# Patient Record
Sex: FEMALE | Race: WHITE | NOT HISPANIC OR LATINO | Employment: OTHER | URBAN - METROPOLITAN AREA
[De-identification: names, ages, dates, MRNs, and addresses within clinical notes are randomized per-mention and may not be internally consistent; named-entity substitution may affect disease eponyms.]

---

## 2017-05-31 ENCOUNTER — ALLSCRIPTS OFFICE VISIT (OUTPATIENT)
Dept: OTHER | Facility: OTHER | Age: 62
End: 2017-05-31

## 2017-10-31 ENCOUNTER — HOSPITAL ENCOUNTER (OUTPATIENT)
Dept: MAMMOGRAPHY | Facility: MEDICAL CENTER | Age: 62
Discharge: HOME | End: 2017-10-31
Payer: COMMERCIAL

## 2017-10-31 DIAGNOSIS — Z13.820 ENCOUNTER FOR SCREENING FOR OSTEOPOROSIS: ICD-10-CM

## 2017-10-31 DIAGNOSIS — N60.99 BENIGN MAMMARY DYSPLASIA OF BREAST: ICD-10-CM

## 2017-10-31 DIAGNOSIS — Z12.31 VISIT FOR SCREENING MAMMOGRAM: ICD-10-CM

## 2017-10-31 PROCEDURE — 77063 BREAST TOMOSYNTHESIS BI: CPT

## 2017-10-31 PROCEDURE — G0202 SCR MAMMO BI INCL CAD: HCPCS

## 2017-11-06 ENCOUNTER — GENERIC CONVERSION - ENCOUNTER (OUTPATIENT)
Dept: OTHER | Facility: OTHER | Age: 62
End: 2017-11-06

## 2017-11-21 ENCOUNTER — ALLSCRIPTS OFFICE VISIT (OUTPATIENT)
Dept: OTHER | Facility: OTHER | Age: 62
End: 2017-11-21

## 2017-11-22 NOTE — PROGRESS NOTES
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)    Plan  Screening for osteoporosis    · * DXA BONE DENSITY SPINE HIP AND PELVIS; Status:Hold For -Scheduling,Retrospective Authorization; Requested FMW:[id number];    Perform:[facility] Radiology; Order Comments:PATIENT HAD HEEL SCAN IN OFFICE: LF -2 0, RF -2 4; Due:21Nov2018; Last Updated By:Sondra Diaz; 11/21/2017 11:21:40 AM;Ordered;for osteoporosis; Ordered By:Robert Franklin;    Chief Complaint  Patient presents today for a yearly exam      History of Present Illness  HPI: prior Veverly Belts followed by Dr Madelyn Henry, Adult Female [facility]: The patient is being seen for a gynecology evaluation  The last health maintenance visit was 1 year(s) ago  General Health:  Reproductive health: the patient is postmenopausal    Screening:      Review of Systems   Constitutional: No fever, no chills, feels well, no tiredness, no recent weight gain or loss  Breasts: no complaints of breast pain, breast lump or nipple discharge  Gastrointestinal: no complaints of abdominal pain, no constipation, no nausea or diarrhea, no vomiting, no bloody stools  Genitourinary: no complaints of dysuria, no incontinence, no pelvic pain, no dysmenorrhea, no vaginal discharge or abnormal vaginal bleeding  Active Problems  1  Atypical ductal hyperplasia 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) (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 99)   2  Breast mass (611 72) (N63 0)   3  Breast pain (611 71) (N64 4)   4  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)   5  Menopausal symptoms (627 2) (N95 1)   6  Osteoporosis, senile (733 01) (M81 0)   7  Pruritus vulvae (698 1) (L29 2)   8   Visit for screening mammogram (V76 12) (Z12 31)    Past Medical History     · History of Breast Mass Solitary Nontender 1 cm   · History of Encounter for screening for malignant neoplasm of cervix (V76 2) (Z12 4)   · History of Encounter for screening mammogram for malignant neoplasm of breast(V76 12) (Z12 31)   · History of Left breast lump (611 72) (N63 20)   · History of Summary Of Previous Pregnancies  2  (Total No )   · History of Summary Of Previous Pregnancies Para 2  (Deliveries)   · History of Tetanus (037)    Surgical History   · History of Colonoscopy (Fiberoptic) Screening   · History of Total Abdominal Hysterectomy    Family History  Mother    · Family history of Fibromyalgia   · Family history of Pancreatic Cancer Susceptibility (V84 09)  Father    · Family history of Heart disease  Son    · Family history of bipolar disorder (V17 0) (Z81 8)   · Family history of Suicide  Maternal Aunt    · Family history of Breast Cancer (V16 3)  Family History    · Family history of Family Health Status Of Mother -     Social History     · Denied: Alcohol Use (History)   · Death in the family, child   · suicide 13  · Former smoker (Z12 73) (K08 568)   · Marital History - Single   · Denied: Physical Abuse (History) (V15 41)    Current Meds   1  Accu-Chek Opal STRP; Therapy: (Recorded:2015) to Recorded   2  Ammonium Lactate 12 % External Lotion; Therapy: [id number] to ([place] P[place]) Recorded   3  Calcium TABS; Therapy: (Recorded:2015) to Recorded   4  Halobetasol Propionate 0 05 % External Ointment; Therapy: 2015 to (Evaluate:2015) Recorded   5  Lexapro 20 MG Oral Tablet; TAKE 1 TABLET DAILY; Therapy: (Recorded:2015) to Recorded   6  Multi-Day Vitamins TABS; TAKE 1 TABLET DAILY; Therapy: (Recorded:2015) to Recorded   7  Oxybutynin Chloride 5 MG Oral Tablet; Take 1 tablet daily; Therapy: 30SPS8386 to (Evaluate:2017)  Requested for: 83HSW3978; Last Rx:2016 Ordered   8  PredniSONE 10 MG Oral Tablet; Therapy: 2015 to (Evaluate:2015) Recorded   9  Protonix 20 MG Oral Tablet Delayed Release; TAKE 1 TABLET DAILY; Therapy: (Recorded:2015) to Recorded   10  Vitamin D 1000 UNIT CAPS; TAKE AS DIRECTED;   Therapy: (Recorded:99Asr2919) to Recorded   11  Xanax TABS; Therapy: (Recorded:10Adm0878) to Recorded    Allergies  1  Penicillins   2  Ibuprofen CAPS  3  Adhesive Tape    Vitals   Recorded: [id number] 10:58AM   Heart Rate 66   Systolic 439   Diastolic 60   Height 5 ft 5 75 in   Weight 180 lb 8 oz   BMI Calculated 29 36   BSA Calculated 1 91   LMP ANEESH       Physical Exam   Constitutional  General appearance: No acute distress, well appearing and well nourished  Neck  Neck: Normal, supple, trachea midline, no masses  Thyroid: Normal, no thyromegaly  Pulmonary  Respiratory effort: No increased work of breathing or signs of respiratory distress  Auscultation of lungs: Clear to auscultation  Cardiovascular  Auscultation of heart: Normal rate and rhythm, normal S1 and S2, no murmurs  Peripheral vascular exam: Normal pulses Throughout  Genitourinary  External genitalia: Normal and no lesions appreciated  Vagina: Abnormal   Vagina: atrophy, but-- no bleeding  Urethra: Normal    Urethral meatus: Normal    Bladder: Normal, soft, non-tender and no prolapse or masses appreciated  Cervix: Surgically absent  Uterus: Surgically absent  Adnexa/parametria: Normal, non-tender and no fullness or masses appreciated  Chest  Breasts: Normal and no dimpling or skin changes noted  Abdomen  Abdomen: Normal, non-tender, and no organomegaly noted  Liver and spleen: No hepatomegaly or splenomegaly  Examination for hernias: No hernias appreciated  Lymphatic  Palpation of lymph nodes in neck, axillae, groin and/or other locations: No lymphadenopathy or masses noted     Psychiatric  Orientation to person, place, and time: Normal    Mood and affect: Normal        Signatures   Electronically signed by : Michael Meyer DO; Nov 21 2017 11:25AM EST                       (Author)

## 2017-11-29 ENCOUNTER — GENERIC CONVERSION - ENCOUNTER (OUTPATIENT)
Dept: OTHER | Facility: OTHER | Age: 62
End: 2017-11-29

## 2017-11-29 ENCOUNTER — HOSPITAL ENCOUNTER (OUTPATIENT)
Dept: BONE DENSITY | Facility: MEDICAL CENTER | Age: 62
Discharge: HOME | End: 2017-11-29
Payer: COMMERCIAL

## 2017-11-29 DIAGNOSIS — Z13.820 ENCOUNTER FOR SCREENING FOR OSTEOPOROSIS: ICD-10-CM

## 2017-11-29 PROCEDURE — 77080 DXA BONE DENSITY AXIAL: CPT

## 2018-01-13 VITALS
WEIGHT: 180.5 LBS | HEART RATE: 66 BPM | DIASTOLIC BLOOD PRESSURE: 60 MMHG | HEIGHT: 66 IN | BODY MASS INDEX: 29.01 KG/M2 | SYSTOLIC BLOOD PRESSURE: 120 MMHG

## 2018-01-14 VITALS
RESPIRATION RATE: 16 BRPM | HEART RATE: 70 BPM | TEMPERATURE: 99.2 F | HEIGHT: 67 IN | SYSTOLIC BLOOD PRESSURE: 138 MMHG | DIASTOLIC BLOOD PRESSURE: 89 MMHG

## 2018-01-16 NOTE — MISCELLANEOUS
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)   5  Menopausal symptoms (627 2) (N95 1)   6  Osteoporosis, senile (733 01) (M81 0)   7  Pruritus vulvae (698 1) (L29 2)    Current Meds   1  Accu-Chek Opal STRP; Therapy: (Recorded:16Apr2015) to Recorded   2  Ammonium Lactate 12 % External Lotion; Therapy: [id number] to (Jc Schaefer) Recorded   3  Calcium TABS; Therapy: (Recorded:16Apr2015) to Recorded   4  Halobetasol Propionate 0 05 % External Ointment; Therapy: 09Apr2015 to (Evaluate:22May2015) Recorded   5  Lexapro 20 MG Oral Tablet (Escitalopram Oxalate); TAKE 1 TABLET DAILY; Therapy: (Recorded:16Apr2015) to Recorded   6  Multi-Day Vitamins TABS; TAKE 1 TABLET DAILY; Therapy: (Recorded:16Apr2015) to Recorded   7  Oxybutynin Chloride 5 MG Oral Tablet; Take 1 tablet daily; Therapy: 28NDT5823 to (Evaluate:12Jun2017)  Requested for: 15LFA6620; Last   Rx:14Nov2016 Ordered   8  PredniSONE 10 MG Oral Tablet; Therapy: 13Apr2015 to (Evaluate:07May2015) Recorded   9  Protonix 20 MG Oral Tablet Delayed Release (Pantoprazole Sodium); TAKE 1 TABLET   DAILY; Therapy: (Recorded:16Apr2015) to Recorded   10  Vitamin D 1000 UNIT CAPS; TAKE AS DIRECTED; Therapy: (Recorded:16Apr2015) to Recorded   11  Xanax TABS (ALPRAZolam); Therapy: (Recorded:16Apr2015) to Recorded    Allergies    1  Penicillins   2  Ibuprofen CAPS    3   Adhesive Tape    Signatures   Electronically signed by : Jostin Zamora, ; Nov 15 2016  9:12AM EST                       (Author)

## 2018-01-22 VITALS
WEIGHT: 178.25 LBS | TEMPERATURE: 98.8 F | SYSTOLIC BLOOD PRESSURE: 132 MMHG | HEART RATE: 68 BPM | BODY MASS INDEX: 27.98 KG/M2 | DIASTOLIC BLOOD PRESSURE: 80 MMHG | RESPIRATION RATE: 16 BRPM | HEIGHT: 67 IN

## 2018-02-20 DIAGNOSIS — N95.2 ATROPHIC VAGINITIS: Primary | ICD-10-CM

## 2018-02-21 DIAGNOSIS — N95.2 ATROPHIC VAGINITIS: Primary | ICD-10-CM

## 2018-02-21 RX ORDER — ESTRADIOL 0.1 MG/G
CREAM VAGINAL
Qty: 42.5 G | Refills: 3 | Status: SHIPPED | OUTPATIENT
Start: 2018-02-21 | End: 2018-11-27 | Stop reason: HOSPADM

## 2018-02-21 RX ORDER — ESTRADIOL 0.1 MG/G
1 CREAM VAGINAL WEEKLY
Qty: 42.5 G | Refills: 1 | Status: SHIPPED | OUTPATIENT
Start: 2018-02-21 | End: 2018-03-11 | Stop reason: SDUPTHER

## 2018-03-11 DIAGNOSIS — N95.2 ATROPHIC VAGINITIS: ICD-10-CM

## 2018-03-12 RX ORDER — ESTRADIOL 0.1 MG/G
CREAM VAGINAL
Qty: 42.5 G | Refills: 3 | Status: SHIPPED | OUTPATIENT
Start: 2018-03-12 | End: 2018-08-15 | Stop reason: SDUPTHER

## 2018-07-11 ENCOUNTER — DOCTOR'S OFFICE (OUTPATIENT)
Dept: URBAN - METROPOLITAN AREA CLINIC 136 | Facility: CLINIC | Age: 63
Setting detail: OPHTHALMOLOGY
End: 2018-07-11
Payer: COMMERCIAL

## 2018-07-11 DIAGNOSIS — H25.013: ICD-10-CM

## 2018-07-11 DIAGNOSIS — H40.033: ICD-10-CM

## 2018-07-11 DIAGNOSIS — H43.821: ICD-10-CM

## 2018-07-11 PROCEDURE — 99204 OFFICE O/P NEW MOD 45 MIN: CPT | Performed by: OPHTHALMOLOGY

## 2018-07-11 PROCEDURE — 92134 CPTRZ OPH DX IMG PST SGM RTA: CPT | Performed by: OPHTHALMOLOGY

## 2018-07-11 ASSESSMENT — REFRACTION_MANIFEST
OD_VA1: 20/
OU_VA: 20/
OD_VA2: 20/
OS_VA2: 20/
OS_VA1: 20/
OD_VA3: 20/
OU_VA: 20/
OU_VA: 20/
OD_VA2: 20/
OS_VA3: 20/
OS_VA1: 20/
OD_VA1: 20/
OD_VA2: 20/
OD_VA3: 20/
OS_VA2: 20/
OS_VA1: 20/
OS_VA2: 20/
OD_VA1: 20/
OS_VA3: 20/
OD_VA3: 20/
OS_VA3: 20/

## 2018-07-11 ASSESSMENT — VISUAL ACUITY
OS_BCVA: 20/25+2
OD_BCVA: 20/20-2

## 2018-07-11 ASSESSMENT — REFRACTION_CURRENTRX
OS_OVR_VA: 20/
OS_OVR_VA: 20/
OD_OVR_VA: 20/
OD_OVR_VA: 20/
OS_OVR_VA: 20/
OD_OVR_VA: 20/

## 2018-07-11 ASSESSMENT — CONFRONTATIONAL VISUAL FIELD TEST (CVF)
OD_FINDINGS: FULL
OS_FINDINGS: FULL

## 2018-08-06 ENCOUNTER — DOCTOR'S OFFICE (OUTPATIENT)
Dept: URBAN - METROPOLITAN AREA CLINIC 136 | Facility: CLINIC | Age: 63
Setting detail: OPHTHALMOLOGY
End: 2018-08-06
Payer: COMMERCIAL

## 2018-08-06 DIAGNOSIS — H25.013: ICD-10-CM

## 2018-08-06 DIAGNOSIS — H43.391: ICD-10-CM

## 2018-08-06 DIAGNOSIS — H40.033: ICD-10-CM

## 2018-08-06 DIAGNOSIS — H43.812: ICD-10-CM

## 2018-08-06 DIAGNOSIS — H43.821: ICD-10-CM

## 2018-08-06 DIAGNOSIS — H40.013: ICD-10-CM

## 2018-08-06 PROCEDURE — 92014 COMPRE OPH EXAM EST PT 1/>: CPT | Performed by: OPHTHALMOLOGY

## 2018-08-06 PROCEDURE — 92134 CPTRZ OPH DX IMG PST SGM RTA: CPT | Performed by: OPHTHALMOLOGY

## 2018-08-06 ASSESSMENT — REFRACTION_CURRENTRX
OD_OVR_VA: 20/
OS_OVR_VA: 20/
OD_OVR_VA: 20/
OS_OVR_VA: 20/
OD_OVR_VA: 20/
OS_OVR_VA: 20/

## 2018-08-06 ASSESSMENT — REFRACTION_MANIFEST
OD_VA3: 20/
OS_VA1: 20/
OS_VA3: 20/
OS_VA1: 20/
OS_VA1: 20/
OD_VA3: 20/
OD_VA2: 20/
OU_VA: 20/
OD_VA1: 20/
OD_VA1: 20/
OU_VA: 20/
OD_VA1: 20/
OD_VA3: 20/
OS_VA2: 20/
OS_VA2: 20/
OU_VA: 20/
OD_VA2: 20/
OS_VA2: 20/
OS_VA3: 20/
OD_VA2: 20/
OS_VA3: 20/

## 2018-08-06 ASSESSMENT — VISUAL ACUITY
OS_BCVA: 20/30-2
OD_BCVA: 20/25-

## 2018-08-06 ASSESSMENT — CONFRONTATIONAL VISUAL FIELD TEST (CVF)
OD_FINDINGS: FULL
OS_FINDINGS: FULL

## 2018-08-09 DIAGNOSIS — Z12.39 ENCOUNTER FOR SCREENING FOR MALIGNANT NEOPLASM OF BREAST: Primary | ICD-10-CM

## 2018-08-15 DIAGNOSIS — N95.2 ATROPHIC VAGINITIS: ICD-10-CM

## 2018-08-16 RX ORDER — ESTRADIOL 0.1 MG/G
CREAM VAGINAL
Qty: 42.5 G | Refills: 3 | Status: SHIPPED | OUTPATIENT
Start: 2018-08-16 | End: 2019-12-19

## 2018-09-11 ENCOUNTER — OFFICE VISIT (OUTPATIENT)
Dept: GYNECOLOGY | Facility: CLINIC | Age: 63
End: 2018-09-11
Payer: COMMERCIAL

## 2018-09-11 VITALS
DIASTOLIC BLOOD PRESSURE: 60 MMHG | SYSTOLIC BLOOD PRESSURE: 100 MMHG | BODY MASS INDEX: 26.39 KG/M2 | WEIGHT: 164.2 LBS | HEIGHT: 66 IN

## 2018-09-11 DIAGNOSIS — N63.0 BREAST LUMP: Primary | ICD-10-CM

## 2018-09-11 DIAGNOSIS — N64.4 BILATERAL MASTODYNIA: ICD-10-CM

## 2018-09-11 PROCEDURE — 99213 OFFICE O/P EST LOW 20 MIN: CPT | Performed by: OBSTETRICS & GYNECOLOGY

## 2018-09-11 NOTE — PROGRESS NOTES
Patient presents to the office complaining of a 2 week history of bilateral breast pain  She left side more tender than right  The discomfort is intermittent  She denies any trauma or a drainage from either breast   Denies any fever  She does have a history of atypical ductal hyperplasia  Physical examination bilateral granular changes in each breast with no adenopathy no skin changes  There is distinct tenderness on both breasts the left side is greater than right  Also noted a 1 5 x 1 5 cm smooth freely movable lump in the upper outer quadrant of the left breast and just adjacent to that a 1 x 1 cm smooth freely movable tender lump      Impression bilateral mastodynia/left breast lump    Plan bilateral diagnostic mammogram with left breast ultrasound

## 2018-09-21 ENCOUNTER — HOSPITAL ENCOUNTER (OUTPATIENT)
Dept: ULTRASOUND IMAGING | Facility: CLINIC | Age: 63
Discharge: HOME | End: 2018-09-21
Payer: COMMERCIAL

## 2018-09-21 ENCOUNTER — HOSPITAL ENCOUNTER (OUTPATIENT)
Dept: MAMMOGRAPHY | Facility: CLINIC | Age: 63
Discharge: HOME | End: 2018-09-21
Payer: COMMERCIAL

## 2018-09-21 DIAGNOSIS — N63.0 BREAST LUMP: ICD-10-CM

## 2018-09-21 PROCEDURE — 77066 DX MAMMO INCL CAD BI: CPT

## 2018-09-21 PROCEDURE — 76642 ULTRASOUND BREAST LIMITED: CPT

## 2018-09-21 PROCEDURE — G0279 TOMOSYNTHESIS, MAMMO: HCPCS

## 2018-11-06 DIAGNOSIS — Z12.31 ENCOUNTER FOR SCREENING MAMMOGRAM FOR MALIGNANT NEOPLASM OF BREAST: ICD-10-CM

## 2018-11-27 ENCOUNTER — ANNUAL EXAM (OUTPATIENT)
Dept: GYNECOLOGY | Facility: CLINIC | Age: 63
End: 2018-11-27
Payer: COMMERCIAL

## 2018-11-27 DIAGNOSIS — Z01.419 ENCOUNTER FOR GYNECOLOGICAL EXAMINATION WITHOUT ABNORMAL FINDING: Primary | ICD-10-CM

## 2018-11-27 PROCEDURE — S0612 ANNUAL GYNECOLOGICAL EXAMINA: HCPCS | Performed by: OBSTETRICS & GYNECOLOGY

## 2018-11-27 RX ORDER — AMMONIUM LACTATE 12 G/100G
LOTION TOPICAL
COMMUNITY
Start: 2014-11-25

## 2018-11-27 RX ORDER — PYRIDOXINE HCL (VITAMIN B6) 100 MG
TABLET ORAL
COMMUNITY

## 2018-11-27 RX ORDER — ALPRAZOLAM 0.5 MG/1
0.5 TABLET ORAL 2 TIMES DAILY PRN
COMMUNITY

## 2018-11-27 RX ORDER — SUCRALFATE 1 G/10ML
SUSPENSION ORAL
Refills: 3 | COMMUNITY
Start: 2018-11-13

## 2018-11-27 RX ORDER — LISINOPRIL 5 MG/1
10 TABLET ORAL DAILY
Refills: 1 | COMMUNITY
Start: 2018-09-16

## 2018-11-27 RX ORDER — LORAZEPAM 1 MG/1
TABLET ORAL
COMMUNITY

## 2018-11-27 RX ORDER — TRAMADOL HYDROCHLORIDE 50 MG/1
50 TABLET ORAL EVERY 6 HOURS PRN
COMMUNITY

## 2018-11-27 RX ORDER — BIOTIN 1 MG
TABLET ORAL
COMMUNITY

## 2018-11-27 RX ORDER — PANTOPRAZOLE SODIUM 20 MG/1
1 TABLET, DELAYED RELEASE ORAL DAILY
COMMUNITY

## 2018-11-27 RX ORDER — ESCITALOPRAM OXALATE 10 MG/1
5 TABLET ORAL DAILY
COMMUNITY

## 2018-11-27 RX ORDER — BENZONATATE 100 MG/1
CAPSULE ORAL
Refills: 3 | COMMUNITY
Start: 2018-09-21

## 2018-11-27 RX ORDER — LANCETS 33 GAUGE
EACH MISCELLANEOUS
Refills: 3 | COMMUNITY
Start: 2018-11-13

## 2018-11-27 RX ORDER — FLUTICASONE PROPIONATE 50 MCG
SPRAY, SUSPENSION (ML) NASAL
COMMUNITY
Start: 2018-08-28

## 2018-11-27 RX ORDER — LIDOCAINE 50 MG/G
PATCH TOPICAL
Refills: 3 | COMMUNITY
Start: 2018-11-13

## 2018-11-27 RX ORDER — OMEGA-3/DHA/EPA/FISH OIL 35-113.5MG
1000 TABLET,CHEWABLE ORAL DAILY
Refills: 3 | COMMUNITY
Start: 2018-09-18

## 2018-11-27 RX ORDER — ATORVASTATIN CALCIUM 10 MG/1
TABLET, FILM COATED ORAL
COMMUNITY
Start: 2018-08-16

## 2018-11-27 NOTE — PROGRESS NOTES
Assessment/Plan:         Diagnoses and all orders for this visit:    Encounter for gynecological examination without abnormal finding    Osrteopenia    Other orders  -     PROAIR  (90 Base) MCG/ACT inhaler; 1 puff every 6 (six) hours as needed  -     glucose blood (ACCU-CHEK SEGUN PLUS) test strip; by In Vitro route  -     ALPRAZolam (XANAX) 0 5 mg tablet; Take 0 5 mg by mouth 2 (two) times a day as needed  -     ammonium lactate (LAC-HYDRIN) 12 % lotion; Apply topically  -     LORazepam (ATIVAN) 1 mg tablet; Take 1 or 2 tablet(s) twice a day needed for anxiety  -     atorvastatin (LIPITOR) 10 mg tablet; TAKE ONE TABLET BY MOUTH NIGHTLY  -     benzonatate (TESSALON PERLES) 100 mg capsule; TAKE 2 CAPSULES BY MOUTH EVERY 8 HOURS AS NEEDED FOR COUGH  -     Calcium 500-125 MG-UNIT TABS; three times daily  -     [facility] VITAMIN B-12 1000 MCG tablet; Take 1,000 mcg by mouth daily  -     fluticasone (FLONASE) 50 mcg/act nasal spray; 2 SPRAYS INTO EACH NOSTRIL DAILY  -     ONE TOUCH ULTRA TEST test strip; TEST 3 X DAILY  -     ONETOUCH DELICA LANCETS 96P MISC; TEST 3 X DAILY  -     lidocaine (LIDODERM) 5 %; PLACE 1 PATCH ON THE SKIN DAILY  REMOVE & DISCARD PATCH WITHIN 12 HOURS OR AS DIRECTED BY MD  -     lisinopril (ZESTRIL) 5 mg tablet; Take 10 mg by mouth daily  -     pantoprazole (PROTONIX) 20 mg tablet; Take 1 tablet by mouth daily  -     CARAFATE 1 GM/10ML suspension; 10 MILLILITER BEFORE MEALS AND AT BEDTIME  -     traMADol (ULTRAM) 50 mg tablet; Take 50 mg by mouth every 6 (six) hours as needed  -     Cholecalciferol (VITAMIN D3) 1000 units CAPS; Take by mouth  -     escitalopram (LEXAPRO) 10 mg tablet; Take 5 mg by mouth daily        Subjective:      Patient ID: Harpal Shannon is a 61 y o  female  HPI   Annual exam  No c/o  Prior ANEESH  Hx ADH   Followed by Dr Tamie Echeverria 2018 nml  DEXA 11/29/17    The following portions of the patient's history were reviewed and updated as appropriate: allergies, current medications, past family history, past medical history, past social history, past surgical history and problem list     Review of Systems   Constitutional: Negative  Gastrointestinal: Negative  Genitourinary: Negative  Objective: There were no vitals taken for this visit  Physical Exam   Constitutional: She appears well-nourished  Neck: Normal range of motion  Neck supple  No thyromegaly present  Cardiovascular: Normal rate, regular rhythm and normal heart sounds  Pulmonary/Chest: Effort normal and breath sounds normal  No respiratory distress  Right breast exhibits no inverted nipple, no mass, no nipple discharge, no skin change and no tenderness  Left breast exhibits no inverted nipple, no mass, no nipple discharge, no skin change and no tenderness  Breasts are symmetrical    Abdominal: Soft  Bowel sounds are normal  She exhibits no distension and no mass  There is no tenderness  There is no rebound and no guarding  Hernia confirmed negative in the right inguinal area and confirmed negative in the left inguinal area  Genitourinary: There is no rash or lesion on the right labia  There is no rash or lesion on the left labia  Right adnexum displays no mass, no tenderness and no fullness  Left adnexum displays no mass, no tenderness and no fullness  No erythema or bleeding in the vagina  No vaginal discharge found  Lymphadenopathy:        Right: No inguinal adenopathy present  Left: No inguinal adenopathy present

## 2019-06-18 ENCOUNTER — OFFICE VISIT (OUTPATIENT)
Dept: GYNECOLOGY | Facility: CLINIC | Age: 64
End: 2019-06-18
Payer: COMMERCIAL

## 2019-06-18 VITALS — HEART RATE: 97 BPM | DIASTOLIC BLOOD PRESSURE: 90 MMHG | SYSTOLIC BLOOD PRESSURE: 132 MMHG

## 2019-06-18 DIAGNOSIS — R10.2 PELVIC PAIN: ICD-10-CM

## 2019-06-18 DIAGNOSIS — R10.2 VAGINAL PAIN: Primary | ICD-10-CM

## 2019-06-18 PROCEDURE — 99214 OFFICE O/P EST MOD 30 MIN: CPT | Performed by: OBSTETRICS & GYNECOLOGY

## 2019-07-01 ENCOUNTER — RX ONLY (RX ONLY)
Age: 64
End: 2019-07-01

## 2019-07-01 ENCOUNTER — DOCTOR'S OFFICE (OUTPATIENT)
Dept: URBAN - METROPOLITAN AREA CLINIC 136 | Facility: CLINIC | Age: 64
Setting detail: OPHTHALMOLOGY
End: 2019-07-01
Payer: COMMERCIAL

## 2019-07-01 DIAGNOSIS — H43.812: ICD-10-CM

## 2019-07-01 DIAGNOSIS — H43.391: ICD-10-CM

## 2019-07-01 DIAGNOSIS — H40.033: ICD-10-CM

## 2019-07-01 DIAGNOSIS — H43.821: ICD-10-CM

## 2019-07-01 DIAGNOSIS — H40.013: ICD-10-CM

## 2019-07-01 DIAGNOSIS — H25.013: ICD-10-CM

## 2019-07-01 PROCEDURE — 76514 ECHO EXAM OF EYE THICKNESS: CPT | Performed by: OPHTHALMOLOGY

## 2019-07-01 PROCEDURE — 92014 COMPRE OPH EXAM EST PT 1/>: CPT | Performed by: OPHTHALMOLOGY

## 2019-07-01 PROCEDURE — 92250 FUNDUS PHOTOGRAPHY W/I&R: CPT | Performed by: OPHTHALMOLOGY

## 2019-07-01 ASSESSMENT — REFRACTION_CURRENTRX
OD_CYLINDER: -0.25
OS_OVR_VA: 20/
OD_OVR_VA: 20/
OD_ADD: +0.00
OD_SPHERE: +2.00
OS_OVR_VA: 20/
OD_OVR_VA: 20/
OD_VPRISM_DIRECTION: SV
OS_AXIS: 078
OS_ADD: +2.25
OS_SPHERE: +3.00
OS_CYLINDER: -0.75
OS_VPRISM_DIRECTION: SV
OD_OVR_VA: 20/
OD_AXIS: 157
OS_OVR_VA: 20/

## 2019-07-01 ASSESSMENT — REFRACTION_MANIFEST
OS_VA1: 20/
OD_VA3: 20/
OS_VA2: 20/
OD_VA1: 20/
OS_VA1: 20/
OS_VA2: 20/
OD_VA3: 20/
OD_VA2: 20/
OD_VA1: 20/
OS_VA3: 20/
OS_VA3: 20/
OD_VA2: 20/
OU_VA: 20/
OU_VA: 20/

## 2019-07-01 ASSESSMENT — PACHYMETRY
OS_CT_UM: 551
OD_CT_UM: 543
OS_CT_CORRECTION: -1
OD_CT_CORRECTION: 0

## 2019-07-01 ASSESSMENT — VISUAL ACUITY
OD_BCVA: 20/25+2
OS_BCVA: 20/25+2

## 2019-07-01 ASSESSMENT — CONFRONTATIONAL VISUAL FIELD TEST (CVF)
OS_FINDINGS: FULL
OD_FINDINGS: FULL

## 2019-07-01 ASSESSMENT — SPHEQUIV_DERIVED
OD_SPHEQUIV: 2.5
OS_SPHEQUIV: 2.125

## 2019-07-01 ASSESSMENT — REFRACTION_AUTOREFRACTION
OS_AXIS: 162
OS_SPHERE: +2.25
OD_CYLINDER: -0.50
OD_SPHERE: +2.75
OD_AXIS: 080
OS_CYLINDER: -0.25

## 2019-08-07 ENCOUNTER — DOCTOR'S OFFICE (OUTPATIENT)
Dept: URBAN - METROPOLITAN AREA CLINIC 136 | Facility: CLINIC | Age: 64
Setting detail: OPHTHALMOLOGY
End: 2019-08-07
Payer: COMMERCIAL

## 2019-08-07 DIAGNOSIS — H43.812: ICD-10-CM

## 2019-08-07 DIAGNOSIS — H35.361: ICD-10-CM

## 2019-08-07 DIAGNOSIS — H43.821: ICD-10-CM

## 2019-08-07 DIAGNOSIS — H43.391: ICD-10-CM

## 2019-08-07 PROCEDURE — 92134 CPTRZ OPH DX IMG PST SGM RTA: CPT | Performed by: OPHTHALMOLOGY

## 2019-08-07 PROCEDURE — 92014 COMPRE OPH EXAM EST PT 1/>: CPT | Performed by: OPHTHALMOLOGY

## 2019-08-07 ASSESSMENT — REFRACTION_MANIFEST
OS_VA3: 20/
OD_VA1: 20/
OU_VA: 20/
OD_VA1: 20/
OS_VA1: 20/
OS_VA3: 20/
OS_VA2: 20/
OD_VA2: 20/
OU_VA: 20/
OD_VA2: 20/
OD_VA3: 20/
OD_VA3: 20/
OS_VA2: 20/
OS_VA1: 20/

## 2019-08-07 ASSESSMENT — REFRACTION_CURRENTRX
OD_AXIS: 157
OS_VPRISM_DIRECTION: SV
OS_OVR_VA: 20/
OS_OVR_VA: 20/
OD_OVR_VA: 20/
OD_SPHERE: +2.00
OS_AXIS: 078
OD_VPRISM_DIRECTION: SV
OD_OVR_VA: 20/
OD_CYLINDER: -0.25
OD_OVR_VA: 20/
OS_OVR_VA: 20/
OS_CYLINDER: -0.75
OD_ADD: +0.00
OS_ADD: +2.25
OS_SPHERE: +3.00

## 2019-08-07 ASSESSMENT — REFRACTION_AUTOREFRACTION
OS_SPHERE: +2.25
OD_SPHERE: +2.75
OD_AXIS: 080
OD_CYLINDER: -0.50
OS_CYLINDER: -0.25
OS_AXIS: 162

## 2019-08-07 ASSESSMENT — CONFRONTATIONAL VISUAL FIELD TEST (CVF)
OS_FINDINGS: FULL
OD_FINDINGS: FULL

## 2019-08-07 ASSESSMENT — SPHEQUIV_DERIVED
OD_SPHEQUIV: 2.5
OS_SPHEQUIV: 2.125

## 2019-08-07 ASSESSMENT — VISUAL ACUITY
OD_BCVA: 20/20-2
OS_BCVA: 20/25-2

## 2019-09-09 DIAGNOSIS — Z12.31 ENCOUNTER FOR SCREENING MAMMOGRAM FOR MALIGNANT NEOPLASM OF BREAST: Primary | ICD-10-CM

## 2019-10-24 ENCOUNTER — HOSPITAL ENCOUNTER (OUTPATIENT)
Dept: MAMMOGRAPHY | Facility: MEDICAL CENTER | Age: 64
Discharge: HOME | End: 2019-10-24
Payer: COMMERCIAL

## 2019-10-24 VITALS — WEIGHT: 164 LBS | BODY MASS INDEX: 26.36 KG/M2 | HEIGHT: 66 IN

## 2019-10-24 DIAGNOSIS — Z12.31 ENCOUNTER FOR SCREENING MAMMOGRAM FOR MALIGNANT NEOPLASM OF BREAST: ICD-10-CM

## 2019-10-24 PROCEDURE — 77063 BREAST TOMOSYNTHESIS BI: CPT

## 2019-10-24 PROCEDURE — 77067 SCR MAMMO BI INCL CAD: CPT

## 2019-12-19 ENCOUNTER — ANNUAL EXAM (OUTPATIENT)
Dept: GYNECOLOGY | Facility: CLINIC | Age: 64
End: 2019-12-19
Payer: COMMERCIAL

## 2019-12-19 VITALS
DIASTOLIC BLOOD PRESSURE: 78 MMHG | WEIGHT: 180 LBS | SYSTOLIC BLOOD PRESSURE: 124 MMHG | HEIGHT: 66 IN | HEART RATE: 109 BPM | BODY MASS INDEX: 28.93 KG/M2

## 2019-12-19 DIAGNOSIS — Z01.419 ENCOUNTER FOR GYNECOLOGICAL EXAMINATION WITHOUT ABNORMAL FINDING: Primary | ICD-10-CM

## 2019-12-19 DIAGNOSIS — N95.2 ATROPHIC VAGINITIS: ICD-10-CM

## 2019-12-19 PROCEDURE — S0612 ANNUAL GYNECOLOGICAL EXAMINA: HCPCS | Performed by: OBSTETRICS & GYNECOLOGY

## 2019-12-19 RX ORDER — ESTRADIOL 0.1 MG/G
CREAM VAGINAL
Qty: 42.5 G | Refills: 3 | Status: SHIPPED | OUTPATIENT
Start: 2019-12-19 | End: 2020-07-17

## 2019-12-19 NOTE — PROGRESS NOTES
Assessment/Plan:    No problem-specific Assessment & Plan notes found for this encounter  Diagnoses and all orders for this visit:    Encounter for gynecological examination without abnormal finding    Atrophic vaginitis  -     estradiol (ESTRACE VAGINAL) 0 1 mg/g vaginal cream; 1 gram M,W,F x 3 weeks, then once weekly      Osteopenia    Subjective:      Patient ID: Hussein Jara is a 59 y o  female  HPI patient presents for annual examination  She offers no complaints  She needs a refill of Estrace vaginal cream for atrophic vaginitis  She has had a prior total abdominal hysterectomy  Colonoscopy 2018 normal    DEXA scan 2019 osteopenia    The following portions of the patient's history were reviewed and updated as appropriate:   She  has no past medical history on file  She There are no active problems to display for this patient  She  has a past surgical history that includes Colonoscopy; Total abdominal hysterectomy (age 52); Breast excisional biopsy (Right, 2013); Breast excisional biopsy (Left, 2009); Breast biopsy (Left, 2010); and Breast biopsy (Right, 2006)  Her family history includes Bipolar disorder in her son; Breast cancer in her maternal aunt; Completed Suicide  in her son; Fibromyalgia in her mother; Heart disease in her father; No Known Problems in her maternal aunt, maternal aunt, maternal aunt, maternal grandfather, maternal grandmother, paternal grandfather, paternal grandmother, and sister; Pancreatic cancer in her mother  She  reports that she has quit smoking  She has never used smokeless tobacco  Her alcohol and drug histories are not on file  Current Outpatient Medications   Medication Sig Dispense Refill    ALPRAZolam (XANAX) 0 5 mg tablet Take 0 5 mg by mouth 2 (two) times a day as needed      ammonium lactate (LAC-HYDRIN) 12 % lotion Apply topically      atorvastatin (LIPITOR) 10 mg tablet TAKE ONE TABLET BY MOUTH NIGHTLY        benzonatate (TESSALON PERLES) 100 mg capsule TAKE 2 CAPSULES BY MOUTH EVERY 8 HOURS AS NEEDED FOR COUGH  3    Calcium 500-125 MG-UNIT TABS three times daily      CARAFATE 1 GM/10ML suspension 10 MILLILITER BEFORE MEALS AND AT BEDTIME  3    Cholecalciferol (VITAMIN D3) 1000 units CAPS Take by mouth      [facility]S VITAMIN B-12 1000 MCG tablet Take 1,000 mcg by mouth daily  3    escitalopram (LEXAPRO) 10 mg tablet Take 5 mg by mouth daily      estradiol (ESTRACE VAGINAL) 0 1 mg/g vaginal cream 1 gram M,W,F x 3 weeks, then once weekly 42 5 g 3    fluticasone (FLONASE) 50 mcg/act nasal spray 2 SPRAYS INTO EACH NOSTRIL DAILY   glucose blood (ACCU-CHEK SEGUN PLUS) test strip by In Vitro route      lidocaine (LIDODERM) 5 % PLACE 1 PATCH ON THE SKIN DAILY  REMOVE & DISCARD PATCH WITHIN 12 HOURS OR AS DIRECTED BY MD  3    lisinopril (ZESTRIL) 5 mg tablet Take 10 mg by mouth daily  1    LORazepam (ATIVAN) 1 mg tablet Take 1 or 2 tablet(s) twice a day needed for anxiety   ONE TOUCH ULTRA TEST test strip TEST 3 X DAILY  3    ONETOUCH DELICA LANCETS 46R MISC TEST 3 X DAILY  3    pantoprazole (PROTONIX) 20 mg tablet Take 1 tablet by mouth daily      PROAIR  (90 Base) MCG/ACT inhaler 1 puff every 6 (six) hours as needed  2    traMADol (ULTRAM) 50 mg tablet Take 50 mg by mouth every 6 (six) hours as needed       No current facility-administered medications for this visit  Current Outpatient Medications on File Prior to Visit   Medication Sig    ALPRAZolam (XANAX) 0 5 mg tablet Take 0 5 mg by mouth 2 (two) times a day as needed    ammonium lactate (LAC-HYDRIN) 12 % lotion Apply topically    atorvastatin (LIPITOR) 10 mg tablet TAKE ONE TABLET BY MOUTH NIGHTLY      benzonatate (TESSALON PERLES) 100 mg capsule TAKE 2 CAPSULES BY MOUTH EVERY 8 HOURS AS NEEDED FOR COUGH    Calcium 500-125 MG-UNIT TABS three times daily    CARAFATE 1 GM/10ML suspension 10 MILLILITER BEFORE MEALS AND AT BEDTIME    Cholecalciferol (VITAMIN D3) 1000 units CAPS Take by mouth    [facility]S VITAMIN B-12 1000 MCG tablet Take 1,000 mcg by mouth daily    escitalopram (LEXAPRO) 10 mg tablet Take 5 mg by mouth daily    fluticasone (FLONASE) 50 mcg/act nasal spray 2 SPRAYS INTO EACH NOSTRIL DAILY   glucose blood (ACCU-CHEK SEGUN PLUS) test strip by In Vitro route    lidocaine (LIDODERM) 5 % PLACE 1 PATCH ON THE SKIN DAILY  REMOVE & DISCARD PATCH WITHIN 12 HOURS OR AS DIRECTED BY MD    lisinopril (ZESTRIL) 5 mg tablet Take 10 mg by mouth daily    LORazepam (ATIVAN) 1 mg tablet Take 1 or 2 tablet(s) twice a day needed for anxiety   ONE TOUCH ULTRA TEST test strip TEST 3 X DAILY    ONETOUCH DELICA LANCETS 95I MISC TEST 3 X DAILY    pantoprazole (PROTONIX) 20 mg tablet Take 1 tablet by mouth daily    PROAIR  (90 Base) MCG/ACT inhaler 1 puff every 6 (six) hours as needed    traMADol (ULTRAM) 50 mg tablet Take 50 mg by mouth every 6 (six) hours as needed    [DISCONTINUED] ESTRACE VAGINAL 0 1 MG/GM vaginal cream USE 1GM MONDAY,WEDNESDAY AND FRIDAY FOR 3WEEKS  THEN 0 5-1GM WEEKLY THEREAFTER  No current facility-administered medications on file prior to visit  She is allergic to ibuprofen; penicillins; bupropion; and medical tape       Review of Systems   Constitutional: Negative  HENT: Negative for sore throat and trouble swallowing  Gastrointestinal: Negative  Genitourinary: Negative  Objective:      /78 (BP Location: Left arm, Patient Position: Sitting)   Pulse (!) 109   Ht 5' 5 8" (1 671 m)   Wt 81 6 kg (180 lb)   BMI 29 23 kg/m²          Physical Exam   Neck: Normal range of motion  Neck supple  No thyromegaly present  Cardiovascular: Normal rate, regular rhythm and normal heart sounds  Pulmonary/Chest: Effort normal and breath sounds normal  No respiratory distress  Right breast exhibits no inverted nipple, no mass, no nipple discharge, no skin change and no tenderness   Left breast exhibits no inverted nipple, no mass, no nipple discharge, no skin change and no tenderness  Abdominal: Soft  Bowel sounds are normal  She exhibits no distension and no mass  There is no tenderness  There is no rebound and no guarding  No hernia  Hernia confirmed negative in the right inguinal area and confirmed negative in the left inguinal area  Genitourinary: There is no rash, tenderness or lesion on the right labia  There is no rash, tenderness or lesion on the left labia  Right adnexum displays no mass, no tenderness and no fullness  Left adnexum displays no mass, no tenderness and no fullness  No erythema or bleeding in the vagina  No vaginal discharge found  Genitourinary Comments: Uterus and cervix surgically absent  Vagina atrophic changes  Vulva normal   Urethral orifice normal   Urethra midline  No cystocele or rectocele  Perineum normal   Bartholin's and Ector's glands normal   Lymphadenopathy:     She has no cervical adenopathy  No inguinal adenopathy noted on the right or left side

## 2019-12-26 ENCOUNTER — TELEPHONE (OUTPATIENT)
Dept: GYNECOLOGY | Facility: CLINIC | Age: 64
End: 2019-12-26

## 2019-12-26 NOTE — TELEPHONE ENCOUNTER
ROSEMARY, Patient called stating she does have some urinary incontinence at times   She states she told you she does not at the time of her yearly exam

## 2020-01-24 ENCOUNTER — DOCTOR'S OFFICE (OUTPATIENT)
Dept: URBAN - METROPOLITAN AREA CLINIC 136 | Facility: CLINIC | Age: 65
Setting detail: OPHTHALMOLOGY
End: 2020-01-24
Payer: COMMERCIAL

## 2020-01-24 VITALS — HEIGHT: 55 IN

## 2020-01-24 DIAGNOSIS — H04.123: ICD-10-CM

## 2020-01-24 PROCEDURE — 99212 OFFICE O/P EST SF 10 MIN: CPT | Performed by: OPTOMETRIST

## 2020-01-24 ASSESSMENT — SPHEQUIV_DERIVED
OS_SPHEQUIV: 2.125
OD_SPHEQUIV: 2.125

## 2020-01-24 ASSESSMENT — REFRACTION_AUTOREFRACTION
OS_CYLINDER: +0.75
OS_AXIS: 171
OD_AXIS: 093
OD_CYLINDER: +0.75
OS_SPHERE: +1.75
OD_SPHERE: +1.75

## 2020-01-24 ASSESSMENT — REFRACTION_CURRENTRX
OS_OVR_VA: 20/
OD_CYLINDER: +0.25
OD_SPHERE: +2.00
OS_AXIS: 162
OD_AXIS: 068
OD_ADD: +2.25
OS_VPRISM_DIRECTION: SV
OD_OVR_VA: 20/
OD_VPRISM_DIRECTION: SV
OS_SPHERE: +2.25
OS_ADD: +2.25
OS_CYLINDER: +1.00

## 2020-01-24 ASSESSMENT — VISUAL ACUITY
OD_BCVA: 20/20-2
OS_BCVA: 20/20-1

## 2020-01-24 ASSESSMENT — CONFRONTATIONAL VISUAL FIELD TEST (CVF)
OD_FINDINGS: FULL
OS_FINDINGS: FULL

## 2020-03-02 ENCOUNTER — RX ONLY (RX ONLY)
Age: 65
End: 2020-03-02

## 2020-03-02 ENCOUNTER — DOCTOR'S OFFICE (OUTPATIENT)
Dept: URBAN - METROPOLITAN AREA CLINIC 136 | Facility: CLINIC | Age: 65
Setting detail: OPHTHALMOLOGY
End: 2020-03-02
Payer: COMMERCIAL

## 2020-03-02 DIAGNOSIS — H43.812: ICD-10-CM

## 2020-03-02 DIAGNOSIS — H40.013: ICD-10-CM

## 2020-03-02 DIAGNOSIS — H05.20: ICD-10-CM

## 2020-03-02 DIAGNOSIS — H35.363: ICD-10-CM

## 2020-03-02 DIAGNOSIS — H25.013: ICD-10-CM

## 2020-03-02 DIAGNOSIS — H43.391: ICD-10-CM

## 2020-03-02 DIAGNOSIS — H40.033: ICD-10-CM

## 2020-03-02 DIAGNOSIS — H43.821: ICD-10-CM

## 2020-03-02 DIAGNOSIS — H04.123: ICD-10-CM

## 2020-03-02 PROCEDURE — 92133 CPTRZD OPH DX IMG PST SGM ON: CPT | Performed by: OPHTHALMOLOGY

## 2020-03-02 PROCEDURE — 92014 COMPRE OPH EXAM EST PT 1/>: CPT | Performed by: OPHTHALMOLOGY

## 2020-03-02 ASSESSMENT — CONFRONTATIONAL VISUAL FIELD TEST (CVF)
OS_FINDINGS: FULL
OD_FINDINGS: FULL

## 2020-04-08 ENCOUNTER — TELEPHONE (OUTPATIENT)
Dept: OTHER | Facility: OTHER | Age: 65
End: 2020-04-08

## 2020-04-09 ENCOUNTER — DOCUMENTATION (OUTPATIENT)
Dept: GYNECOLOGY | Facility: CLINIC | Age: 65
End: 2020-04-09

## 2020-04-17 ASSESSMENT — REFRACTION_CURRENTRX
OD_CYLINDER: -1.00
OS_OVR_VA: 20/
OD_VPRISM_DIRECTION: PROGS
OS_AXIS: 015
OS_SPHERE: +2.00
OS_CYLINDER: -0.25
OS_ADD: +2.25
OD_SPHERE: +2.75
OD_ADD: +2.25
OD_AXIS: 075
OS_VPRISM_DIRECTION: PROGS
OD_OVR_VA: 20/

## 2020-04-17 ASSESSMENT — REFRACTION_AUTOREFRACTION
OD_CYLINDER: -1.00
OS_SPHERE: +2.75
OS_AXIS: 093
OD_SPHERE: +3.50
OS_CYLINDER: -0.50
OD_AXIS: 084

## 2020-04-17 ASSESSMENT — SPHEQUIV_DERIVED
OS_SPHEQUIV: 2.5
OD_SPHEQUIV: 3

## 2020-04-17 ASSESSMENT — VISUAL ACUITY
OD_BCVA: 20/20-2
OS_BCVA: 20/25+2

## 2020-07-17 DIAGNOSIS — N95.2 ATROPHIC VAGINITIS: ICD-10-CM

## 2020-07-17 RX ORDER — ESTRADIOL 0.1 MG/G
CREAM VAGINAL
Qty: 42.5 G | Refills: 3 | Status: SHIPPED | OUTPATIENT
Start: 2020-07-17 | End: 2020-09-03 | Stop reason: SDUPTHER

## 2020-08-19 ENCOUNTER — DOCTOR'S OFFICE (OUTPATIENT)
Dept: URBAN - METROPOLITAN AREA CLINIC 136 | Facility: CLINIC | Age: 65
Setting detail: OPHTHALMOLOGY
End: 2020-08-19
Payer: COMMERCIAL

## 2020-08-19 DIAGNOSIS — H43.812: ICD-10-CM

## 2020-08-19 DIAGNOSIS — E11.9: ICD-10-CM

## 2020-08-19 DIAGNOSIS — H40.013: ICD-10-CM

## 2020-08-19 DIAGNOSIS — H43.391: ICD-10-CM

## 2020-08-19 DIAGNOSIS — H04.123: ICD-10-CM

## 2020-08-19 DIAGNOSIS — H35.363: ICD-10-CM

## 2020-08-19 DIAGNOSIS — H25.013: ICD-10-CM

## 2020-08-19 DIAGNOSIS — H40.033: ICD-10-CM

## 2020-08-19 DIAGNOSIS — H43.821: ICD-10-CM

## 2020-08-19 PROCEDURE — 92134 CPTRZ OPH DX IMG PST SGM RTA: CPT | Performed by: OPHTHALMOLOGY

## 2020-08-19 PROCEDURE — 92014 COMPRE OPH EXAM EST PT 1/>: CPT | Performed by: OPHTHALMOLOGY

## 2020-08-19 ASSESSMENT — VISUAL ACUITY
OS_BCVA: 20/20
OD_BCVA: 20/20-2

## 2020-08-19 ASSESSMENT — REFRACTION_AUTOREFRACTION
OD_SPHERE: +3.50
OS_AXIS: 093
OS_SPHERE: +2.75
OS_CYLINDER: -0.50
OD_AXIS: 084
OD_CYLINDER: -1.00

## 2020-08-19 ASSESSMENT — REFRACTION_CURRENTRX
OD_OVR_VA: 20/
OS_OVR_VA: 20/
OS_ADD: +2.25
OS_VPRISM_DIRECTION: PROGS
OD_CYLINDER: -1.00
OS_CYLINDER: -0.25
OD_VPRISM_DIRECTION: PROGS
OD_SPHERE: +2.75
OD_AXIS: 075
OS_AXIS: 015
OD_ADD: +2.25
OS_SPHERE: +2.00

## 2020-08-19 ASSESSMENT — CONFRONTATIONAL VISUAL FIELD TEST (CVF)
OD_FINDINGS: FULL
OS_FINDINGS: FULL

## 2020-08-19 ASSESSMENT — SPHEQUIV_DERIVED
OD_SPHEQUIV: 3
OS_SPHEQUIV: 2.5

## 2020-08-31 ENCOUNTER — DOCTOR'S OFFICE (OUTPATIENT)
Dept: URBAN - METROPOLITAN AREA CLINIC 136 | Facility: CLINIC | Age: 65
Setting detail: OPHTHALMOLOGY
End: 2020-08-31
Payer: COMMERCIAL

## 2020-08-31 DIAGNOSIS — H04.123: ICD-10-CM

## 2020-08-31 DIAGNOSIS — H43.391: ICD-10-CM

## 2020-08-31 DIAGNOSIS — H25.013: ICD-10-CM

## 2020-08-31 DIAGNOSIS — H35.362: ICD-10-CM

## 2020-08-31 DIAGNOSIS — H40.013: ICD-10-CM

## 2020-08-31 DIAGNOSIS — H43.812: ICD-10-CM

## 2020-08-31 DIAGNOSIS — H43.821: ICD-10-CM

## 2020-08-31 DIAGNOSIS — E11.9: ICD-10-CM

## 2020-08-31 DIAGNOSIS — H40.033: ICD-10-CM

## 2020-08-31 PROBLEM — H05.20 PROPTOSIS: Status: ACTIVE | Noted: 2020-03-02

## 2020-08-31 PROCEDURE — 92083 EXTENDED VISUAL FIELD XM: CPT | Performed by: OPHTHALMOLOGY

## 2020-08-31 PROCEDURE — 92014 COMPRE OPH EXAM EST PT 1/>: CPT | Performed by: OPHTHALMOLOGY

## 2020-08-31 ASSESSMENT — REFRACTION_CURRENTRX
OD_AXIS: 075
OS_ADD: +2.25
OD_ADD: +2.25
OS_OVR_VA: 20/
OS_CYLINDER: -0.25
OD_CYLINDER: -1.00
OS_AXIS: 015
OD_OVR_VA: 20/
OD_VPRISM_DIRECTION: PROGS
OS_SPHERE: +2.00
OS_VPRISM_DIRECTION: PROGS
OD_SPHERE: +2.75

## 2020-08-31 ASSESSMENT — REFRACTION_AUTOREFRACTION
OD_AXIS: 084
OS_AXIS: 093
OS_CYLINDER: -0.50
OS_SPHERE: +2.75
OD_SPHERE: +3.50
OD_CYLINDER: -1.00

## 2020-08-31 ASSESSMENT — CONFRONTATIONAL VISUAL FIELD TEST (CVF)
OS_FINDINGS: FULL
OD_FINDINGS: FULL

## 2020-08-31 ASSESSMENT — SPHEQUIV_DERIVED
OS_SPHEQUIV: 2.5
OD_SPHEQUIV: 3

## 2020-08-31 ASSESSMENT — VISUAL ACUITY
OS_BCVA: 20/20-2
OD_BCVA: 20/20-2

## 2020-09-03 ENCOUNTER — TELEPHONE (OUTPATIENT)
Dept: GYNECOLOGY | Facility: CLINIC | Age: 65
End: 2020-09-03

## 2020-09-03 ENCOUNTER — DOCUMENTATION (OUTPATIENT)
Dept: GYNECOLOGY | Facility: CLINIC | Age: 65
End: 2020-09-03

## 2020-09-03 DIAGNOSIS — N95.2 ATROPHIC VAGINITIS: ICD-10-CM

## 2020-09-03 RX ORDER — ESTRADIOL 0.1 MG/G
CREAM VAGINAL
Qty: 42.5 G | Refills: 3 | Status: SHIPPED | OUTPATIENT
Start: 2020-09-03 | End: 2021-12-13

## 2020-09-03 NOTE — TELEPHONE ENCOUNTER
Patient needs refill of generic Estradiol cream   Please send 3 month supply to [facility] on [address]  She also has new insurance  Medicare and [facility]

## 2020-10-01 DIAGNOSIS — Z12.31 ENCOUNTER FOR SCREENING MAMMOGRAM FOR MALIGNANT NEOPLASM OF BREAST: Primary | ICD-10-CM

## 2020-10-02 ENCOUNTER — DOCUMENTATION (OUTPATIENT)
Dept: GYNECOLOGY | Facility: CLINIC | Age: 65
End: 2020-10-02

## 2021-01-13 ENCOUNTER — HOSPITAL ENCOUNTER (OUTPATIENT)
Dept: MAMMOGRAPHY | Facility: MEDICAL CENTER | Age: 66
Discharge: HOME | End: 2021-01-13
Payer: MEDICARE

## 2021-01-13 VITALS — WEIGHT: 174 LBS | HEIGHT: 66 IN | BODY MASS INDEX: 27.97 KG/M2

## 2021-01-13 DIAGNOSIS — Z12.31 ENCOUNTER FOR SCREENING MAMMOGRAM FOR MALIGNANT NEOPLASM OF BREAST: ICD-10-CM

## 2021-01-13 PROCEDURE — 77067 SCR MAMMO BI INCL CAD: CPT

## 2021-01-13 PROCEDURE — 77063 BREAST TOMOSYNTHESIS BI: CPT

## 2021-06-17 DIAGNOSIS — Z13.820 ENCOUNTER FOR SCREENING FOR OSTEOPOROSIS: Primary | ICD-10-CM

## 2021-06-17 DIAGNOSIS — E28.39 ESTROGEN DEFICIENCY: ICD-10-CM

## 2021-06-24 ENCOUNTER — HOSPITAL ENCOUNTER (OUTPATIENT)
Dept: BONE DENSITY | Facility: MEDICAL CENTER | Age: 66
Discharge: HOME | End: 2021-06-24
Payer: MEDICARE

## 2021-06-24 DIAGNOSIS — E28.39 ESTROGEN DEFICIENCY: ICD-10-CM

## 2021-06-24 DIAGNOSIS — Z13.820 ENCOUNTER FOR SCREENING FOR OSTEOPOROSIS: ICD-10-CM

## 2021-06-24 PROCEDURE — 77080 DXA BONE DENSITY AXIAL: CPT

## 2021-12-13 DIAGNOSIS — N95.2 ATROPHIC VAGINITIS: ICD-10-CM

## 2021-12-14 RX ORDER — ESTRADIOL 0.1 MG/G
CREAM VAGINAL
Qty: 42.5 G | Refills: 0 | Status: SHIPPED | OUTPATIENT
Start: 2021-12-14 | End: 2022-05-03 | Stop reason: SDUPTHER

## 2021-12-17 ENCOUNTER — DOCUMENTATION (OUTPATIENT)
Dept: GYNECOLOGY | Facility: CLINIC | Age: 66
End: 2021-12-17

## 2022-01-25 ENCOUNTER — TELEPHONE (OUTPATIENT)
Dept: OTHER | Facility: OTHER | Age: 67
End: 2022-01-25

## 2022-01-25 NOTE — TELEPHONE ENCOUNTER
Patient is requiring a new order for her upcoming Mammogram   Please call patient with any questions

## 2022-01-26 DIAGNOSIS — Z12.31 ENCOUNTER FOR SCREENING MAMMOGRAM FOR MALIGNANT NEOPLASM OF BREAST: Primary | ICD-10-CM

## 2022-01-31 DIAGNOSIS — Z12.31 ENCOUNTER FOR SCREENING MAMMOGRAM FOR MALIGNANT NEOPLASM OF BREAST: Primary | ICD-10-CM

## 2022-02-01 ENCOUNTER — HOSPITAL ENCOUNTER (OUTPATIENT)
Dept: MAMMOGRAPHY | Facility: MEDICAL CENTER | Age: 67
Discharge: HOME | End: 2022-02-01
Payer: MEDICARE

## 2022-02-01 VITALS — WEIGHT: 173.94 LBS | BODY MASS INDEX: 27.95 KG/M2 | HEIGHT: 66 IN

## 2022-02-01 DIAGNOSIS — Z12.31 ENCOUNTER FOR SCREENING MAMMOGRAM FOR MALIGNANT NEOPLASM OF BREAST: ICD-10-CM

## 2022-02-01 PROCEDURE — 77067 SCR MAMMO BI INCL CAD: CPT

## 2022-02-01 PROCEDURE — 77063 BREAST TOMOSYNTHESIS BI: CPT

## 2022-02-03 ENCOUNTER — TELEPHONE (OUTPATIENT)
Dept: GYNECOLOGY | Facility: CLINIC | Age: 67
End: 2022-02-03

## 2022-05-03 ENCOUNTER — ANNUAL EXAM (OUTPATIENT)
Dept: GYNECOLOGY | Facility: CLINIC | Age: 67
End: 2022-05-03
Payer: MEDICARE

## 2022-05-03 VITALS — BODY MASS INDEX: 27.44 KG/M2 | DIASTOLIC BLOOD PRESSURE: 82 MMHG | WEIGHT: 170 LBS | SYSTOLIC BLOOD PRESSURE: 171 MMHG

## 2022-05-03 DIAGNOSIS — N95.2 ATROPHIC VAGINITIS: ICD-10-CM

## 2022-05-03 DIAGNOSIS — Z01.419 ENCOUNTER FOR GYNECOLOGICAL EXAMINATION WITHOUT ABNORMAL FINDING: Primary | ICD-10-CM

## 2022-05-03 PROCEDURE — G0101 CA SCREEN;PELVIC/BREAST EXAM: HCPCS | Performed by: OBSTETRICS & GYNECOLOGY

## 2022-05-03 RX ORDER — ESTRADIOL 0.1 MG/G
CREAM VAGINAL
Qty: 42.5 G | Refills: 0 | Status: SHIPPED | OUTPATIENT
Start: 2022-05-03

## 2022-05-03 NOTE — PROGRESS NOTES
Assessment/Plan:     Diagnoses and all orders for this visit:    Encounter for gynecological examination without abnormal finding    Atrophic vaginitis  -     estradiol (ESTRACE) 0 1 mg/g vaginal cream; 1 gram vaginally M,W,F x 3 weeks then once weekly      Osteopenia: Recheck DEXA scan June of 2023    Subjective:      Patient ID: Jackelin Huitron is a 77 y o  female  HPI  patient presents to the office for gyn exam   Her only complaint is that of increasing vaginal pressure  She denies any vaginal discharge or bleeding  She is status post total abdominal hysterectomy  Denies any dysuria, hematuria urgency or urinary incontinence  No GI complaints  She denies any need for stenting to void or to have a bowel movement  She had been seen Dr Mary Lemus but is upset with the care he is providing her and would like to remain here for ongoing gyn care  Colon cancer screening via colonoscopy 2018:  Normal: Repeat every 5 years secondary to family history     DEXA scan June 2021: Osteopenia    The following portions of the patient's history were reviewed and updated as appropriate:   She  has no past medical history on file  She There are no problems to display for this patient  She  has a past surgical history that includes Colonoscopy; Breast excisional biopsy (Right, 2013); Breast excisional biopsy (Left, 2009); Breast biopsy (Left, 2010); Breast biopsy (Right, 2006); and Total abdominal hysterectomy (age 52)  Her family history includes Bipolar disorder in her son; Breast cancer in her maternal aunt; Completed Suicide  in her son; Fibromyalgia in her mother; Heart disease in her father; No Known Problems in her maternal aunt, maternal aunt, maternal aunt, maternal grandfather, maternal grandmother, paternal grandfather, paternal grandmother, and sister; Pancreatic cancer in her mother  She  reports that she has quit smoking   She has never used smokeless tobacco  No history on file for alcohol use and drug use   Current Outpatient Medications   Medication Sig Dispense Refill    ALPRAZolam (XANAX) 0 5 mg tablet Take 0 5 mg by mouth 2 (two) times a day as needed      ammonium lactate (LAC-HYDRIN) 12 % lotion Apply topically      atorvastatin (LIPITOR) 10 mg tablet TAKE ONE TABLET BY MOUTH NIGHTLY   benzonatate (TESSALON PERLES) 100 mg capsule TAKE 2 CAPSULES BY MOUTH EVERY 8 HOURS AS NEEDED FOR COUGH  3    Calcium 500-125 MG-UNIT TABS three times daily      CARAFATE 1 GM/10ML suspension 10 MILLILITER BEFORE MEALS AND AT BEDTIME  3    Cholecalciferol (VITAMIN D3) 1000 units CAPS Take by mouth      CVS VITAMIN B-12 1000 MCG tablet Take 1,000 mcg by mouth daily  3    escitalopram (LEXAPRO) 10 mg tablet Take 5 mg by mouth daily      estradiol (ESTRACE) 0 1 mg/g vaginal cream 1 gram vaginally M,W,F x 3 weeks then once weekly 42 5 g 0    fluticasone (FLONASE) 50 mcg/act nasal spray 2 SPRAYS INTO EACH NOSTRIL DAILY   glucose blood (ACCU-CHEK SEGUN PLUS) test strip by In Vitro route      lidocaine (LIDODERM) 5 % PLACE 1 PATCH ON THE SKIN DAILY  REMOVE & DISCARD PATCH WITHIN 12 HOURS OR AS DIRECTED BY MD  3    lisinopril (ZESTRIL) 5 mg tablet Take 10 mg by mouth daily  1    LORazepam (ATIVAN) 1 mg tablet Take 1 or 2 tablet(s) twice a day needed for anxiety   ONE TOUCH ULTRA TEST test strip TEST 3 X DAILY  3    ONETOUCH DELICA LANCETS 92Y MISC TEST 3 X DAILY  3    pantoprazole (PROTONIX) 20 mg tablet Take 1 tablet by mouth daily      PROAIR  (90 Base) MCG/ACT inhaler 1 puff every 6 (six) hours as needed  2    traMADol (ULTRAM) 50 mg tablet Take 50 mg by mouth every 6 (six) hours as needed       No current facility-administered medications for this visit       Current Outpatient Medications on File Prior to Visit   Medication Sig    ALPRAZolam (XANAX) 0 5 mg tablet Take 0 5 mg by mouth 2 (two) times a day as needed    ammonium lactate (LAC-HYDRIN) 12 % lotion Apply topically    atorvastatin (LIPITOR) 10 mg tablet TAKE ONE TABLET BY MOUTH NIGHTLY   benzonatate (TESSALON PERLES) 100 mg capsule TAKE 2 CAPSULES BY MOUTH EVERY 8 HOURS AS NEEDED FOR COUGH    Calcium 500-125 MG-UNIT TABS three times daily    CARAFATE 1 GM/10ML suspension 10 MILLILITER BEFORE MEALS AND AT BEDTIME    Cholecalciferol (VITAMIN D3) 1000 units CAPS Take by mouth    CVS VITAMIN B-12 1000 MCG tablet Take 1,000 mcg by mouth daily    escitalopram (LEXAPRO) 10 mg tablet Take 5 mg by mouth daily    fluticasone (FLONASE) 50 mcg/act nasal spray 2 SPRAYS INTO EACH NOSTRIL DAILY   glucose blood (ACCU-CHEK SEGUN PLUS) test strip by In Vitro route    lidocaine (LIDODERM) 5 % PLACE 1 PATCH ON THE SKIN DAILY  REMOVE & DISCARD PATCH WITHIN 12 HOURS OR AS DIRECTED BY MD    lisinopril (ZESTRIL) 5 mg tablet Take 10 mg by mouth daily    LORazepam (ATIVAN) 1 mg tablet Take 1 or 2 tablet(s) twice a day needed for anxiety   ONE TOUCH ULTRA TEST test strip TEST 3 X DAILY    ONETOUCH DELICA LANCETS 07R MISC TEST 3 X DAILY    pantoprazole (PROTONIX) 20 mg tablet Take 1 tablet by mouth daily    PROAIR  (90 Base) MCG/ACT inhaler 1 puff every 6 (six) hours as needed    traMADol (ULTRAM) 50 mg tablet Take 50 mg by mouth every 6 (six) hours as needed    [DISCONTINUED] estradiol (ESTRACE) 0 1 mg/g vaginal cream APPLY 1-2  GRAMS VAGINALLY WEEKLY     No current facility-administered medications on file prior to visit  She is allergic to ibuprofen, penicillins, bupropion, and medical tape       Review of Systems   Constitutional: Negative  HENT: Negative for sore throat and trouble swallowing  Gastrointestinal: Negative  Genitourinary: Positive for vaginal pain (Vaginal pressure)  Negative for difficulty urinating, dysuria, enuresis, flank pain (See HPI), frequency, hematuria, pelvic pain, vaginal bleeding and vaginal discharge          See HPI         Objective:      BP (!) 171/82   Wt 77 1 kg (170 lb)   BMI 27 44 kg/m² Physical Exam  Vitals reviewed  Constitutional:       Appearance: Normal appearance  Cardiovascular:      Rate and Rhythm: Normal rate and regular rhythm  Pulses: Normal pulses  Heart sounds: Normal heart sounds  Pulmonary:      Effort: Pulmonary effort is normal  No respiratory distress  Breath sounds: Normal breath sounds  Chest:   Breasts:      Right: No swelling, bleeding, inverted nipple, mass, nipple discharge, skin change, tenderness, axillary adenopathy or supraclavicular adenopathy  Left: No swelling, bleeding, inverted nipple, mass, nipple discharge, skin change, tenderness, axillary adenopathy or supraclavicular adenopathy  Abdominal:      General: Abdomen is flat  There is no distension  Palpations: Abdomen is soft  There is no mass  Tenderness: There is no abdominal tenderness  There is no guarding or rebound  Hernia: No hernia is present  Genitourinary:     General: Normal vulva  Comments: Uterus and cervix surgically absent  No palpable adnexal masses or tenderness  Vagina with significant atrophic changes  No cystocele present  First-degree rectocele  Urethral orifice normal   Bartholin's and [place]'s glands normal  Musculoskeletal:      Cervical back: Normal range of motion and neck supple  No tenderness  Lymphadenopathy:      Cervical: No cervical adenopathy  Upper Body:      Right upper body: No supraclavicular, axillary or pectoral adenopathy  Left upper body: No supraclavicular, axillary or pectoral adenopathy  Neurological:      Mental Status: She is alert

## 2022-05-23 ENCOUNTER — TELEPHONE (OUTPATIENT)
Dept: GYNECOLOGY | Facility: CLINIC | Age: 67
End: 2022-05-23

## 2022-05-23 NOTE — TELEPHONE ENCOUNTER
Jessica called to see if records where sent to the office   Records are not here  I called Dr Manfred Chavez office at [phone]  And spoke to Dionne Benítez states they were faxed 5/3/2023 but will fax them over again to [phone]

## 2022-05-25 ENCOUNTER — TELEPHONE (OUTPATIENT)
Dept: GYNECOLOGY | Facility: CLINIC | Age: 67
End: 2022-05-25

## 2022-07-18 ENCOUNTER — DOCUMENTATION (OUTPATIENT)
Dept: GYNECOLOGY | Facility: CLINIC | Age: 67
End: 2022-07-18

## 2022-07-18 NOTE — PROGRESS NOTES
Pt called  She is having slight pain in her L breast no lumps not sure if it is muscular  When she pushes on it it feels better? Last mammo was normal  In 2/2022 no d/c    Do you want to see her? Or just order U/S?     Please advise

## 2022-07-19 DIAGNOSIS — N64.4 BREAST PAIN: Primary | ICD-10-CM

## 2022-08-11 ENCOUNTER — HOSPITAL ENCOUNTER (OUTPATIENT)
Dept: ULTRASOUND IMAGING | Facility: CLINIC | Age: 67
Discharge: HOME | End: 2022-08-11
Payer: MEDICARE

## 2022-08-11 ENCOUNTER — HOSPITAL ENCOUNTER (OUTPATIENT)
Dept: MAMMOGRAPHY | Facility: CLINIC | Age: 67
Discharge: HOME | End: 2022-08-11
Payer: MEDICARE

## 2022-08-11 VITALS — BODY MASS INDEX: 27.28 KG/M2 | HEIGHT: 66 IN | WEIGHT: 169.75 LBS

## 2022-08-11 DIAGNOSIS — N64.4 MASTODYNIA: ICD-10-CM

## 2022-08-11 PROCEDURE — G0279 TOMOSYNTHESIS, MAMMO: HCPCS

## 2022-08-11 PROCEDURE — 76642 ULTRASOUND BREAST LIMITED: CPT

## 2022-08-11 PROCEDURE — 77066 DX MAMMO INCL CAD BI: CPT

## 2022-08-31 ENCOUNTER — DOCUMENTATION (OUTPATIENT)
Dept: GYNECOLOGY | Facility: CLINIC | Age: 67
End: 2022-08-31

## 2022-08-31 ENCOUNTER — TELEPHONE (OUTPATIENT)
Dept: GYNECOLOGY | Facility: CLINIC | Age: 67
End: 2022-08-31

## 2022-08-31 DIAGNOSIS — B37.9 YEAST INFECTION: ICD-10-CM

## 2022-08-31 DIAGNOSIS — B37.9 YEAST INFECTION: Primary | ICD-10-CM

## 2022-08-31 RX ORDER — FLUCONAZOLE 150 MG/1
150 TABLET ORAL ONCE
Qty: 1 TABLET | Refills: 0 | Status: SHIPPED | OUTPATIENT
Start: 2022-08-31 | End: 2022-08-31

## 2022-08-31 RX ORDER — FLUCONAZOLE 150 MG/1
TABLET ORAL
Qty: 2 TABLET | Refills: 0 | Status: SHIPPED | OUTPATIENT
Start: 2022-08-31 | End: 2022-09-02

## 2022-08-31 NOTE — TELEPHONE ENCOUNTER
Patient called and states only one pill was called in for diflucan  She would like another pill sent to [facility] so she can take it a few days from now

## 2022-08-31 NOTE — PROGRESS NOTES
Pt called she was on marek antibiotic just finished yesterday this morning with vaginal irritation feels swollen,  itching  No d/c yet but very uncomfortable  Would like treatment , ASAP sent to [facility]  Something externally and fluconazole also

## 2022-09-02 DIAGNOSIS — B37.9 YEAST INFECTION: Primary | ICD-10-CM

## 2022-09-02 RX ORDER — FLUCONAZOLE 150 MG/1
150 TABLET ORAL
Qty: 2 TABLET | Refills: 0 | Status: SHIPPED | OUTPATIENT
Start: 2022-09-02 | End: 2022-09-06

## 2022-12-16 DIAGNOSIS — B37.9 YEAST INFECTION: ICD-10-CM

## 2023-01-16 DIAGNOSIS — Z12.31 ENCOUNTER FOR SCREENING MAMMOGRAM FOR BREAST CANCER: Primary | ICD-10-CM

## 2023-02-21 ENCOUNTER — TELEPHONE (OUTPATIENT)
Dept: GYNECOLOGY | Facility: CLINIC | Age: 68
End: 2023-02-21

## 2023-02-21 NOTE — TELEPHONE ENCOUNTER
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

## 2023-04-03 ENCOUNTER — TELEPHONE (OUTPATIENT)
Dept: OTHER | Facility: OTHER | Age: 68
End: 2023-04-03

## 2023-04-03 NOTE — TELEPHONE ENCOUNTER
"Patient called  States that \"screwed up everything at her job\" and her stomach started to hurt  Pt states had a normal BM, wiped really good, but, believes some residue went to her front  Patient states she is concerned about getting a UTI, would like Rx to be sent to [facility]     "

## 2023-04-04 DIAGNOSIS — R39.9 UTI SYMPTOMS: Primary | ICD-10-CM

## 2023-04-04 RX ORDER — NITROFURANTOIN 25; 75 MG/1; MG/1
100 CAPSULE ORAL 2 TIMES DAILY
Qty: 14 CAPSULE | Refills: 0 | Status: SHIPPED | OUTPATIENT
Start: 2023-04-04

## 2023-05-02 ENCOUNTER — OFFICE VISIT (OUTPATIENT)
Dept: GYNECOLOGY | Facility: CLINIC | Age: 68
End: 2023-05-02

## 2023-05-02 VITALS — HEART RATE: 89 BPM | SYSTOLIC BLOOD PRESSURE: 118 MMHG | DIASTOLIC BLOOD PRESSURE: 68 MMHG

## 2023-05-02 DIAGNOSIS — Z13.820 ENCOUNTER FOR SCREENING FOR OSTEOPOROSIS: ICD-10-CM

## 2023-05-02 DIAGNOSIS — Z12.31 ENCOUNTER FOR SCREENING MAMMOGRAM FOR BREAST CANCER: Primary | ICD-10-CM

## 2023-05-02 DIAGNOSIS — N95.2 ATROPHIC VAGINITIS: ICD-10-CM

## 2023-05-02 DIAGNOSIS — E11.69 CONTROLLED TYPE 2 DIABETES MELLITUS WITH OTHER SPECIFIED COMPLICATION, WITHOUT LONG-TERM CURRENT USE OF INSULIN (HCC): ICD-10-CM

## 2023-05-02 DIAGNOSIS — Z78.0 MENOPAUSE: ICD-10-CM

## 2023-05-02 DIAGNOSIS — M85.80 OSTEOPENIA, UNSPECIFIED LOCATION: ICD-10-CM

## 2023-05-02 RX ORDER — ESTRADIOL 0.1 MG/G
CREAM VAGINAL
Qty: 42.5 G | Refills: 0 | Status: SHIPPED | OUTPATIENT
Start: 2023-05-02

## 2023-05-02 NOTE — PROGRESS NOTES
Assessment/Plan:         Diagnoses and all orders for this visit:    Encounter for screening mammogram for breast cancer  -     Mammo screening bilateral w 3d & cad; Future    Menopause  -     DXA bone density spine hip and pelvis; Future    Osteopenia, unspecified location  -     DXA bone density spine hip and pelvis; Future    Encounter for screening for osteoporosis  -     DXA bone density spine hip and pelvis; Future    Atrophic vaginitis  -     estradiol (ESTRACE) 0 1 mg/g vaginal cream; 1 gram vaginally M,W,F x 3 weeks then once weekly    Controlled type 2 diabetes mellitus with other specified complication, without long-term current use of insulin ([facility])        Subjective:      Patient ID: Bella Gresham is a 79 y o  female  HPI patient presents the office complaining of vaginal dryness  She is status post total abdominal hysterectomy  She denies any vaginal discharge or bleeding  Denies any dysuria, hematuria urgency or urinary incontinence  No GI complaints  No recent antibiotics  The following portions of the patient's history were reviewed and updated as appropriate:   She  has no past medical history on file  She   Patient Active Problem List    Diagnosis Date Noted    Controlled type 2 diabetes mellitus with other specified complication, without long-term current use of insulin ([facility] ) 05/02/2023     She  has a past surgical history that includes Colonoscopy; Breast biopsy (Right, 03/01/2013); Breast biopsy (Left, 06/18/2009); Breast biopsy (Left, 2010); Breast biopsy (Right, 2006); Total abdominal hysterectomy (age 52); Breast cyst excision; Breast excisional biopsy (Left, 01/06/2011); and Breast excisional biopsy (Right, 03/19/2013)  Her family history includes Bipolar disorder in her son; Breast cancer in her maternal aunt;  Completed Suicide  in her son; Fibromyalgia in her mother; Heart disease in her father; No Known Problems in her maternal aunt, maternal aunt, maternal aunt, maternal grandfather, maternal grandmother, paternal grandfather, paternal grandmother, and sister; Pancreatic cancer in her mother  She  reports that she has quit smoking  She has never used smokeless tobacco  She reports that she does not currently use alcohol  She reports current drug use  Drug: Marijuana  Current Outpatient Medications   Medication Sig Dispense Refill    estradiol (ESTRACE) 0 1 mg/g vaginal cream 1 gram vaginally M,W,F x 3 weeks then once weekly 42 5 g 0    ALPRAZolam (XANAX) 0 5 mg tablet Take 0 5 mg by mouth 2 (two) times a day as needed      ammonium lactate (LAC-HYDRIN) 12 % lotion Apply topically      atorvastatin (LIPITOR) 10 mg tablet TAKE ONE TABLET BY MOUTH NIGHTLY   benzonatate (TESSALON PERLES) 100 mg capsule TAKE 2 CAPSULES BY MOUTH EVERY 8 HOURS AS NEEDED FOR COUGH  3    betamethasone valerate (VALISONE) 0 1 % cream APPLY TWICE DAILY 45 g 2    Calcium 500-125 MG-UNIT TABS three times daily      CARAFATE 1 GM/10ML suspension 10 MILLILITER BEFORE MEALS AND AT BEDTIME  3    Cholecalciferol (VITAMIN D3) 1000 units CAPS Take by mouth      CVS VITAMIN B-12 1000 MCG tablet Take 1,000 mcg by mouth daily  3    escitalopram (LEXAPRO) 10 mg tablet Take 5 mg by mouth daily      fluticasone (FLONASE) 50 mcg/act nasal spray 2 SPRAYS INTO EACH NOSTRIL DAILY   glucose blood test strip by In Vitro route      lidocaine (LIDODERM) 5 % PLACE 1 PATCH ON THE SKIN DAILY  REMOVE & DISCARD PATCH WITHIN 12 HOURS OR AS DIRECTED BY MD  3    lisinopril (ZESTRIL) 5 mg tablet Take 10 mg by mouth daily  1    LORazepam (ATIVAN) 1 mg tablet Take 1 or 2 tablet(s) twice a day needed for anxiety        nitrofurantoin (MACROBID) 100 mg capsule Take 1 capsule (100 mg total) by mouth 2 (two) times a day 14 capsule 0    ONE TOUCH ULTRA TEST test strip TEST 3 X DAILY  3    ONETOUCH DELICA LANCETS 91X MISC TEST 3 X DAILY  3    pantoprazole (PROTONIX) 20 mg tablet Take 1 tablet by mouth daily      PROAIR  (90 Base) MCG/ACT inhaler 1 puff every 6 (six) hours as needed  2    traMADol (ULTRAM) 50 mg tablet Take 50 mg by mouth every 6 (six) hours as needed       No current facility-administered medications for this visit  Current Outpatient Medications on File Prior to Visit   Medication Sig    ALPRAZolam (XANAX) 0 5 mg tablet Take 0 5 mg by mouth 2 (two) times a day as needed    ammonium lactate (LAC-HYDRIN) 12 % lotion Apply topically    atorvastatin (LIPITOR) 10 mg tablet TAKE ONE TABLET BY MOUTH NIGHTLY   benzonatate (TESSALON PERLES) 100 mg capsule TAKE 2 CAPSULES BY MOUTH EVERY 8 HOURS AS NEEDED FOR COUGH    betamethasone valerate (VALISONE) 0 1 % cream APPLY TWICE DAILY    Calcium 500-125 MG-UNIT TABS three times daily    CARAFATE 1 GM/10ML suspension 10 MILLILITER BEFORE MEALS AND AT BEDTIME    Cholecalciferol (VITAMIN D3) 1000 units CAPS Take by mouth    CVS VITAMIN B-12 1000 MCG tablet Take 1,000 mcg by mouth daily    escitalopram (LEXAPRO) 10 mg tablet Take 5 mg by mouth daily    fluticasone (FLONASE) 50 mcg/act nasal spray 2 SPRAYS INTO EACH NOSTRIL DAILY   glucose blood test strip by In Vitro route    lidocaine (LIDODERM) 5 % PLACE 1 PATCH ON THE SKIN DAILY  REMOVE & DISCARD PATCH WITHIN 12 HOURS OR AS DIRECTED BY MD    lisinopril (ZESTRIL) 5 mg tablet Take 10 mg by mouth daily    LORazepam (ATIVAN) 1 mg tablet Take 1 or 2 tablet(s) twice a day needed for anxiety      nitrofurantoin (MACROBID) 100 mg capsule Take 1 capsule (100 mg total) by mouth 2 (two) times a day    ONE TOUCH ULTRA TEST test strip TEST 3 X DAILY    ONETOUCH DELICA LANCETS 34V MISC TEST 3 X DAILY    pantoprazole (PROTONIX) 20 mg tablet Take 1 tablet by mouth daily    PROAIR  (90 Base) MCG/ACT inhaler 1 puff every 6 (six) hours as needed    traMADol (ULTRAM) 50 mg tablet Take 50 mg by mouth every 6 (six) hours as needed    [DISCONTINUED] estradiol (ESTRACE) 0 1 mg/g vaginal cream 1 gram vaginally M,W,F x 3 weeks then once weekly     No current facility-administered medications on file prior to visit  She is allergic to ibuprofen, penicillins, bupropion, and medical tape       Review of Systems   Constitutional: Negative  Gastrointestinal: Negative  Genitourinary:        See HPI         Objective:      /68   Pulse 89          Physical Exam  Vitals reviewed  Constitutional:       Appearance: Normal appearance  Chest:   Breasts:     Right: No swelling, bleeding, inverted nipple, mass, nipple discharge, skin change or tenderness  Left: No swelling, bleeding, inverted nipple, mass, nipple discharge, skin change or tenderness  Abdominal:      General: There is no distension  Palpations: Abdomen is soft  There is no mass  Tenderness: There is no abdominal tenderness  There is no guarding or rebound  Hernia: No hernia is present  There is no hernia in the left inguinal area or right inguinal area  Genitourinary:     General: Normal vulva  Labia:         Right: No rash, tenderness or lesion  Left: No rash, tenderness or lesion  Comments: Uterus and cervix surgically absent  No palpable adnexal masses or tenderness  Vagina with atrophic changes  Bartholin's and [place]'s glands normal   Urethral orifice normal   No cystocele or rectocele  Lymphadenopathy:      Upper Body:      Right upper body: No supraclavicular, axillary or pectoral adenopathy  Left upper body: No supraclavicular, axillary or pectoral adenopathy  Lower Body: No right inguinal adenopathy  No left inguinal adenopathy  Neurological:      Mental Status: She is alert

## 2023-08-15 ENCOUNTER — HOSPITAL ENCOUNTER (OUTPATIENT)
Dept: BONE DENSITY | Facility: MEDICAL CENTER | Age: 68
Discharge: HOME | End: 2023-08-15
Payer: MEDICARE

## 2023-08-15 ENCOUNTER — TELEPHONE (OUTPATIENT)
Dept: GYNECOLOGY | Facility: CLINIC | Age: 68
End: 2023-08-15

## 2023-08-15 ENCOUNTER — HOSPITAL ENCOUNTER (OUTPATIENT)
Dept: MAMMOGRAPHY | Facility: MEDICAL CENTER | Age: 68
Discharge: HOME | End: 2023-08-15
Payer: MEDICARE

## 2023-08-15 VITALS — HEIGHT: 66 IN | WEIGHT: 169.75 LBS | BODY MASS INDEX: 27.28 KG/M2

## 2023-08-15 DIAGNOSIS — Z12.31 ENCOUNTER FOR SCREENING MAMMOGRAM FOR BREAST CANCER: ICD-10-CM

## 2023-08-15 DIAGNOSIS — Z13.820 ENCOUNTER FOR SCREENING FOR OSTEOPOROSIS: ICD-10-CM

## 2023-08-15 DIAGNOSIS — M85.80 OSTEOPENIA, UNSPECIFIED LOCATION: ICD-10-CM

## 2023-08-15 DIAGNOSIS — Z78.0 MENOPAUSE: ICD-10-CM

## 2023-08-15 PROCEDURE — 77080 DXA BONE DENSITY AXIAL: CPT

## 2023-08-15 PROCEDURE — 77063 BREAST TOMOSYNTHESIS BI: CPT

## 2023-08-15 PROCEDURE — 77067 SCR MAMMO BI INCL CAD: CPT

## 2023-08-15 NOTE — TELEPHONE ENCOUNTER
Patient called and states she got mammogram and dexascan today and would like phone call as soon as results are released as she does not look at [employer].

## 2023-08-17 ENCOUNTER — TELEPHONE (OUTPATIENT)
Dept: GYNECOLOGY | Facility: CLINIC | Age: 68
End: 2023-08-17

## 2023-08-17 DIAGNOSIS — M81.0 OSTEOPOROSIS, UNSPECIFIED OSTEOPOROSIS TYPE, UNSPECIFIED PATHOLOGICAL FRACTURE PRESENCE: Primary | ICD-10-CM

## 2023-08-17 NOTE — TELEPHONE ENCOUNTER
Shirley Hyman is aware to have BW done and return to the office to discuss tx. Options. Scripts mailed out to Pt.

## 2023-08-17 NOTE — TELEPHONE ENCOUNTER
----- Message from Rashawn Gilman DO sent at 8/17/2023  1:02 PM EDT -----  DXA osteoporosis;  Check Vit D, PTH, CMP then RTO discuss treatment

## 2023-10-31 ENCOUNTER — HOSPITAL ENCOUNTER (OUTPATIENT)
Dept: NON INVASIVE DIAGNOSTICS | Facility: HOSPITAL | Age: 68
Discharge: HOME | End: 2023-10-31
Payer: MEDICARE

## 2023-10-31 DIAGNOSIS — M79.604 PAIN IN RIGHT LEG: ICD-10-CM

## 2023-10-31 PROCEDURE — 93971 EXTREMITY STUDY: CPT

## 2023-10-31 PROCEDURE — 93971 EXTREMITY STUDY: CPT | Performed by: SURGERY

## 2023-11-22 DIAGNOSIS — N95.2 ATROPHIC VAGINITIS: ICD-10-CM

## 2023-11-24 RX ORDER — ESTRADIOL 0.1 MG/G
CREAM VAGINAL
Qty: 42.5 G | Refills: 0 | Status: SHIPPED | OUTPATIENT
Start: 2023-11-24

## 2024-04-26 ENCOUNTER — TELEPHONE (OUTPATIENT)
Age: 69
End: 2024-04-26

## 2024-04-26 ENCOUNTER — TELEPHONE (OUTPATIENT)
Dept: GYNECOLOGY | Facility: CLINIC | Age: 69
End: 2024-04-26

## 2024-04-26 NOTE — TELEPHONE ENCOUNTER
Called pt regarding blood work. I notified her Vit D, PTH, and CMP was ordered in August of 2023. [facility] faxed over blood work but those were orders from another provider. Pt states she will call family doctor to have results faxed over. Once results are in and reviewed by the doctor they will either be discussed at her upcoming appointment in May or we will call her. 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

## 2024-04-26 NOTE — TELEPHONE ENCOUNTER
Patient called office to ask if labs (CMP, Vitamin D, intact and Calcium) needed to be reordered. I explained they were still active in her chart from 8/2023. She said she had multiple labs drawn at [facility] in March and was told these labs did not have to be done because they were included in these labs. I explained the only labs in her chart from [facility] are lipids.     Called [facility] labs, spoke with [place]. Multiple lab results are to be faxed to central fax [phone].     Routing call to provider, patient would like to know if she still needs to have Vitamin D, CMP, PTH, Intact and Calcium drawn.  Patient call back : [phone]

## 2024-05-14 ENCOUNTER — ANNUAL EXAM (OUTPATIENT)
Dept: GYNECOLOGY | Facility: CLINIC | Age: 69
End: 2024-05-14
Payer: MEDICARE

## 2024-05-14 VITALS
HEART RATE: 86 BPM | DIASTOLIC BLOOD PRESSURE: 60 MMHG | BODY MASS INDEX: 24.11 KG/M2 | SYSTOLIC BLOOD PRESSURE: 102 MMHG | WEIGHT: 150 LBS | HEIGHT: 66 IN

## 2024-05-14 DIAGNOSIS — N95.2 ATROPHIC VAGINITIS: ICD-10-CM

## 2024-05-14 DIAGNOSIS — Z01.419 ENCOUNTER FOR GYNECOLOGICAL EXAMINATION WITHOUT ABNORMAL FINDING: Primary | ICD-10-CM

## 2024-05-14 DIAGNOSIS — Z13.820 ENCOUNTER FOR SCREENING FOR OSTEOPOROSIS: ICD-10-CM

## 2024-05-14 DIAGNOSIS — M85.80 OSTEOPENIA, UNSPECIFIED LOCATION: ICD-10-CM

## 2024-05-14 DIAGNOSIS — Z12.31 ENCOUNTER FOR SCREENING MAMMOGRAM FOR MALIGNANT NEOPLASM OF BREAST: ICD-10-CM

## 2024-05-14 DIAGNOSIS — Z78.0 MENOPAUSE: ICD-10-CM

## 2024-05-14 PROCEDURE — G0101 CA SCREEN;PELVIC/BREAST EXAM: HCPCS | Performed by: OBSTETRICS & GYNECOLOGY

## 2024-05-14 RX ORDER — ESTRADIOL 0.1 MG/G
CREAM VAGINAL
Qty: 42.5 G | Refills: 3 | Status: SHIPPED | OUTPATIENT
Start: 2024-05-14

## 2024-05-14 RX ORDER — VITAMIN E 268 MG
400 CAPSULE ORAL DAILY
COMMUNITY

## 2024-05-14 RX ORDER — CHLORTHALIDONE 25 MG/1
12.5 TABLET ORAL DAILY
COMMUNITY

## 2024-05-14 RX ORDER — DIPHENOXYLATE HYDROCHLORIDE AND ATROPINE SULFATE 2.5; .025 MG/1; MG/1
1 TABLET ORAL DAILY
COMMUNITY

## 2024-05-14 NOTE — PROGRESS NOTES
Assessment/Plan:       Diagnoses and all orders for this visit:    Encounter for gynecological examination without abnormal finding    Encounter for screening mammogram for malignant neoplasm of breast  -     Mammo screening bilateral w 3d & cad; Future    Encounter for screening for osteoporosis  -     DXA bone density spine hip and pelvis; Future    Menopause  -     DXA bone density spine hip and pelvis; Future    Atrophic vaginitis    Osteopenia, unspecified location      Subjective:      Patient ID: Jessica Cartagena is a 68 y.o. female.    HPI patient presents for annual examination.  She offers no complaints.  Status post total abdominal hysterectomy.  Denies any vaginal discharge or bleeding.  She has been using Estrace cream 1 g vaginal weekly.  Denies any dysuria, hematuria urgency or urinary incontinence.  No GI complaints.    Colonoscopy May 2023.  Polyps identified.  Advised repeat in 3 years.     August 2023;.  Osteoporosis forearm. Recheck DEXA 8/2025    The following portions of the patient's history were reviewed and updated as appropriate: She  has no past medical history on file.  She   Patient Active Problem List    Diagnosis Date Noted    Controlled type 2 diabetes mellitus with other specified complication, without long-term current use of insulin (Formerly [facility]) 05/02/2023     She  has a past surgical history that includes Colonoscopy; Breast biopsy (Right, 03/01/2013); Breast biopsy (Left, 06/18/2009); Breast biopsy (Left, 2010); Breast biopsy (Right, 2006); Total abdominal hysterectomy (age 49); Breast cyst excision; Breast excisional biopsy (Left, 01/06/2011); and Breast excisional biopsy (Right, 03/19/2013).  Her family history includes Bipolar disorder in her son; Breast cancer in her maternal aunt and maternal aunt; Completed Suicide  in her son; Fibromyalgia in her mother; Heart disease in her father; No Known Problems in her maternal aunt, maternal aunt, maternal grandfather, maternal grandmother,  paternal grandfather, paternal grandmother, and sister; Pancreatic cancer (age of onset: 68) in her mother.  She  reports that she has quit smoking. She has never used smokeless tobacco. She reports that she does not currently use alcohol. She reports current drug use. Drug: Marijuana.  Current Outpatient Medications   Medication Sig Dispense Refill    benzonatate (TESSALON PERLES) 100 mg capsule TAKE 2 CAPSULES BY MOUTH EVERY 8 HOURS AS NEEDED FOR COUGH  3    betamethasone valerate (VALISONE) 0.1 % cream APPLY TWICE DAILY 45 g 2    Calcium 500-125 MG-UNIT TABS three times daily      [facility] VITAMIN B-12 1000 MCG tablet Take 1,000 mcg by mouth daily  3    estradiol (ESTRACE) 0.1 mg/g vaginal cream INSERT 1 GRAM VAGINALLY MONDAY WEDNESDAY FRIDAY X 3 WEEKS THEN ONCE WEEKLY 42.5 g 0    fluticasone (FLONASE) 50 mcg/act nasal spray 2 SPRAYS INTO EACH NOSTRIL DAILY.      glucose blood test strip by In Vitro route      LORazepam (ATIVAN) 1 mg tablet Take 1 or 2 tablet(s) twice a day needed for anxiety.      ONE TOUCH ULTRA TEST test strip TEST 3 X DAILY  3    ONETOUCH DELICA LANCETS 33G MISC TEST 3 X DAILY  3    pantoprazole (PROTONIX) 20 mg tablet Take 1 tablet by mouth daily      PROAIR  (90 Base) MCG/ACT inhaler 1 puff every 6 (six) hours as needed  2    ALPRAZolam (XANAX) 0.5 mg tablet Take 0.5 mg by mouth 2 (two) times a day as needed (Patient not taking: Reported on 5/14/2024)      ammonium lactate (LAC-HYDRIN) 12 % lotion Apply topically      atorvastatin (LIPITOR) 10 mg tablet TAKE ONE TABLET BY MOUTH NIGHTLY. (Patient not taking: Reported on 5/14/2024)      CARAFATE 1 GM/10ML suspension 10 MILLILITER BEFORE MEALS AND AT BEDTIME (Patient not taking: Reported on 5/14/2024)  3    Cholecalciferol (VITAMIN D3) 1000 units CAPS Take by mouth (Patient not taking: Reported on 5/14/2024)      escitalopram (LEXAPRO) 10 mg tablet Take 5 mg by mouth daily (Patient not taking: Reported on 5/14/2024)      lidocaine (LIDODERM)  5 % PLACE 1 PATCH ON THE SKIN DAILY. REMOVE & DISCARD PATCH WITHIN 12 HOURS OR AS DIRECTED BY MD (Patient not taking: Reported on 5/14/2024)  3    lisinopril (ZESTRIL) 5 mg tablet Take 10 mg by mouth daily  1    nitrofurantoin (MACROBID) 100 mg capsule Take 1 capsule (100 mg total) by mouth 2 (two) times a day 14 capsule 0    traMADol (ULTRAM) 50 mg tablet Take 50 mg by mouth every 6 (six) hours as needed       No current facility-administered medications for this visit.     Current Outpatient Medications on File Prior to Visit   Medication Sig    benzonatate (TESSALON PERLES) 100 mg capsule TAKE 2 CAPSULES BY MOUTH EVERY 8 HOURS AS NEEDED FOR COUGH    betamethasone valerate (VALISONE) 0.1 % cream APPLY TWICE DAILY    Calcium 500-125 MG-UNIT TABS three times daily    [employer] VITAMIN B-12 1000 MCG tablet Take 1,000 mcg by mouth daily    estradiol (ESTRACE) 0.1 mg/g vaginal cream INSERT 1 GRAM VAGINALLY MONDAY WEDNESDAY FRIDAY X 3 WEEKS THEN ONCE WEEKLY    fluticasone (FLONASE) 50 mcg/act nasal spray 2 SPRAYS INTO EACH NOSTRIL DAILY.    glucose blood test strip by In Vitro route    LORazepam (ATIVAN) 1 mg tablet Take 1 or 2 tablet(s) twice a day needed for anxiety.    ONE TOUCH ULTRA TEST test strip TEST 3 X DAILY    ONETOUCH DELICA LANCETS 33G MISC TEST 3 X DAILY    pantoprazole (PROTONIX) 20 mg tablet Take 1 tablet by mouth daily    PROAIR  (90 Base) MCG/ACT inhaler 1 puff every 6 (six) hours as needed    ALPRAZolam (XANAX) 0.5 mg tablet Take 0.5 mg by mouth 2 (two) times a day as needed (Patient not taking: Reported on 5/14/2024)    ammonium lactate (LAC-HYDRIN) 12 % lotion Apply topically    atorvastatin (LIPITOR) 10 mg tablet TAKE ONE TABLET BY MOUTH NIGHTLY. (Patient not taking: Reported on 5/14/2024)    CARAFATE 1 GM/10ML suspension 10 MILLILITER BEFORE MEALS AND AT BEDTIME (Patient not taking: Reported on 5/14/2024)    Cholecalciferol (VITAMIN D3) 1000 units CAPS Take by mouth (Patient not taking: Reported  "on 5/14/2024)    escitalopram (LEXAPRO) 10 mg tablet Take 5 mg by mouth daily (Patient not taking: Reported on 5/14/2024)    lidocaine (LIDODERM) 5 % PLACE 1 PATCH ON THE SKIN DAILY. REMOVE & DISCARD PATCH WITHIN 12 HOURS OR AS DIRECTED BY MD (Patient not taking: Reported on 5/14/2024)    lisinopril (ZESTRIL) 5 mg tablet Take 10 mg by mouth daily    nitrofurantoin (MACROBID) 100 mg capsule Take 1 capsule (100 mg total) by mouth 2 (two) times a day    traMADol (ULTRAM) 50 mg tablet Take 50 mg by mouth every 6 (six) hours as needed     No current facility-administered medications on file prior to visit.     She is allergic to ibuprofen, penicillins, bupropion, and medical tape..    Review of Systems   Constitutional: Negative.    HENT:  Negative for sore throat and trouble swallowing.    Gastrointestinal: Negative.    Genitourinary: Negative.          Objective:      /60   Pulse 86   Ht 5' 6\" (1.676 m)   Wt 68 kg (150 lb) Comment: Pt. states She is 150 and declined getting on the scale  BMI 24.21 kg/m²          Physical Exam  Vitals reviewed.   Cardiovascular:      Rate and Rhythm: Normal rate and regular rhythm.      Pulses: Normal pulses.      Heart sounds: Normal heart sounds. No murmur heard.  Pulmonary:      Effort: Pulmonary effort is normal. No respiratory distress.      Breath sounds: Normal breath sounds.   Chest:   Breasts:     Right: No swelling, bleeding, inverted nipple, mass, nipple discharge, skin change or tenderness.      Left: No swelling, bleeding, inverted nipple, mass, nipple discharge, skin change or tenderness.   Abdominal:      General: There is no distension.      Palpations: Abdomen is soft. There is no mass.      Tenderness: There is no abdominal tenderness. There is no guarding or rebound.      Hernia: No hernia is present. There is no hernia in the left inguinal area or right inguinal area.   Genitourinary:     General: Normal vulva.      Labia:         Right: No rash, tenderness " or lesion.         Left: No rash, tenderness or lesion.       Comments: Uterus and cervix surgically absent.  No palpable adnexal masses or tenderness.  Vagina with atrophic changes.  Bartholin's and Celeste's glands normal.  Urethral orifice normal.  No cystocele or rectocele.  Musculoskeletal:      Cervical back: Normal range of motion and neck supple. No tenderness.   Lymphadenopathy:      Cervical: No cervical adenopathy.      Upper Body:      Right upper body: No supraclavicular, axillary or pectoral adenopathy.      Left upper body: No supraclavicular, axillary or pectoral adenopathy.      Lower Body: No right inguinal adenopathy. No left inguinal adenopathy.   Neurological:      Mental Status: She is alert.

## 2024-06-24 ENCOUNTER — TELEPHONE (OUTPATIENT)
Dept: INTERNAL MEDICINE CLINIC | Facility: OTHER | Age: 69
End: 2024-06-24

## 2024-06-24 NOTE — TELEPHONE ENCOUNTER
Patient under the care of [facility]. Please update patient general pcp within her chart.

## 2024-06-25 NOTE — TELEPHONE ENCOUNTER
06/25/24 10:42 AM        The office's request has been received, reviewed, and the patient chart updated. The PCP has successfully been removed with a patient attribution note. This message will now be completed.        Thank you  Alan Sullivan

## 2024-08-20 ENCOUNTER — HOSPITAL ENCOUNTER (OUTPATIENT)
Dept: MAMMOGRAPHY | Facility: MEDICAL CENTER | Age: 69
Discharge: HOME | End: 2024-08-20
Payer: MEDICARE

## 2024-08-20 ENCOUNTER — TELEPHONE (OUTPATIENT)
Age: 69
End: 2024-08-20

## 2024-08-20 VITALS — BODY MASS INDEX: 24.09 KG/M2 | HEIGHT: 66 IN | WEIGHT: 149.91 LBS

## 2024-08-20 DIAGNOSIS — Z12.31 ENCOUNTER FOR SCREENING MAMMOGRAM FOR MALIGNANT NEOPLASM OF BREAST: ICD-10-CM

## 2024-08-20 PROCEDURE — 77067 SCR MAMMO BI INCL CAD: CPT

## 2024-08-20 PROCEDURE — 77063 BREAST TOMOSYNTHESIS BI: CPT

## 2024-08-20 NOTE — TELEPHONE ENCOUNTER
Patient calling to let Dr Zamora know that there was something abnormal found in her right breast during her mammogram today and they cannot see her until October, she is unable to go on 8/21, and there is nothing until then. She would like to know if there is anything he can do or if she should attempt to go early. She would like a call back to review options.

## 2024-08-21 ENCOUNTER — TELEPHONE (OUTPATIENT)
Dept: OBGYN CLINIC | Facility: CLINIC | Age: 69
End: 2024-08-21

## 2024-08-21 NOTE — TELEPHONE ENCOUNTER
----- Message from Damien Franklin DO sent at 8/20/2024 10:43 PM EDT -----  Regarding: FW:  Additional imaging breast  ----- Message -----  From: Lucy Mcallister MD  Sent: 8/20/2024   2:54 PM EDT  To: Damien Franklin DO

## 2024-08-22 NOTE — TELEPHONE ENCOUNTER
Called the patient to further discuss but there was no answer. Left a brief message stating that Dr. Zamora is in agreement with the recommendation for the diagnostic mammogram. Provided the phone number for central scheduling so she could see if there is a sooner appointment available. The number for the hope line was also provided if she had any additional questions.

## 2024-10-14 ENCOUNTER — HOSPITAL ENCOUNTER (OUTPATIENT)
Dept: MAMMOGRAPHY | Facility: CLINIC | Age: 69
Discharge: HOME | End: 2024-10-14
Payer: MEDICARE

## 2024-10-14 VITALS — WEIGHT: 149.91 LBS | HEIGHT: 66 IN | BODY MASS INDEX: 24.09 KG/M2

## 2024-10-14 DIAGNOSIS — R92.8 ABNORMAL SCREENING MAMMOGRAM: ICD-10-CM

## 2024-10-14 PROCEDURE — 77065 DX MAMMO INCL CAD UNI: CPT

## 2024-10-17 ENCOUNTER — TELEPHONE (OUTPATIENT)
Dept: HEMATOLOGY ONCOLOGY | Facility: CLINIC | Age: 69
End: 2024-10-17

## 2024-10-17 NOTE — TELEPHONE ENCOUNTER
Referral to Surgical Oncology received.  Chart reviewed by  for Surgical oncology at this time.       Diagnosis:   Diagnosis   R92.8 (ICD-10-CM) - Abnormal findings on diagnostic imaging of breast     After review of chart, instructions for scheduling added to referral and sent to be scheduled as advised.

## 2024-11-26 ENCOUNTER — OFFICE VISIT (OUTPATIENT)
Dept: SURGICAL ONCOLOGY | Facility: CLINIC | Age: 69
End: 2024-11-26
Payer: MEDICARE

## 2024-11-26 VITALS
TEMPERATURE: 97.2 F | HEART RATE: 78 BPM | DIASTOLIC BLOOD PRESSURE: 72 MMHG | OXYGEN SATURATION: 97 % | SYSTOLIC BLOOD PRESSURE: 120 MMHG | HEIGHT: 66 IN | BODY MASS INDEX: 24.2 KG/M2

## 2024-11-26 DIAGNOSIS — N64.4 BREAST PAIN: ICD-10-CM

## 2024-11-26 DIAGNOSIS — Z80.3 FAMILY HISTORY OF BREAST CANCER: ICD-10-CM

## 2024-11-26 DIAGNOSIS — Z80.0 FAMILY HISTORY OF PANCREATIC CANCER: ICD-10-CM

## 2024-11-26 DIAGNOSIS — Z91.89 INCREASED RISK OF BREAST CANCER: ICD-10-CM

## 2024-11-26 DIAGNOSIS — N60.99 ATYPICAL DUCTAL HYPERPLASIA OF BREAST: Primary | ICD-10-CM

## 2024-11-26 DIAGNOSIS — R92.8 ABNORMAL MAMMOGRAM: ICD-10-CM

## 2024-11-26 PROCEDURE — 99204 OFFICE O/P NEW MOD 45 MIN: CPT

## 2024-11-26 RX ORDER — FAMOTIDINE 20 MG/1
TABLET, FILM COATED ORAL EVERY 12 HOURS
COMMUNITY

## 2024-11-26 NOTE — PROGRESS NOTES
Name: Jessica Cartagena      : 1955      MRN: [id number]  Encounter Provider: TARSHA Álvarez  Encounter Date: 2024   Encounter department: [facility] [place]  :  Assessment & Plan  History of atypical ductal hyperplasia of breast  - annual breast exam with our office  Orders:    MRI breast bilateral w and wo contrast w cad; Future    Basic metabolic panel; Future    Abnormal mammogram  - q6 mo mammo secondary to calcifications   Orders:    Mammo diagnostic bilateral w 3d and cad; Future    Basic metabolic panel; Future    Family history of breast cancer  Orders:    Ambulatory Referral to Oncology Genetics; Future    MRI breast bilateral w and wo contrast w cad; Future    Family history of pancreatic cancer  Orders:    Ambulatory Referral to Oncology Genetics; Future    Increased risk of breast cancer  Orders:    MRI breast bilateral w and wo contrast w cad; Future    Breast pain  - Evening primrose oil, warm compress, supportive bra, decrease salt and caffeine            History of Present Illness     HPI  Ms. Jessica Cartagena is a 69 y.o. female presenting today for evaluation after self-referral for an abnormal finding on breast imaging. She had a bilateral screening mammogram completed on 2024 demonstrated BI-RADS 2 for the left breast, BI-RADS 0 for the right breast due to questionably increased calcifications compared to priors. Follow up diagnostic right mammogram on 10/14/2024 displayed BI-RADS 3, probably benign appearing calcifications and recommendation for 6-month follow up with diagnostic mammogram. We discussed findings and that they are typically followed at 6 month intervals for up to 2 years to confirm stability. Of note, pt has a personal hx of atypical ductal hyperplasia (ADH) and atypical lobular hyperplasia (ALH), diagnosed in  and  respectively, in the right and left breast. These were excised with Dr. Zamora at that time.  She has been lost to follow up. With this, she is considered high risk and TC is estimated to be above 20%, meeting criteria for enhanced breast screening with annual alternating mammogram and bilateral breast MRI. We will plan for MRI in February of 2025 and annual mammogram in Aug 2025. Patient has complaints of breast pain bilaterally. This may be r/t scar tissue from previous lumpectomy or fibrocystic changes. She mentions excess caffeine and salt intake. I recommended warm compress, a supportive bra, EPO, and decreasing salt and caffeine. She mentions weight gain over the past few weeks. She is going to follow up with her pcp. Pt does not have a personal hx of cancer, however her family hx is notable for breast cancer in her (2) maternal aunts, as well as pancreatic cancer in her mother (dx age 68). No known genetic testing was completed within the family. She was agreeable to undergo genetic testing at this time. Referral was placed. There were no concerning findings upon clinical breast exam (CBE) today.  S/s of breast cancer were reviewed. I encouraged pt to promptly call if any questions or concerns prior to next scheduled appmt. I will see the patient back in 1 year or sooner should the need arise. All questions were answered today.    OBGYN hx was reviewed, including: age of menarche (unknown), 2 live births, age at first birth was 23, age of menopause was 49 (surgically induced s/p hysterectomy), she denies HRT, VON exposure, or mantle field RT.       Review of Systems   Constitutional:  Negative for activity change, appetite change, chills, fatigue, fever and unexpected weight change.   HENT:  Positive for congestion and ear pain. Negative for sore throat.    Eyes:  Negative for pain and visual disturbance.   Respiratory:  Negative for cough and shortness of breath.    Cardiovascular:  Negative for chest pain and palpitations.   Gastrointestinal:  Negative for abdominal pain, diarrhea, nausea and  vomiting.   Endocrine: Negative for heat intolerance.   Genitourinary:  Negative for dysuria and hematuria.   Musculoskeletal:  Positive for back pain, joint swelling and neck stiffness. Negative for arthralgias and myalgias.        Fibromyalgia     Skin:  Negative for color change and rash.   Neurological:  Negative for seizures, syncope, weakness and headaches.   Hematological:  Negative for adenopathy.   Psychiatric/Behavioral:  The patient is nervous/anxious.    All other systems reviewed and are negative.         Objective   There were no vitals taken for this visit.     Physical Exam  Vitals and nursing note reviewed.   Constitutional:       General: She is not in acute distress.     Appearance: She is well-developed.   HENT:      Head: Normocephalic and atraumatic.   Eyes:      Conjunctiva/sclera: Conjunctivae normal.   Cardiovascular:      Rate and Rhythm: Normal rate and regular rhythm.      Heart sounds: No murmur heard.  Pulmonary:      Effort: Pulmonary effort is normal. No respiratory distress.      Breath sounds: Normal breath sounds.   Chest:      Chest wall: No mass.   Breasts:     Right: Tenderness present. No swelling, bleeding, inverted nipple, mass, nipple discharge or skin change.      Left: Tenderness present. No swelling, bleeding, inverted nipple, mass, nipple discharge or skin change.       Abdominal:      Palpations: Abdomen is soft.      Tenderness: There is no abdominal tenderness.   Musculoskeletal:         General: No swelling.      Cervical back: Neck supple.   Lymphadenopathy:      Upper Body:      Right upper body: No supraclavicular or axillary adenopathy.      Left upper body: No supraclavicular or axillary adenopathy.   Skin:     General: Skin is warm and dry.      Capillary Refill: Capillary refill takes less than 2 seconds.   Neurological:      Mental Status: She is alert.   Psychiatric:         Mood and Affect: Mood normal.         Behavior: Behavior normal.         Thought  Content: Thought content normal.         Judgment: Judgment normal.

## 2024-11-26 NOTE — ASSESSMENT & PLAN NOTE
- q6 mo mammo secondary to calcifications   Orders:    Mammo diagnostic bilateral w 3d and cad; Future    Basic metabolic panel; Future

## 2024-11-26 NOTE — ASSESSMENT & PLAN NOTE
- annual breast exam with our office  Orders:    MRI breast bilateral w and wo contrast w cad; Future    Basic metabolic panel; Future

## 2024-12-05 ENCOUNTER — TELEPHONE (OUTPATIENT)
Dept: GENETICS | Facility: CLINIC | Age: 69
End: 2024-12-05

## 2024-12-05 NOTE — TELEPHONE ENCOUNTER
Jessica called to schedule a new patient appointment with the Cancer Risk and Genetics Program.      Outcome:  Genetics appointment scheduled for 4/23/2024 10:00 AM with Ariana.    Personal/Family History Related to Appointment:  Family history of Breast and Pancreatic Cancer.    History of Genetic Testing:  Patient reports no personal or family history of genetic testing    Genetics Family History Questionnaire:  I confirmed the patient's e-mail on file as the best e-mail to send an invite link for our genetics family history intake

## 2025-01-31 ENCOUNTER — TELEPHONE (OUTPATIENT)
Age: 70
End: 2025-01-31

## 2025-01-31 NOTE — TELEPHONE ENCOUNTER
Call received from patient. She is concerned about her MRI on Sunday and being able to handle it without feeling claustrophobic. Advised patient that we can order medication for her so she can withstand the MRI but patient stated she is first going to try and see if she can handle it without medication and if not she will call our office back to get something to help her.

## 2025-02-02 ENCOUNTER — HOSPITAL ENCOUNTER (OUTPATIENT)
Dept: RADIOLOGY | Facility: HOSPITAL | Age: 70
Discharge: HOME | End: 2025-02-02
Payer: MEDICARE

## 2025-02-02 DIAGNOSIS — Z80.3 FAMILY HISTORY OF BREAST CANCER: ICD-10-CM

## 2025-02-02 DIAGNOSIS — N60.99 ATYPICAL DUCTAL HYPERPLASIA OF BREAST: ICD-10-CM

## 2025-02-02 DIAGNOSIS — Z91.89 INCREASED RISK OF BREAST CANCER: ICD-10-CM

## 2025-02-02 PROCEDURE — C8908 MRI W/O FOL W/CONT, BREAST,: HCPCS

## 2025-02-02 PROCEDURE — C8937 CAD BREAST MRI: HCPCS

## 2025-02-02 PROCEDURE — A9585 GADOBUTROL INJECTION: HCPCS

## 2025-02-02 RX ORDER — GADOBUTROL 604.72 MG/ML
6 INJECTION INTRAVENOUS
Status: COMPLETED | OUTPATIENT
Start: 2025-02-02 | End: 2025-02-02

## 2025-02-02 RX ADMIN — GADOBUTROL 6 ML: 604.72 INJECTION INTRAVENOUS at 13:19

## 2025-02-03 ENCOUNTER — RESULTS FOLLOW-UP (OUTPATIENT)
Dept: SURGICAL ONCOLOGY | Facility: CLINIC | Age: 70
End: 2025-02-03

## 2025-02-03 DIAGNOSIS — R92.8 ABNORMAL MRI, BREAST: Primary | ICD-10-CM

## 2025-02-04 NOTE — TELEPHONE ENCOUNTER
Call received from patient. She is requesting a call back again from Gricelda WILLINGHAM regarding the results of her MRI and requesting her to go over it all with her.

## 2025-02-04 NOTE — RESULT ENCOUNTER NOTE
Called patient to discuss results. Spoke to her yesterday as well. All questions answered. Orders for biopsies are in.

## 2025-02-05 ENCOUNTER — HOSPITAL ENCOUNTER (OUTPATIENT)
Dept: ULTRASOUND IMAGING | Facility: CLINIC | Age: 70
Discharge: HOME | End: 2025-02-05
Payer: MEDICARE

## 2025-02-05 DIAGNOSIS — R92.8 ABNORMAL MRI, BREAST: ICD-10-CM

## 2025-02-05 PROCEDURE — 76642 ULTRASOUND BREAST LIMITED: CPT

## 2025-02-05 NOTE — PROGRESS NOTES
Met with patient and   regarding recommendation for;    __x2___ RIGHT __x2____LEFT      ___x__Ultrasound guided  ______Stereotactic breast biopsy.      __X___Verbalized understanding.    Reviewed clip placement with patient, pt states understanding: Yes: __x__ No: ____  Comments:    Blood thinners:  No: __x___ Yes: ______ What:          Biopsy teaching sheet given:  Yes: ___X___ No: ________    Pt given contact information and adv to call with any questions/needs    Patient advised to arrive at 12:00... for a 1230... appointment

## 2025-02-14 ENCOUNTER — APPOINTMENT (OUTPATIENT)
Dept: ULTRASOUND IMAGING | Facility: CLINIC | Age: 70
End: 2025-02-14
Payer: MEDICARE

## 2025-02-14 ENCOUNTER — HOSPITAL ENCOUNTER (OUTPATIENT)
Dept: MAMMOGRAPHY | Facility: CLINIC | Age: 70
Discharge: HOME | End: 2025-02-14
Payer: MEDICARE

## 2025-02-14 ENCOUNTER — HOSPITAL ENCOUNTER (OUTPATIENT)
Dept: ULTRASOUND IMAGING | Facility: CLINIC | Age: 70
Discharge: HOME | End: 2025-02-14
Payer: MEDICARE

## 2025-02-14 VITALS — SYSTOLIC BLOOD PRESSURE: 108 MMHG | HEART RATE: 57 BPM | DIASTOLIC BLOOD PRESSURE: 75 MMHG

## 2025-02-14 DIAGNOSIS — R92.8 ABNORMAL MAMMOGRAM: ICD-10-CM

## 2025-02-14 DIAGNOSIS — R92.8 ABNORMAL MRI, BREAST: ICD-10-CM

## 2025-02-14 PROCEDURE — 88341 IMHCHEM/IMCYTCHM EA ADD ANTB: CPT | Performed by: PATHOLOGY

## 2025-02-14 PROCEDURE — 19084 BX BREAST ADD LESION US IMAG: CPT

## 2025-02-14 PROCEDURE — 76942 ECHO GUIDE FOR BIOPSY: CPT

## 2025-02-14 PROCEDURE — 88305 TISSUE EXAM BY PATHOLOGIST: CPT | Performed by: PATHOLOGY

## 2025-02-14 PROCEDURE — 38505 NEEDLE BIOPSY LYMPH NODES: CPT

## 2025-02-14 PROCEDURE — 88342 IMHCHEM/IMCYTCHM 1ST ANTB: CPT | Performed by: PATHOLOGY

## 2025-02-14 PROCEDURE — A4648 IMPLANTABLE TISSUE MARKER: HCPCS

## 2025-02-14 PROCEDURE — 19083 BX BREAST 1ST LESION US IMAG: CPT

## 2025-02-14 RX ORDER — LIDOCAINE HYDROCHLORIDE 10 MG/ML
5 INJECTION, SOLUTION EPIDURAL; INFILTRATION; INTRACAUDAL; PERINEURAL ONCE
Status: COMPLETED | OUTPATIENT
Start: 2025-02-14 | End: 2025-02-14

## 2025-02-14 RX ADMIN — LIDOCAINE HYDROCHLORIDE 5 ML: 10 INJECTION, SOLUTION EPIDURAL; INFILTRATION; INTRACAUDAL; PERINEURAL at 13:53

## 2025-02-14 RX ADMIN — LIDOCAINE HYDROCHLORIDE 5 ML: 10 INJECTION, SOLUTION EPIDURAL; INFILTRATION; INTRACAUDAL; PERINEURAL at 13:35

## 2025-02-14 RX ADMIN — LIDOCAINE HYDROCHLORIDE 5 ML: 10 INJECTION, SOLUTION EPIDURAL; INFILTRATION; INTRACAUDAL; PERINEURAL at 14:00

## 2025-02-14 RX ADMIN — LIDOCAINE HYDROCHLORIDE 5 ML: 10 INJECTION, SOLUTION EPIDURAL; INFILTRATION; INTRACAUDAL; PERINEURAL at 13:41

## 2025-02-14 NOTE — PROGRESS NOTES
Procedure type: SITE # 1    __x___ultrasound guided _____stereotactic    Breast:    ___x__Left _____Right    Location: 11 o'clock Periareolar    Needle: 14G    # of passes: 3    Clip: Butterfly      Procedure type: SITE # 2    ___x__ultrasound guided _____stereotactic    Breast:    __x___Left _____Right    Location: Left Axilla    Needle: 14G    # of passes: 3    Clip: Open Coil      Procedure type: SITE # 3    ___x__ultrasound guided _____stereotactic    Breast:    _____Left ___x__Right    Location: 9 o'clock Retro    Needle: 14G    # of passes: 3    Clip: Butterfly      Procedure type: SITE # 4    __x___ultrasound guided _____stereotactic    Breast:    _____Left __x___Right    Location: 10 o'clock periareolar    Needle: 14G    # of passes: 3    Clip: Open Coil      Performed by: Dr. Flores    Pressure held for 5 minutes on each side by: Suyapa Martinez Strips:    ___X__yes _____no    Band aid:    __X___yes_____no    Tolerated procedure:    __X___yes _____no

## 2025-02-17 NOTE — PROGRESS NOTES
Post procedure call completed    Bleeding: _____yes __X___no    Pain: _____yes ___X___no    Redness/Swelling: ______yes ___X___no    Band aid removed: _____yes ___X__no (discussed removing when she showers)    Steri-Strips intact: ___X___yes _____no (discussed with patient to remove steri strips on 2/19/2025 if they have not come off on their own)    Pt with no questions at this time, adv will call when results available, adv to call with any questions or concerns, has name/# for contact

## 2025-02-18 ENCOUNTER — TELEPHONE (OUTPATIENT)
Age: 70
End: 2025-02-18

## 2025-02-18 NOTE — TELEPHONE ENCOUNTER
Pt is requesting a call back ASAP to discuss the results of her recent biopsies. She would like a call back today and stated she can't wait any longer to discuss. Pt would like a call back at [phone].

## 2025-02-18 NOTE — TELEPHONE ENCOUNTER
Received a phone call from patient.  Patient was very upset during call.  Patient stated that results are in from breast biopsy and would like to know the results.  Informed patient that tissue pathology is still in process and results have not been released.  Patient became angry and began yelling over the phone, insisting that someone needs to call radiology to get the results.  Informed patient that the offices were closed now and that I will make office aware so they can call first thing in the morning.  Patient disconnected call.

## 2025-02-19 ENCOUNTER — HOSPITAL ENCOUNTER (OUTPATIENT)
Dept: MAMMOGRAPHY | Facility: CLINIC | Age: 70
Discharge: HOME | End: 2025-02-19
Payer: MEDICARE

## 2025-02-19 VITALS — SYSTOLIC BLOOD PRESSURE: 114 MMHG | HEART RATE: 62 BPM | DIASTOLIC BLOOD PRESSURE: 66 MMHG

## 2025-02-19 DIAGNOSIS — R92.8 ABNORMAL MRI, BREAST: ICD-10-CM

## 2025-02-19 DIAGNOSIS — R92.8 ABNORMAL MAMMOGRAM: ICD-10-CM

## 2025-02-19 PROCEDURE — 88360 TUMOR IMMUNOHISTOCHEM/MANUAL: CPT | Performed by: PATHOLOGY

## 2025-02-19 PROCEDURE — 88305 TISSUE EXAM BY PATHOLOGIST: CPT | Performed by: PATHOLOGY

## 2025-02-19 PROCEDURE — 19081 BX BREAST 1ST LESION STRTCTC: CPT

## 2025-02-19 PROCEDURE — A4648 IMPLANTABLE TISSUE MARKER: HCPCS

## 2025-02-19 PROCEDURE — 19082 BX BREAST ADD LESION STRTCTC: CPT

## 2025-02-19 PROCEDURE — 88342 IMHCHEM/IMCYTCHM 1ST ANTB: CPT | Performed by: PATHOLOGY

## 2025-02-19 PROCEDURE — 88341 IMHCHEM/IMCYTCHM EA ADD ANTB: CPT | Performed by: PATHOLOGY

## 2025-02-19 RX ORDER — LIDOCAINE HYDROCHLORIDE 10 MG/ML
5 INJECTION, SOLUTION EPIDURAL; INFILTRATION; INTRACAUDAL; PERINEURAL ONCE
Status: COMPLETED | OUTPATIENT
Start: 2025-02-19 | End: 2025-02-19

## 2025-02-19 RX ORDER — LIDOCAINE HYDROCHLORIDE AND EPINEPHRINE BITARTRATE 20; .01 MG/ML; MG/ML
10 INJECTION, SOLUTION SUBCUTANEOUS ONCE
Status: COMPLETED | OUTPATIENT
Start: 2025-02-19 | End: 2025-02-19

## 2025-02-19 RX ADMIN — LIDOCAINE HYDROCHLORIDE,EPINEPHRINE BITARTRATE 10 ML: 20; .01 INJECTION, SOLUTION INFILTRATION; PERINEURAL at 09:05

## 2025-02-19 RX ADMIN — LIDOCAINE HYDROCHLORIDE 5 ML: 10 INJECTION, SOLUTION EPIDURAL; INFILTRATION; INTRACAUDAL; PERINEURAL at 08:40

## 2025-02-19 RX ADMIN — LIDOCAINE HYDROCHLORIDE,EPINEPHRINE BITARTRATE 10 ML: 20; .01 INJECTION, SOLUTION INFILTRATION; PERINEURAL at 08:40

## 2025-02-19 RX ADMIN — LIDOCAINE HYDROCHLORIDE 5 ML: 10 INJECTION, SOLUTION EPIDURAL; INFILTRATION; INTRACAUDAL; PERINEURAL at 09:05

## 2025-02-19 NOTE — PROGRESS NOTES
Ice packs given:    __x___yes _____no        Discharge instructions given to patient:    __x___yes _____no    Discharged via:    ___x__ambulatory    _____wheelchair    _____stretcher    Stable on discharge:    __x___yes ____no    Dressings clean, dry and intact.

## 2025-02-19 NOTE — PROGRESS NOTES
Procedure type:    _____ultrasound guided __x___stereotactic _____ MRI guided    Breast:    _____Left __x___Right    Location: Right Breast 100    Needle:10G    # of passes:6 (4 with calcs/2 cores without calcs    Clip:U Shape    Performed by:     Pressure held for 5 minutes by: Lianne Palumbo RN    Steri Strips:    _x____yes _____no    Band aid:    _x____yes_____no    Tape and guaze:    ___x__yes _____no    Tolerated procedure:    ___x__yes _____no

## 2025-02-19 NOTE — TELEPHONE ENCOUNTER
Spoke to patient and informed her that breast biopsy is still pending. I will call her as soon as the pathology report is published.

## 2025-02-19 NOTE — PROGRESS NOTES
Procedure type:    _____ultrasound guided __x___stereotactic _____ MRI guided    Breast:    _____Left ___x__Right    Location: Right Breast 1200    Needle:10G    # of passes:6 (2 cores with calcs/4 cores without calcs)    Clip:Jackelyn    Performed by: Dr. Richards    Pressure held for 5 minutes by:Lianne Burton    Steri Strips:    ___x__yes _____no    Band aid:    ___x__yes_____no    Tape and guaze:    __x___yes _____no    Tolerated procedure:    ___x__yes _____no

## 2025-02-20 ENCOUNTER — TELEPHONE (OUTPATIENT)
Dept: GENETICS | Facility: CLINIC | Age: 70
End: 2025-02-20

## 2025-02-20 ENCOUNTER — RESULTS FOLLOW-UP (OUTPATIENT)
Dept: SURGICAL ONCOLOGY | Facility: CLINIC | Age: 70
End: 2025-02-20

## 2025-02-20 NOTE — TELEPHONE ENCOUNTER
I called the patient to reschedule her genetic counseling appt because we received a message that she had a conflict. The patient had already rescheduled her appointment earlier today but I answered a few of her questions. I explained that we send out the care questionnaire for fhx and she stated that she would not be able to fill that out online. I explained that we do not have a physical copy but it is totally okay if she does not fill it out - we can just take her history at the beginning of her consult. I gave her the address and suite number for the [place] office. We look forward to meeting with her.

## 2025-02-20 NOTE — PROGRESS NOTES
Post procedure call completed 2/20/2025 at   9:44    Bleeding: _____yes __X___no    Pain: _____yes ___X___no    Redness/Swelling: ______yes ___X___no    Band aid removed: _____yes ___X__no (discussed removing when she showers)    Steri-Strips intact: ___X___yes _____no (discussed with patient to remove steri strips on .2/24/2025.. if they have not come off on their own)    Pt with no questions at this time, adv will call when results available, adv to call with any questions or concerns, has name/# for contact

## 2025-02-21 ENCOUNTER — TELEPHONE (OUTPATIENT)
Age: 70
End: 2025-02-21

## 2025-02-21 ENCOUNTER — TELEPHONE (OUTPATIENT)
Dept: SURGICAL ONCOLOGY | Facility: CLINIC | Age: 70
End: 2025-02-21

## 2025-02-21 PROCEDURE — 88342 IMHCHEM/IMCYTCHM 1ST ANTB: CPT | Performed by: PATHOLOGY

## 2025-02-21 PROCEDURE — 88305 TISSUE EXAM BY PATHOLOGIST: CPT | Performed by: PATHOLOGY

## 2025-02-21 PROCEDURE — 88341 IMHCHEM/IMCYTCHM EA ADD ANTB: CPT | Performed by: PATHOLOGY

## 2025-02-21 PROCEDURE — 88360 TUMOR IMMUNOHISTOCHEM/MANUAL: CPT | Performed by: PATHOLOGY

## 2025-02-21 NOTE — TELEPHONE ENCOUNTER
"FYI:  Pt calling woke up in mid night with c/o\"itchy\" feeling between her breasts.    Pt had bx done on 2/14 and again on 2/19.  Pt stated the incisions are fine. Its the itchiness in the middle of her chest that's bothering her.Its a little red she stated.    Advised pt to try OTC Benadryl 25mg in AM and 50 mg in PM reminding pt the medication may make her sleepy,pt understood.  Also advised she may apply some Hydrocortisone to the middle area and reminding pt to NOT place ON the incisions,pt understood.Advised pt may also try ice packs off and on for comfort.    Pt will try the advise given to her.Informed pt if finding no relief to call back also informed that should she worsen there are on call providers on the weekend to reach out to,pt understood.    Pt aslo c/o discomfort to L breast advised to try ES Tylenol q 8 for a few days,pt understood and will try.    Asked pt to send photo for further assessment pt stated she was not able to do ,does not really use her Mychart.    Pls advise  "

## 2025-02-21 NOTE — TELEPHONE ENCOUNTER
Spoke to patient regarding recent 6 breast biopsies. Surgical consult for left breast recommended for ALH spanning 4.9 cm. Right breast biopsy revealed DCIS and LCIS. Concordance report pending. Explained to the patient that we will need to get her scheduled with a surgeon. I will arrange this once her concordance report is back with radiology recommendations. She mentions that she has a rash on her breast with pruritus. Picture was sent to myself. No signs of infection. Appears to be irritation. Recommended benadryl and hydrocortisone cream. All questions were answered.

## 2025-02-24 ENCOUNTER — PATIENT OUTREACH (OUTPATIENT)
Dept: HEMATOLOGY ONCOLOGY | Facility: CLINIC | Age: 70
End: 2025-02-24

## 2025-02-24 NOTE — PROGRESS NOTES
I received a message :      Mary Park, RN  Wanda De Santiago, MIKE; Gracie Huerta MA; Shala Stahl RN  This patient is a new cancer that I scheduled for Dr Christianson on 3/6/25 at [place]. I just wanted to make you aware so that someone can reach out to her from navigation. She also needs to be set up with [facility] for this appointment. Thank you.      I did outreach Pt to go over Transportation needs but unfortunately the Pt does not qualify for [facility].   Pt at first was not receptive to alternate resources for transport but she did ask me to Email the links to her and said maybe she will call.  I did Email the links to [email or web address]    [facility]  [employer]   [facility] Anjelica Stuart

## 2025-02-24 NOTE — TELEPHONE ENCOUNTER
Patient called requesting that Gricelda Beal returns her call. Patient did not want to give further details on what the call entails. Patient just requested a call back from provider.     Please call patient,     Thanks!

## 2025-03-03 ENCOUNTER — TELEPHONE (OUTPATIENT)
Age: 70
End: 2025-03-03

## 2025-03-03 ENCOUNTER — APPOINTMENT (OUTPATIENT)
Dept: LAB | Facility: MEDICAL CENTER | Age: 70
End: 2025-03-03
Payer: MEDICARE

## 2025-03-03 ENCOUNTER — TELEPHONE (OUTPATIENT)
Dept: GENETICS | Facility: CLINIC | Age: 70
End: 2025-03-03

## 2025-03-03 ENCOUNTER — PATIENT OUTREACH (OUTPATIENT)
Dept: HEMATOLOGY ONCOLOGY | Facility: CLINIC | Age: 70
End: 2025-03-03

## 2025-03-03 DIAGNOSIS — Z80.3 FAMILY HISTORY OF BREAST CANCER: ICD-10-CM

## 2025-03-03 DIAGNOSIS — N60.99 ATYPICAL DUCTAL HYPERPLASIA OF BREAST: ICD-10-CM

## 2025-03-03 DIAGNOSIS — D05.11 DUCTAL CARCINOMA IN SITU (DCIS) OF RIGHT BREAST: Primary | ICD-10-CM

## 2025-03-03 DIAGNOSIS — R92.8 ABNORMAL MAMMOGRAM: ICD-10-CM

## 2025-03-03 DIAGNOSIS — Z80.0 FAMILY HISTORY OF PANCREATIC CANCER: ICD-10-CM

## 2025-03-03 PROCEDURE — 80048 BASIC METABOLIC PNL TOTAL CA: CPT

## 2025-03-03 PROCEDURE — 36415 COLL VENOUS BLD VENIPUNCTURE: CPT

## 2025-03-03 NOTE — TELEPHONE ENCOUNTER
Patient returning call from Shala Stahl RN. Attempted to warm transfer to Shala per her request but was unsuccessful. Informed patient Shala will contact her directly in a few minutes.

## 2025-03-03 NOTE — PROGRESS NOTES
Breast Oncology Nurse Navigator    Called patient for initial outreach from nurse navigator.  Introduced myself and my role as well as members of the navigation team.  Oncology Nurse Navigator will follow patient until they are seen by surgical oncology, after which the patient navigator initiate outreach and follow the patient to survivorship.      Referral received from [facility]  Breast cancer diagnosis was made after pt had a right stereotactic breast biopsy on 2/19/2025, pathology was positive for right DCIS, ER + 95%, UT+ 80% Grade 2  Patient states some understanding/awareness of diagnosis    Does patient have a PCP?  yes  Zhou Barahona.  If no, referral placed to family medicine.  Any other issues/diagnoses?  Yes fibromyalgia  Any implanted devices?  no  Is patient a current smoker: no    Confirmed upcoming appointment with Dr. Christianson, including date, time and location.  Patient will be accompanied by self  Provided brief explanation of what to expect at this visit.  Patient has transportation to appointments.    Patient lives alone  Support system includes very limited  Referral placed to oncology social worker: yes    Cancer family history includes: mother had pancreatic  Referral to oncology genetics: yes 3/3/2025  Does patient have a prior cancer diagnosis?  no  Any previous radiation treatment?  N/A If yes, what body part? N/A  What facility? N/A  Is patient pre/post menopausal?  post menopausal   Is patient taking hormone replacement therapy? Estrogen vaginal cream If so, how long?  1 x week for several years  Birth control?  N/A  Fertility issues? N/A    Discussed the Cancer Support Community and some of the programs and services offered  Discussed Breast Cancer Support group that meets monthly at [facility].  Information sent via [employer].    Patient has my contact information and knows she can reach out with questions.  Office hours provided.  Patient provided with the phone number for  [facility]-[phone].  General assessment completed.  Patient does have MyChart set up  [employer] message sent with our team's contact information.

## 2025-03-03 NOTE — TELEPHONE ENCOUNTER
I introduced myself to Jessica and let her know that her breast surgeon reached out to the cancer risk and genetics program on her behalf to begin STAT genetic testing process.    I reviewed the following with Jessica:    While the majority of cancer occurs by chance, approximately 5-10% of breast cancer has an underlying genetic cause. Genetic testing is available which can determine if there is an underlying genetic cause to your cancer. Understanding if there is an underlying genetic cause can:  Provide your surgeon with additional information to help with surgical decisions (i.e. lumpectomy vs mastectomy), treatment decisions and eligibility for clinical trials.    It can determine if you have an increased risk for any additional cancers.  Help family members understand their cancer risk.       We work closely with the [facility] breast surgeons and are reaching out to see if you have interest in genetic testing. This test is not a requirement but can take 5-10 days to complete so we would like to start the process as soon as possible so the results are ready for your appointment with your surgeon.       If interested, family history will be collected to initiate STAT genetic testing process.        Patient elected to pursue testing     Diagnosis Details:  Ductal Carcinoma In situ  Right  Hormone receptors pending    Personal History:  Do you have a personal history of any other cancer? No  If yes type/age of diagnosis: Pre-Cancer both Breasts    Family history:   Do you have Ashkenazi Scientologist ancestry? No  If yes, maternal, paternal, or both?    Do you have any children? Yes  How many sons? 2  How many daughters? 0  Do any of your children have a history of cancer? No  If yes type/age of diagnosis:     Do you have any brothers or sisters? Yes  How many brothers? 0  How many sisters? 1  Are they from the same parents? Yes  If no how maternal/paternal half-siblings:  Do any of your brothers or sisters have a history of  cancer? No  If yes who and the type/age of diagnosis:           Do you have nieces or nephews? Yes  Do any of them have a history of cancer? No  If yes type/age of diagnosis:    Does your mother have a history of cancer? Yes  If yes, cancer type and age of diagnosis: Pancreatic Cancer age 68  Is your mother still living? No  Age/Age of death: 68    Thinking about your mother's family (aunts, uncles, cousins, grandparents) is there anyone with a history of cancer? Yes  If yes, list relationship, cancer type and age of diagnosis:  Maternal Aunt - Breast Cancer ()  Maternal Aunt - Breast Cancer ()    Does your father have a history of cancer? No  If yes, cancer type and age of diagnosis: Possible Intestinal Cancer  Is your father still living? No  Age/age of death: 92    Thinking about your father's family (aunts, uncles, cousins, grandparents) is there anyone with a history of cancer? Yes  If yes, list relationship, cancer type and age of diagnosis:   PGF - Skin Cancer (Head)  Paternal Uncle - Lung Cancer age 80s ()     Types of Results  Positive Result - May explain personal diagnosis/family history. Can give surgeon information on treatment plan, inform future screening/management or tell a person about other possible risks. Positive results can initiate testing for other family members who may be at risk (children, siblings, etc)  Negative Result - Does not give an explanation. Surgical/treatment plan will be based on clinical presentation and will be part of discussion with surgeon. Negative result cannot be passed down to children, but they are still at elevated risk.  Uncertain Result - Common, but treated like a negative result clinically. 90% are downgraded over time.     [email or web address]'s billing policy   Most individuals pay <$100 for hereditary cancer genetic testing. If insurance covers the cost of the testing, individuals may still pay out of pocket secondary to co-pays,  co-insurance, or deductibles. If the cost of the testing exceeds $100, the lab will reach out to the patient via phone or e-mail. The patient will then have the option to proceed with the testing, cancel the testing, or elect the self-pay option of $250. Jessica verbalized understanding.    We have genetic counselors available, if you have any additional questions or would like to speak with them we can schedule you a 15 minute appointment.      Plan:  An order was placed and the patient was instructed to go to a [facility] lab for a blood collection    Genetic Testing Preformed: [employer]Â¢ Interactive BRCAplus STAT Panel (13 genes): FR[place], BARD1, BRCA1, BRCA2, CDH1, CHEK2, NF1, PALB2, PTEN, RAD51C, RAD51D, STK11, TP53 with reflex to [employer]Â¢ Interactive [employer]: Cancer+RNAinsight (59 genes): APC, FRANCE, AXIN2, BAP1, BARD1, BMPR1A, BRCA1, BRCA2, BRIP1, CDH1, CDK4, CDKN1B, CDKN2A, CHEK2, CTNNA1, DICER1, EGLN1, EPCAM, FH, FLCN, GREM1, HOXB13, KIF1B, KIT, MAX, MEN1, MET, MITF, MLH1, MSH2, MSH3, MSH6, MUTYH, NF1, NTHL1, PALB2, PDGFRA PMS2, POLD1, POLE, POT1, PTEN, RAD51C, RAD51D, RB1, RET, SDHA, SDHAF2, SDHB, SDHC, SDHD, SMAD4, SMARCA4, STK11, FXOY761, TP53, TSC1, TSC2, VHL     Initial results will take approximately 5-12 days to return     Additional results may take up to 2-3 weeks to complete once test is started    Result Call Information:    Patient agreeable to phone call only if results are positive/complex    In the event that we need to reach Jessica via telephone:  I confirmed the patient's mobile number on file as the best number to call with results  I confirmed with the patient that we can leave a voicemail on the provided numbers    Patient does not have any further questions, declined meeting with genetic counselor    When your results are ready, your results will be available in your my chart. If you would like, someone from the genetics team will call you with any type of result- positive, negative or uncertain. Otherwise our team will only  call to review significant or complex result. We will make your care team aware of your result.

## 2025-03-04 ENCOUNTER — PATIENT OUTREACH (OUTPATIENT)
Dept: CASE MANAGEMENT | Facility: OTHER | Age: 70
End: 2025-03-04

## 2025-03-04 LAB
ANION GAP SERPL CALCULATED.3IONS-SCNC: 15 MMOL/L (ref 4–13)
BUN SERPL-MCNC: 19 MG/DL (ref 5–25)
CALCIUM SERPL-MCNC: 10 MG/DL (ref 8.4–10.2)
CHLORIDE SERPL-SCNC: 102 MMOL/L (ref 96–108)
CO2 SERPL-SCNC: 24 MMOL/L (ref 21–32)
CREAT SERPL-MCNC: 0.81 MG/DL (ref 0.6–1.3)
GFR SERPL CREATININE-BSD FRML MDRD: 74 ML/MIN/1.73SQ M
GLUCOSE SERPL-MCNC: 111 MG/DL (ref 65–140)
POTASSIUM SERPL-SCNC: 3.8 MMOL/L (ref 3.5–5.3)
SODIUM SERPL-SCNC: 141 MMOL/L (ref 135–147)

## 2025-03-04 NOTE — PROGRESS NOTES
[facility] received referral for patient. Patient has consult with Dr. Christianson set for 3/10/25, [facility] will follow and outreach after consult is completed.

## 2025-03-05 ENCOUNTER — NURSE TRIAGE (OUTPATIENT)
Age: 70
End: 2025-03-05

## 2025-03-05 ENCOUNTER — TELEPHONE (OUTPATIENT)
Age: 70
End: 2025-03-05

## 2025-03-05 DIAGNOSIS — B37.9 YEAST INFECTION: Primary | ICD-10-CM

## 2025-03-05 DIAGNOSIS — R39.9 UTI SYMPTOMS: ICD-10-CM

## 2025-03-05 RX ORDER — FLUCONAZOLE 150 MG/1
150 TABLET ORAL ONCE
Qty: 1 TABLET | Refills: 0 | Status: SHIPPED | OUTPATIENT
Start: 2025-03-05 | End: 2025-03-05

## 2025-03-05 RX ORDER — NITROFURANTOIN 25; 75 MG/1; MG/1
100 CAPSULE ORAL 2 TIMES DAILY
Qty: 14 CAPSULE | Refills: 0 | Status: SHIPPED | OUTPATIENT
Start: 2025-03-05

## 2025-03-05 NOTE — TELEPHONE ENCOUNTER
Please send medication        REASON FOR CONVERSATION: Possible UTI     SYMPTOMS: pt calling in saying she has urinary burning, itching ,and a hard time urinating.      OTHER: pt saying that she hasnt drank much water. Pt took one dose of  Macrobid pill from 4/4/23.

## 2025-03-05 NOTE — TELEPHONE ENCOUNTER
Patient called asking for Dr. Franklin's opinion.  She has had a lot of breast imaging and was told she has breast cancer.  She would like him to take a look at her records and advise her on if they are doing what is best for her.

## 2025-03-05 NOTE — TELEPHONE ENCOUNTER
Spoke to Jessica per Dr. Franklin he agrees with the specialist Pt. Notified medication for UTI has been sent in and Diflucan

## 2025-03-05 NOTE — TELEPHONE ENCOUNTER
"FOLLOW UP: pt is advised to be seen in the office    REASON FOR CONVERSATION: Possible UTI    SYMPTOMS: pt calling in saying she has urinary burning, itching ,and a hard time urinating.     OTHER: pt saying that she hasnt drank much water. Pt took one dose of  Macrobid pill from 4/4/23. Contacted Tamiko to assist with scheduling, no sooner appts available. As per Tamiko,  she will be contacting the patient directly after speaking to Dr Franklin, pt was notified, and verbalized understanding.     DISPOSITION: See Today in Office            Reason for Disposition   Painful urination AND EITHER frequency or urgency    Answer Assessment - Initial Assessment Questions  1. SEVERITY: \"How bad is the pain?\"  (e.g., Scale 1-10; mild, moderate, or severe)      Burning with urination 4/10.   2. FREQUENCY: \"How many times have you had painful urination today?\"       2 times   3. PATTERN: \"Is pain present every time you urinate or just sometimes?\"       Sometimes   4. ONSET: \"When did the painful urination start?\"       Yesterday.   5. FEVER: \"Do you have a fever?\" If Yes, ask: \"What is your temperature, how was it measured, and when did it start?\"      Pt denies   6. PAST UTI: \"Have you had a urine infection before?\" If Yes, ask: \"When was the last time?\" and \"What happened that time?\"       Pt has had a past uti  7. CAUSE: \"What do you think is causing the painful urination?\"  (e.g., UTI, scratch, Herpes sore)      Possible UTI   8. OTHER SYMPTOMS: \"Do you have any other symptoms?\" (e.g., blood in urine, flank pain, genital sores, urgency, vaginal discharge)      Pt denies blood in urine, flank pain, genital sores, urgency, vaginal discharge  9. PREGNANCY: \"Is there any chance you are pregnant?\" \"When was your last menstrual period?\"      Hysterectomy    Protocols used: Urination Pain - Female-Adult-[place]    "

## 2025-03-10 ENCOUNTER — OFFICE VISIT (OUTPATIENT)
Dept: SURGICAL ONCOLOGY | Facility: CLINIC | Age: 70
End: 2025-03-10
Payer: MEDICARE

## 2025-03-10 VITALS
HEART RATE: 83 BPM | DIASTOLIC BLOOD PRESSURE: 72 MMHG | SYSTOLIC BLOOD PRESSURE: 124 MMHG | OXYGEN SATURATION: 96 % | WEIGHT: 166 LBS | HEIGHT: 66 IN | TEMPERATURE: 98.1 F | RESPIRATION RATE: 16 BRPM | BODY MASS INDEX: 26.68 KG/M2

## 2025-03-10 DIAGNOSIS — R92.8 ABNORMAL MRI, BREAST: ICD-10-CM

## 2025-03-10 DIAGNOSIS — E11.69 CONTROLLED TYPE 2 DIABETES MELLITUS WITH OTHER SPECIFIED COMPLICATION, WITHOUT LONG-TERM CURRENT USE OF INSULIN (HCC): ICD-10-CM

## 2025-03-10 DIAGNOSIS — N60.92 ATYPICAL LOBULAR HYPERPLASIA (ALH) OF LEFT BREAST: ICD-10-CM

## 2025-03-10 DIAGNOSIS — D05.11 DUCTAL CARCINOMA IN SITU (DCIS) OF RIGHT BREAST: Primary | ICD-10-CM

## 2025-03-10 PROCEDURE — 99215 OFFICE O/P EST HI 40 MIN: CPT | Performed by: SURGERY

## 2025-03-10 NOTE — PROGRESS NOTES
Name: Jessica Cartagena      : 1955      MRN: [id number]  Encounter Provider: Garima Christianson MD  Encounter Date: 3/10/2025   Encounter department: [facility] [place]  :  Assessment & Plan  Abnormal MRI, breast    Orders:    MRI breast biopsy left (all inclusive); Future    Controlled type 2 diabetes mellitus with other specified complication, without long-term current use of insulin (HCC)    Lab Results   Component Value Date    HGBA1C 5.9 (H) 2024            Ductal carcinoma in situ (DCIS) of right breast         Atypical lobular hyperplasia (ALH) of left breast         69-year-old female who presents with DCIS of the right breast.  The extent is roughly 4.4 cm.  She has standard clips in place.  She could have a bracketed lumpectomy.  She will need a 2 site DEBRA insertion for this.  She also has an area of atypical lobular hyperplasia in the left breast 11:00 detected by enhancement on breast MRI.  I therefore counseled her on surgical excision of this as well.  In addition there is a bilobed mass in the left breast 9:00 retroareolar axis that has not been evaluated.  This was seen on MRI only.  I therefore am recommending an MRI guided biopsy of the left breast.  She did submit a sample for genetic testing which is currently pending.  If any actionable mutations are detected, she could opt for mastectomy rather than breast conservation.  On further review of her chart and conversation with radiology, her last bilateral mammogram was 2024.  The only mammography she has had in the near term has been postbiopsy mammograms.  We can plan for a bilateral diagnostic mammogram to be done at the time of the bilateral Debra insertions if we are proceeding in this fashion.  All of her questions were answered.  I will call her with the results of the MRI guided biopsy.  Further recommendations will be made at that time.        History of Present Illness   Jessica Cartagena is  a 69 y.o. year old female who presents with DCIS of the right breast.  She also had an area of atypical lobular hyperplasia on the left side and an area of non-mass enhancement.  She reportedly has a history of both atypical ductal and lobular hyperplasia over a decade ago and has had bilateral excisional biopsies.  She reports family history of breast cancer.  She submitted a sample for genetic testing which is currently pending.    Oncology History   Cancer Staging   Ductal carcinoma in situ (DCIS) of right breast  Staging form: Breast, AJCC 8th Edition  - Clinical stage from 2/19/2025: Stage 0 (cTis (DCIS), cN0, cM0, ER+, IA+, HER2: Not Assessed) - Signed by Garima Christianson MD on 3/10/2025  Stage prefix: Initial diagnosis  Method of lymph node assessment: Clinical  Nuclear grade: G2  Oncology History   Ductal carcinoma in situ (DCIS) of right breast   2/14/2025 Biopsy    B/L US-guided Bx    A. Left 11:00 periareolar  Breast tissue with dense stromal fibrosis and focal ALH    B. Left axillary lymph node  Benign fibroadipose tissue with minute focus of lymphocytes; definitive lymph node tissue not identified. Multiple additional levels were evaluated.    C. Right 9:00 retroareolar  Breast tissue with fibrocystic changes.    D. Right 10:00 periareolar  Breast tissue with fibrocystic changes and microcalcifications.     2/19/2025 Biopsy    Right stereo bx, multi site:    A. 1:00, With Calcs  DCIS & LCIS adjacent to and involving a sclerosing lesion  Grade 2  ER 95, IA 80    B. 1:00, W/O Calcs  DCIS & LCIS adjacent to and involving a sclerosing lesion    C. 12:00, With Calcs  Fibroadenoma and sclerosing changes; microcalcifications present in fibroadenoma     D. 12:00 W/O Calcs  Benign breast parenchyma, without atypia, including apocrine metaplasia, sclerosing changes, duct dilatation. No evidence of malignancy.       2/19/2025 -  Cancer Staged    Staging form: Breast, AJCC 8th Edition  - Clinical stage from 2/19/2025:  Stage 0 (cTis (DCIS), cN0, cM0, ER+, NV+, HER2: Not Assessed) - Signed by Garima Christianson MD on 3/10/2025  Stage prefix: Initial diagnosis  Method of lymph node assessment: Clinical  Nuclear grade: G2       3/3/2025 Initial Diagnosis    Ductal carcinoma in situ (DCIS) of right breast        Review of Systems   Constitutional: Negative.  Negative for appetite change, fever and unexpected weight change.   HENT: Negative.  Negative for trouble swallowing.    Eyes: Negative.    Respiratory: Negative.  Negative for cough and shortness of breath.    Cardiovascular: Negative.  Negative for chest pain.   Gastrointestinal: Negative.  Negative for abdominal pain, nausea and vomiting.   Endocrine: Negative.    Genitourinary: Negative.  Negative for dysuria.   Musculoskeletal: Negative.  Negative for arthralgias and myalgias.   Skin: Negative.    Allergic/Immunologic: Negative.    Neurological: Negative.  Negative for headaches.   Hematological: Negative.  Negative for adenopathy. Does not bruise/bleed easily.   Psychiatric/Behavioral: Negative.      A complete review of systems is negative other than that noted above in the HPI.    Medical History Reviewed by provider this encounter:  Tobacco  Allergies  Meds  Problems  Med Hx  Surg Hx  Fam Hx     .  Current Outpatient Medications on File Prior to Visit   Medication Sig Dispense Refill    benzonatate (TESSALON PERLES) 100 mg capsule TAKE 2 CAPSULES BY MOUTH EVERY 8 HOURS AS NEEDED FOR COUGH  3    Calcium 500-125 MG-UNIT TABS 600 mg      chlorthalidone 25 mg tablet Take 12.5 mg by mouth daily      Cholecalciferol (VITAMIN D3) 1000 units CAPS Take by mouth      [employer] VITAMIN B-12 1000 MCG tablet Take 1,200 mcg by mouth daily  3    estradiol (ESTRACE) 0.1 mg/g vaginal cream INSERT 1 GRAM VAGINALLY  WEEKLY 42.5 g 3    famotidine (Pepcid) 20 mg tablet Every 12 hours      fluticasone (FLONASE) 50 mcg/act nasal spray 2 SPRAYS INTO EACH NOSTRIL DAILY.      multivitamin (THERAGRAN)  "TABS Take 1 tablet by mouth daily      nitrofurantoin (MACROBID) 100 mg capsule Take 1 capsule (100 mg total) by mouth 2 (two) times a day 14 capsule 0    Omega-3 Fatty Acids (FISH OIL PO) Take 2,400 mcg by mouth 3 (three) times a day      ONETOUCH DELICA LANCETS 33G MISC TEST 3 X DAILY  3    PROAIR  (90 Base) MCG/ACT inhaler 1 puff every 6 (six) hours as needed  2    Sodium Hyaluronate, oral, (HYALURONIC ACID PO) Take by mouth      vitamin E, tocopherol, 400 units capsule Take 400 Units by mouth daily      ammonium lactate (LAC-HYDRIN) 12 % lotion Apply topically      betamethasone valerate (VALISONE) 0.1 % cream APPLY TWICE DAILY 45 g 2    CARAFATE 1 GM/10ML suspension 10 MILLILITER BEFORE MEALS AND AT BEDTIME (Patient not taking: Reported on 11/26/2024)  3     No current facility-administered medications on file prior to visit.      Social History     Tobacco Use    Smoking status: Former    Smokeless tobacco: Never   Vaping Use    Vaping status: Never Used   Substance and Sexual Activity    Alcohol use: Not Currently    Drug use: Yes     Types: Marijuana     Comment: medical    Sexual activity: Not Currently     Comment: ANEESH          Objective   /72 (BP Location: Left arm, Patient Position: Sitting, Cuff Size: Large)   Pulse 83   Temp 98.1 °F (36.7 °C) (Temporal)   Resp 16   Ht 5' 6\" (1.676 m)   Wt 75.3 kg (166 lb)   SpO2 96%   BMI 26.79 kg/m²     Pain Screening:  Pain Score:   1  ECOG    Physical Exam  Constitutional:       General: She is not in acute distress.     Appearance: Normal appearance. She is well-developed.   HENT:      Head: Normocephalic and atraumatic.   Cardiovascular:      Heart sounds: Normal heart sounds.   Pulmonary:      Breath sounds: Normal breath sounds.   Chest:   Breasts:     Right: No swelling, bleeding, inverted nipple, mass, nipple discharge, skin change or tenderness.      Left: No swelling, bleeding, inverted nipple, mass, nipple discharge, skin change or " tenderness.   Abdominal:      Palpations: Abdomen is soft.   Musculoskeletal:      Right lower leg: No edema.      Left lower leg: No edema.   Lymphadenopathy:      Upper Body:      Right upper body: No supraclavicular, axillary or pectoral adenopathy.      Left upper body: No supraclavicular, axillary or pectoral adenopathy.   Neurological:      Mental Status: She is alert and oriented to person, place, and time.   Psychiatric:         Mood and Affect: Mood normal.          Labs: I have reviewed pertinent labs.     Appointment on 03/03/2025   Component Date Value Ref Range Status    Sodium 03/03/2025 141  135 - 147 mmol/L Final    Potassium 03/03/2025 3.8  3.5 - 5.3 mmol/L Final    Chloride 03/03/2025 102  96 - 108 mmol/L Final    CO2 03/03/2025 24  21 - 32 mmol/L Final    ANION GAP 03/03/2025 15 (H)  4 - 13 mmol/L Final    BUN 03/03/2025 19  5 - 25 mg/dL Final    Creatinine 03/03/2025 0.81  0.60 - 1.30 mg/dL Final    Standardized to IDMS reference method    Glucose 03/03/2025 111  65 - 140 mg/dL Final    If the patient is fasting, the ADA then defines impaired fasting glucose as > 100 mg/dL and diabetes as > or equal to 123 mg/dL.    Calcium 03/03/2025 10.0  8.4 - 10.2 mg/dL Final    eGFR 03/03/2025 74  ml/min/1.73sq m Final   Hospital Outpatient Visit on 02/19/2025   Component Date Value Ref Range Status    Case Report 02/19/2025    Final                    Value:Surgical Pathology Report                         Case: [id number]                                  Authorizing Provider:  Gricelda Beal,   Collected:           02/19/2025 0856                                     CRANTHONY                                                                         Ordering Location:     [facility] Received:            02/19/2025 1629                                     Center                                                                       Pathologist:           Raquel Pinon MD                                                                  Specimens:   A) - Breast, Right, Stereo specimen 1 @1:00- 4 cores with calcs                                     B) - Breast, Right, Stereo specimen 2 @1:00- 2 cores without calcs                                  C) - Breast, Right, Stereo specimen 3 @12:00- 2 cores with calcs                                    D) - Breast, Right, Stereo specimen 4 @12:00- 4 cores without calcs                        Final Diagnosis 02/19/2025    Final                    Value:A. Breast, Right, Stereo specimen 1 @1:00- 4 cores with calcs:  - Ductal carcinoma in situ (DCIS) and lobular carcinoma in situ (LCIS) adjacent to and involving an sclerosing lesion  - No invasive carcinoma is seen.     B. Breast, Right, Stereo specimen 2 @1:00- 2 cores without calcs:  - Ductal carcinoma in situ and lobular carcinoma in situ adjacent to and involving an sclerosing lesion  - No invasive carcinoma is seen.     C. Breast, Right, Stereo specimen 3 @12:00- 2 cores with calcs:  - Fribroadenoma and sclerosing changes.  - Microcalcifications are present in fibroadenoma.      D. Breast, Right, Stereo specimen 4 @12:00- 4 cores without calcs:  - Benign breast parenchyma, without atypia, including apocrine metaplasia, sclerosing changes, duct dilatation.  - Microcalcifications are present in sclerosing changes  - No evidence of malignancy.     Comment: Immunohistochemistry for calponin (A1, A2, B1, C1, D1), p63(A2), SMMHC (A1, B1) confirm the presence of myoepithelial cells; E                           cadherin (A2) and p120 (A2) confirms both DCIS and LCIS; CK5/6 (A2) shows loss of stain in DCIS and LCIS component.    Estrogen, progesterone studies pending, to be described in a separate receptor report.     Intradepartmental consultation is in agreement.    [facility] breast [facility] nurse navigator team is notified of the findings via [employer] Secure Chat on 2/21/2025       This is an appended report. These results have  been appended to a previously preliminary verified report.    Note 02/19/2025    Final                    Value:Interpretation performed at [facility], [address] [facility] [place] [address]        This is an appended report. These results have been appended to a previously preliminary verified report.    Additional Information 02/19/2025    Final                    Value:All reported additional testing was performed with appropriately reactive controls.  These tests were developed and their performance characteristics determined by [facility] or appropriate performing facility, though some tests may be performed on tissues which have not been validated for performance characteristics (such as staining performed on alcohol exposed cell blocks and decalcified tissues).  Results should be interpreted with caution and in the context of the patients’ clinical condition. These tests may not be cleared or approved by the U.S. Food and Drug Administration, though the FDA has determined that such clearance or approval is not necessary. These tests are used for clinical purposes and they should not be regarded as investigational or for research. This laboratory has been approved by CLIA 88, designated as a high-complexity laboratory and is qualified to perform these tests.  .      Synoptic Checklist 02/19/2025    Corrected                    Value:DCIS OF THE BREAST: Biopsy                            DCIS OF THE BREAST: BIOPSY - A, B                            Protocol posted: 9/20/2023                                                        SPECIMEN                               Procedure:    Needle biopsy                                Specimen Laterality:    Right                                                         TUMOR                               Tumor Site:    Clock position                                :    1 o'clock                              Histologic Type:    Ductal carcinoma in situ  (DCIS)                              Architectural Patterns:    Cribriform                              Nuclear Grade:    Grade II (intermediate)                              Necrosis:    Not identified                              Microcalcifications:    Present in DCIS                                                         ADDITIONAL FINDINGS                             Additional Findings:    lobular carcinoma in situ                             Breast Biomarker Reporting Template                            (Added in Addendum) BREAST BIOMARKER REPORTING TEMPLATE - A                            Protocol posted: 12/13/2023                                                           Test(s) Performed:                                     Estrogen Receptor (ER) Status:    Positive (greater than 10% of cells demonstrate nuclear positivity)                                    Percentage of Cells with Nuclear Positivity:    95 %                                   Average Intensity of Staining:    Strong                                  Test Type:    Food and Drug Administration (FDA) cleared (test / vendor): CONFIRM anti-Estrogen Receptor (ER)/[place] [facility]                                  Primary Antibody:    SP1                                Test(s) Performed:                                     Progesterone Receptor (PgR) Status:    Positive                                    Percentage of Cells with Nuclear Positivity:    80 %                                   Average Intensity of Staining:    Moderate                                  Test Type:    Food and Drug Administration (FDA) cleared (test / vendor): CONFIRM anti-Progesterone Receptor ([place])/[facility]                                  Primary Antibody:    1E2                                Cold Ischemia and Fixation Times:    Meet requirements specified in latest version of the ASCO / CAP Guidelines                                Testing Performed on Block  "Number(s):    A2       Gross Description 02/19/2025    Final                    Value:A. The specimen is received in formalin, labeled with the patient's name and hospital number, and is designated \" right breast stereo specimen 1 at 1:00-4 cores with calcs\".  The specimen consists of 4 fibromembranous, friable cores measuring less than 0.1-0.5 cm in diameter and 1.4-2.3 cm in length.  Entirely submitted. Two cassettes.  Between sponges.  B. The specimen is received in formalin, labeled with the patient's name and hospital number, and is designated \" right breast stereo specimen 2 at 1:00-2 cores without calcs\".  The specimen consists 2 fibromembranous, friable cores measuring less than 0.1-0.5 cm in diameter tan 1.8-2.1 cm in length.  Entirely submitted. One cassette.  Between sponges  C. The specimen is received in formalin, labeled with the patient's name and hospital number, and is designated \" right breast stereo specimen 3 at 12:00-2 cores with calcs\".  The specimen consists of 2 fibromembranous, friable cores measuring less than 0.1-0.4 cm in diameter and 1.5-1.9                           cm in length.  Entirely submitted. One cassette.  Between sponges  D. The specimen is received in formalin, labeled with the patient's name and hospital number, and is designated \" right breast stereo specimen 4 at 12:00-4 cores without calcs\".  The specimen consists of 4 fibromembranous, friable, hemorrhagic cores measuring less than 0.1-0.5 cm in diameter tan 1.5-2.1 cm in length.  Entirely submitted. Two cassettes.  Between sponges    The cold ischemia time based upon information provided by the submitting clinician and receiving staff in the laboratory is within 1 minutes.    Note: The estimated total formalin fixation time based upon information provided by the submitting clinician and the standard processing schedule is 10.50 hours.      [facility] Outpatient Visit on 02/14/2025   Component Date Value Ref " Range Status    Case Report 02/14/2025    Final                    Value:Surgical Pathology Report                         Case: [id number]                                  Authorizing Provider:  Gricelda Beal,   Collected:           02/14/2025 1338                                     TARSHA                                                                         Ordering Location:     [facility] Breast Received:            02/14/2025 1830                                     Center                                                                       Pathologist:           Chelsie Alvarado DO                                                     Specimens:   A) - Breast, Left, US BX Left Breast 11:00 Periareolar 3 [id number] Marquee                          B) - Axillary lymph node, US BX Left Axilla 3 Passes 14G Marquee                                    C) - Breast, Right, US BX Right Breast 9:00 Retroareolar 3 Passes 14G Marquee                       D) - Breast, Right, US BX Right Breast 10:00 Periareolar 3 Passes 14G Marquee              Final Diagnosis 02/14/2025    Final                    Value:A. Breast, Left (11:00, Periareolar), Biopsy:  - Breast tissue with dense stromal fibrosis and focal atypical lobular hyperplasia (ALH). See Note.    B. Axillary Lymph Node, Left Axilla, Biopsy:  - Benign fibroadipose tissue with minute focus of lymphocytes; definitive lymph node tissue not identified.  - Multiple additional levels were evaluated.    C. Breast, Right (9:00, Retroareolar), Biopsy:  - Breast tissue with fibrocystic changes. See Note.    D. Breast, Right (10:00, Periareolar), Biopsy:  - Breast tissue with fibrocystic changes and microcalcifications. See Note.      Note 02/14/2025    Final                    Value:Immunohistochemical stains for CK5/6, ER, E-Cadherin, p120, Calponin, and p63 support the diagnosis. Clinical and radiologic correlation recommended to ensure that the targeted  "lesional area was adequately sampled.    Intradepartmental consultation is in agreement (IK).      Additional Information 02/14/2025    Final                    Value:All reported additional testing was performed with appropriately reactive controls.  These tests were developed and their performance characteristics determined by [facility] or appropriate performing facility, though some tests may be performed on tissues which have not been validated for performance characteristics (such as staining performed on alcohol exposed cell blocks and decalcified tissues).  Results should be interpreted with caution and in the context of the patients’ clinical condition. These tests may not be cleared or approved by the U.S. Food and Drug Administration, though the FDA has determined that such clearance or approval is not necessary. These tests are used for clinical purposes and they should not be regarded as investigational or for research. This laboratory has been approved by CLIA 88, designated as a high-complexity laboratory and is qualified to perform these tests.      Interpretation performed at [facility] [address]      Gross Description 02/14/2025    Final                    Value:A. The specimen is received in formalin, labeled with the patient's name and hospital number, and is designated \" numerous biopsy left breast 11:00 periareolar 3 passes 14G marquee\".  The specimen consists of 3 tan-yellow cores of fibrofatty and friable soft tissue measuring less than 0.1-0.2 cm in diameter and ranging from 1.1-1.3 cm in length.  Entirely submitted. Two cassettes.  Between sponges.  Note: due to the size and consistency of the specimen, the tissue may not survive histologic processing.  B. The specimen is received in formalin, labeled with the patient's name and hospital number, and is designated \" US biopsy left axilla 3 passes 14G marquee\".  The " "specimen consists of 3 tan-yellow cores of fibrofatty and friable soft tissue measuring less than 0.1-0.2 cm in diameter and ranging from 1.7-2.9 cm in length.  Entirely submitted. Three cassettes.  Between sponges.  Note: due to the size and consistency of the specimen, the tissue may not survive histologic processing.  C.                           The specimen is received in formalin, labeled with the patient's name and hospital number, and is designated \" US biopsy right breast 9:00 retroareolar 3 passes 14G marquee\".  The specimen consists of 3 tan-yellow cores of fibrofatty and friable soft tissue measuring less than 0.1-0.2 cm in diameter and ranging from 1.4-2.3 cm in length.  Entirely submitted. Two cassettes.  Between sponges.  Note: due to the size and consistency of the specimen, the tissue may not survive histologic processing.  D. The specimen is received in formalin, labeled with the patient's name and hospital number, and is designated \" US biopsy right breast 10:00 periareolar 3 passes 14G marquee\".  The specimen consists of 3 tan-yellow cores of fibrofatty and friable soft tissue measuring less than 0.1-0.2 cm in diameter and ranging from 1.2-2.2 cm in length.  Entirely submitted. Two cassettes.  Between sponges.  Note: due to the size and consistency of the specimen, the tissue may not survive histologic                           processing.    The cold ischemia time based upon information provided by the submitting clinician and receiving staff in the laboratory is within 1 minute.    Note: The estimated total formalin fixation time based upon information provided by the submitting clinician and the standard processing schedule is 10.0 hours.    -[facility]         Pathology: I have reviewed pathology reports described above.    Radiology Results Review: I personally reviewed the following image studies in PACS and associated radiology reports: 8/20/2024 bilateral 3D screening mammogram, 10/14/2024 " right diagnostic mammogram, 2/2/2025 bilateral breast MRI,. My interpretation of the radiology images/reports is: right breast DCIS with Spanning roughly 4.4 cm, standard clips in place; left breast 11:00 atypical lobular hyperplasia in the area of non-mass enhancement on breast MRI, standard clip in place; bilobed mass left breast 9:00 retroareolar has not been evaluated.  [place]: yes

## 2025-03-11 ENCOUNTER — TELEPHONE (OUTPATIENT)
Dept: SURGICAL ONCOLOGY | Facility: CLINIC | Age: 70
End: 2025-03-11

## 2025-03-11 ENCOUNTER — TELEPHONE (OUTPATIENT)
Age: 70
End: 2025-03-11

## 2025-03-11 NOTE — TELEPHONE ENCOUNTER
Called patient per nurse navigator's request to discuss questions and concerns.    Per patient, she is concerned with post-surgery treatments, including medical and radiation oncology, mostly regarding side effects. Discussed that when she meets with both radiation and medical oncology after surgery, they will discuss with her the risks versus the benefits of added therapies.     Patient is also concerned with the costs of treatments, and fears for losing her home due to costs of treatment. She is currently insured under Medicare/Medicaid, but still expresses concerns with being able to keep her home. Discussed with patient that I will reach out to the financial advocacy team on her behalf to discuss financial concerns.     Patient also expressed frustration that she has not heard back from the [facility] regarding scheduling her ordered MRI-guided bx. Discussed with patient that scheduling this biopsy may take a little bit of time. Informed patient that I reached out to the [facility], and that they should be in contact with her later on today (per Harmony at the [facility]).     No further questions or concerns at this time.

## 2025-03-12 ENCOUNTER — PATIENT OUTREACH (OUTPATIENT)
Dept: CASE MANAGEMENT | Facility: OTHER | Age: 70
End: 2025-03-12

## 2025-03-12 LAB
GENE DIS ANL INTERP-IMP: NORMAL
GENES NOT REPORTED: NORMAL
INTERPRETATION: NORMAL

## 2025-03-12 NOTE — PROGRESS NOTES
Biopsychosocial and Barriers Assessment    Type of Cancer: Breast  Treatment plan: TBD  Noted barriers to care: none  Cultural/Nondenominational concerns: none  Hair Loss/ Wig resources needed: did not discuss     DT completed: yes  DT score: 2/10  Issues noted: fatigue, loss/change physical abilities, grief/loss, change in appearance, finances, insurance.     Marital status/Lives with: , resides with her dog  Pt's support system: Patient reported that she doesn't have much of a support system because the people that were a support have their own medical issues. 1 person has a luann tumor and the other had a stroke  Mental Health history: none  Substance Abuse: none    Employment/income source: SSI  Concerns with bills (treatment vs household): Patient expressed concern regarding cost of treatment and whether or not her insurance will cover the costs. Patient has [facility] and [employer].   Discussion had regarding [place] [facility]. [facility] will send application and will also refer to Oncology finance team to review to help reduce stress related to same.   Noted issues with home: none reported     Narrative note:  [facility] outreached to patient. Assessment and DT completed today during call. Patient self rated DT 2/10. Patient reported that emotionally she is struggling with knowing that her breast isn't going to be there. Discussed benefit of support groups/counseling services. Patient in agreement to receiving resources for same which [facility] will send including [facility], local therapists.     Patient does report that she has Living Will and POA completed.   Patient does report that her father  about 1 year ago. She reported that this year was supposed to be the year that she focused on herself but not like this.     Patient did report that she is tired and that she wakes up early and then tries to nap but people tend to call when she is napping which is what this [facility] did today. Patient does have obligations this  afternoon as well so trying to get a nap in before. [facility] will send resources/[place] [facility] application and [facility] business card as discussed.

## 2025-03-13 ENCOUNTER — PATIENT OUTREACH (OUTPATIENT)
Dept: HEMATOLOGY ONCOLOGY | Facility: CLINIC | Age: 70
End: 2025-03-13

## 2025-03-13 ENCOUNTER — RESULTS FOLLOW-UP (OUTPATIENT)
Dept: GENETICS | Facility: CLINIC | Age: 70
End: 2025-03-13

## 2025-03-13 NOTE — PROGRESS NOTES
I returned patient's phone call this morning regarding results of her genetic testing. I reviewed the results with her, I let her know we were still waiting on the full gene panel which may take another 2-3 week to come in. Patient is very anxious and had many questions and concerns regarding treatment, surgery, transportation to MRI biopsy etc. Reassurance was offered to patient. I spent 30 minutes answering all her questions and concerns. Message sent to Gracie Huerta regarding assisting with pt transport to MRI biopsy next Friday 3/21/2025. Patient has my contact info if she has any further questions or concerns.

## 2025-03-14 ENCOUNTER — TELEPHONE (OUTPATIENT)
Dept: HEMATOLOGY ONCOLOGY | Facility: CLINIC | Age: 70
End: 2025-03-14

## 2025-03-14 NOTE — TELEPHONE ENCOUNTER
Writer spoke with PT on 3/14/25 @ 9:20 AM for introductions and open dialogue to discuss any financial concerns/questions she may have. PT inquired about her insurance coverage and if she is adequately insured for any upcoming treatments/services. Writer confirmed to PT that she has excellent insurance coverage with her [facility] supplemental plan and has not outstanding bills/charges on her hospital account at this time. PT was very grateful for this confirmation and the call. Saadiar provided all contact information to PT for future use.        Cullen Jefferson  Phone:[phone]  Email:[email or web address]

## 2025-03-16 LAB
GENE DIS ANL INTERP-IMP: NORMAL
INTERPRETATION: NORMAL

## 2025-03-17 ENCOUNTER — TELEPHONE (OUTPATIENT)
Age: 70
End: 2025-03-17

## 2025-03-17 NOTE — TELEPHONE ENCOUNTER
Pt calling in because appointments are being scheduled without her knowledge and she would like to be discussed why this appointment was scheduled,    Please advise, thank you.

## 2025-03-17 NOTE — TELEPHONE ENCOUNTER
Pt calling to ask if can continue to use estrogen vaginal topical cream, due to new diagnosis of Breast Cancer.     If needs an alternative, can send alternative medication to pharmacy on file. Wanted to ensure provider is aware she also has Fibromyalgia and Osteoporosis, and does not want a medication that worsens those symptoms.     RN provided emotional support. Advised will route to provider and staff will return a call. Pt states OK to leave detailed voicemail if she does not answer either on mobile or home phone numbers.

## 2025-03-21 ENCOUNTER — HOSPITAL ENCOUNTER (OUTPATIENT)
Dept: RADIOLOGY | Facility: HOSPITAL | Age: 70
Discharge: HOME | End: 2025-03-21
Attending: SURGERY
Payer: MEDICARE

## 2025-03-21 VITALS — DIASTOLIC BLOOD PRESSURE: 61 MMHG | SYSTOLIC BLOOD PRESSURE: 132 MMHG

## 2025-03-21 DIAGNOSIS — R92.8 ABNORMAL MRI, BREAST: ICD-10-CM

## 2025-03-21 PROCEDURE — 88305 TISSUE EXAM BY PATHOLOGIST: CPT | Performed by: PATHOLOGY

## 2025-03-21 PROCEDURE — 88341 IMHCHEM/IMCYTCHM EA ADD ANTB: CPT | Performed by: PATHOLOGY

## 2025-03-21 PROCEDURE — 19085 BX BREAST 1ST LESION MR IMAG: CPT

## 2025-03-21 PROCEDURE — A9585 GADOBUTROL INJECTION: HCPCS | Performed by: SURGERY

## 2025-03-21 PROCEDURE — 88342 IMHCHEM/IMCYTCHM 1ST ANTB: CPT | Performed by: PATHOLOGY

## 2025-03-21 PROCEDURE — A4648 IMPLANTABLE TISSUE MARKER: HCPCS

## 2025-03-21 RX ORDER — GADOBUTROL 604.72 MG/ML
7 INJECTION INTRAVENOUS
Status: COMPLETED | OUTPATIENT
Start: 2025-03-21 | End: 2025-03-21

## 2025-03-21 RX ORDER — LIDOCAINE HYDROCHLORIDE 10 MG/ML
5 INJECTION, SOLUTION EPIDURAL; INFILTRATION; INTRACAUDAL; PERINEURAL ONCE
Status: COMPLETED | OUTPATIENT
Start: 2025-03-21 | End: 2025-03-21

## 2025-03-21 RX ORDER — LIDOCAINE HYDROCHLORIDE AND EPINEPHRINE BITARTRATE 20; .01 MG/ML; MG/ML
20 INJECTION, SOLUTION SUBCUTANEOUS ONCE
Status: COMPLETED | OUTPATIENT
Start: 2025-03-21 | End: 2025-03-21

## 2025-03-21 RX ADMIN — LIDOCAINE HYDROCHLORIDE 5 ML: 10 INJECTION, SOLUTION EPIDURAL; INFILTRATION; INTRACAUDAL; PERINEURAL at 08:53

## 2025-03-21 RX ADMIN — GADOBUTROL 7 ML: 604.72 INJECTION INTRAVENOUS at 08:52

## 2025-03-21 RX ADMIN — LIDOCAINE HYDROCHLORIDE,EPINEPHRINE BITARTRATE 20 ML: 20; .01 INJECTION, SOLUTION INFILTRATION; PERINEURAL at 08:52

## 2025-03-21 NOTE — NURSING NOTE
Patient's left breast biopsy completed by MD Holliday, patient tolerated well with no complaints verbalized. After holding pressure to site, steri-strips applied and band aide. Post procedure discharge instructions and ice packs given to patient with verbal communication of understanding. Patient left MRI suite ambulating with no complaints.

## 2025-03-22 ENCOUNTER — TELEPHONE (OUTPATIENT)
Dept: OTHER | Facility: OTHER | Age: 70
End: 2025-03-22

## 2025-03-22 NOTE — TELEPHONE ENCOUNTER
Patient had MRI/biopsy yesterday and is calling today because the steri strip is saturated in blood.

## 2025-03-24 NOTE — PROGRESS NOTES
Post procedure call completed    Bleeding: _x___yes _____no    Pain: ___x__yes _____no    Redness/Swelling: ______yes ___X___no    Band aid removed: __x___yes ____no (discussed removing when she showers)    Steri-Strips intact: ______yes _x____no       Patient states the steri strips became saturated with blood over the weekend and came off. She currently has gauze over the site and hasn't looked at it yet this morning to see if there is blood on it. I advised patient that if she wants to continue to use gauze to change it daily and keep the area clean. She is still having pain at the site and says tylenol does nothing for the pain. I suggested to ice the area today for pain management.           Pt with no questions at this time, adv will call when results available, adv to call with any questions or concerns, has name/# for contact

## 2025-03-26 ENCOUNTER — OFFICE VISIT (OUTPATIENT)
Dept: SURGICAL ONCOLOGY | Facility: CLINIC | Age: 70
End: 2025-03-26
Payer: MEDICARE

## 2025-03-26 ENCOUNTER — TELEPHONE (OUTPATIENT)
Age: 70
End: 2025-03-26

## 2025-03-26 VITALS
OXYGEN SATURATION: 98 % | TEMPERATURE: 97.3 F | BODY MASS INDEX: 26.79 KG/M2 | DIASTOLIC BLOOD PRESSURE: 72 MMHG | HEART RATE: 65 BPM | SYSTOLIC BLOOD PRESSURE: 138 MMHG | HEIGHT: 66 IN

## 2025-03-26 DIAGNOSIS — D05.11 DUCTAL CARCINOMA IN SITU (DCIS) OF RIGHT BREAST: Primary | ICD-10-CM

## 2025-03-26 DIAGNOSIS — Z22.322 MRSA CARRIER: ICD-10-CM

## 2025-03-26 DIAGNOSIS — N60.92 ATYPICAL LOBULAR HYPERPLASIA (ALH) OF LEFT BREAST: ICD-10-CM

## 2025-03-26 DIAGNOSIS — R92.8 ABNORMAL MRI, BREAST: ICD-10-CM

## 2025-03-26 DIAGNOSIS — N60.99 ATYPICAL DUCTAL HYPERPLASIA OF BREAST: ICD-10-CM

## 2025-03-26 PROCEDURE — 99215 OFFICE O/P EST HI 40 MIN: CPT | Performed by: SURGERY

## 2025-03-26 RX ORDER — PHENOL 1.4 %
AEROSOL, SPRAY (ML) MUCOUS MEMBRANE
COMMUNITY

## 2025-03-26 RX ORDER — HYDROCORTISONE 25 MG/G
CREAM TOPICAL
COMMUNITY
Start: 2025-03-17

## 2025-03-26 RX ORDER — TRAMADOL HYDROCHLORIDE 50 MG/1
50 TABLET ORAL EVERY 8 HOURS PRN
Qty: 9 TABLET | Refills: 0 | Status: SHIPPED | OUTPATIENT
Start: 2025-03-26

## 2025-03-26 RX ORDER — LIDOCAINE HYDROCHLORIDE 10 MG/ML
INJECTION, SOLUTION INFILTRATION; PERINEURAL
COMMUNITY
Start: 2025-03-14

## 2025-03-26 RX ORDER — BLOOD SUGAR DIAGNOSTIC
STRIP MISCELLANEOUS
COMMUNITY
Start: 2025-03-07

## 2025-03-26 NOTE — ASSESSMENT & PLAN NOTE
Orders:    Case request operating room: RIGHT BREAST BRACKETED ANGEL-LOCALIZED LUMPECTOMY, LEFT BREAST ANGEL LOCALIZED LUMPECTOMY OF ADH; Standing    UA w Reflex to Microscopic w Reflex to Culture; Future    Comprehensive metabolic panel; Future    CBC and differential; Future    XR chest pa and lateral; Future    traMADol (Ultram) 50 mg tablet; Take 1 tablet (50 mg total) by mouth every 8 (eight) hours as needed for severe pain    MAMMO CLIP NEEDLE LOC RIGHT (ALL INC); Future    Mammo clip needle loc right (all inc) ea add; Future    Mammo diagnostic bilateral w 3d and cad; Future

## 2025-03-26 NOTE — H&P (VIEW-ONLY)
Name: Jessica Cartagena      : 1955      MRN: [id number]  Encounter Provider: Garima Christianson MD  Encounter Date: 3/26/2025   Encounter department: [facility] [place]  :  Assessment & Plan  Ductal carcinoma in situ (DCIS) of right breast    Orders:    Case request operating room: RIGHT BREAST BRACKETED DEBRA-LOCALIZED LUMPECTOMY, LEFT BREAST DEBRA LOCALIZED LUMPECTOMY OF ADH; Standing    UA w Reflex to Microscopic w Reflex to Culture; Future    Comprehensive metabolic panel; Future    CBC and differential; Future    XR chest pa and lateral; Future    traMADol (Ultram) 50 mg tablet; Take 1 tablet (50 mg total) by mouth every 8 (eight) hours as needed for severe pain    MAMMO CLIP NEEDLE LOC RIGHT (ALL INC); Future    Mammo clip needle loc right (all inc) ea add; Future    Mammo diagnostic bilateral w 3d and cad; Future    Atypical lobular hyperplasia (ALH) of left breast    Orders:    US breast clip left; Future    Abnormal MRI, breast         MRSA carrier         69-year-old female who presented with an abnormal MRI done secondary to the history of atypical duct hyperplasia.  This led to a left sided biopsy revealing atypical lobular hyperplasia as well as right sided biopsy revealing ductal carcinoma in situ.  She had an additional MRI guided biopsy which was benign and concordant.  Her genetic testing was negative.  She would like to proceed with breast conservation.  She was counseled on a bracketed DEBRA localized lumpectomy of the right breast.  There is no current indication for lymph node sampling.  We also previously discussed excising the area of atypical lobular hyperplasia given the enhancement noted on MRI.  All of her questions were answered.  Consent was signed today in the office.  She does need to have the Debra reflectors placed.  Her last full bilateral mammogram was 2024.  She will therefore have a bilateral diagnostic mammogram at that time.  Surgery will  be scheduled in the near term.        History of Present Illness   Jessica Cartagena is a 69 y.o. year old female who presents to discuss surgical management of the right breast DCIS and left breast atypia.  She had an additional MRI guided biopsy which was benign and concordant.  She also had genetic testing which showed no mutations.     Oncology History   Cancer Staging   Ductal carcinoma in situ (DCIS) of right breast  Staging form: Breast, AJCC 8th Edition  - Clinical stage from 2/19/2025: Stage 0 (cTis (DCIS), cN0, cM0, ER+, WI+, HER2: Not Assessed) - Signed by Garima Christianson MD on 3/10/2025  Stage prefix: Initial diagnosis  Method of lymph node assessment: Clinical  Nuclear grade: G2  Oncology History   Ductal carcinoma in situ (DCIS) of right breast   2/14/2025 Biopsy    B/L US-guided Bx    A. Left 11:00 periareolar  Breast tissue with dense stromal fibrosis and focal ALH    B. Left axillary lymph node  Benign fibroadipose tissue with minute focus of lymphocytes; definitive lymph node tissue not identified. Multiple additional levels were evaluated.    C. Right 9:00 retroareolar  Breast tissue with fibrocystic changes.    D. Right 10:00 periareolar  Breast tissue with fibrocystic changes and microcalcifications.     2/19/2025 Biopsy    Right stereo bx, multi site:    A. 1:00, With Calcs  DCIS & LCIS adjacent to and involving a sclerosing lesion  Grade 2  ER 95, WI 80    B. 1:00, W/O Calcs  DCIS & LCIS adjacent to and involving a sclerosing lesion    C. 12:00, With Calcs  Fibroadenoma and sclerosing changes; microcalcifications present in fibroadenoma     D. 12:00 W/O Calcs  Benign breast parenchyma, without atypia, including apocrine metaplasia, sclerosing changes, duct dilatation. No evidence of malignancy.       2/19/2025 -  Cancer Staged    Staging form: Breast, AJCC 8th Edition  - Clinical stage from 2/19/2025: Stage 0 (cTis (DCIS), cN0, cM0, ER+, WI+, HER2: Not Assessed) - Signed by Garima Christianson MD on  3/10/2025  Stage prefix: Initial diagnosis  Method of lymph node assessment: Clinical  Nuclear grade: G2       3/3/2025 Genetic Testing    [facility]    VUS of APC     3/21/2025 Biopsy    Left MRI-guided bx    Benign breast tissue with sclerosing adenosis, fibrocystic changes, and microcalcifications.         Review of Systems   Constitutional: Negative.  Negative for appetite change, fever and unexpected weight change.   HENT: Negative.  Negative for trouble swallowing.    Eyes: Negative.    Respiratory: Negative.  Negative for cough and shortness of breath.    Cardiovascular: Negative.  Negative for chest pain.   Gastrointestinal: Negative.  Negative for abdominal pain, nausea and vomiting.   Endocrine: Negative.    Genitourinary: Negative.  Negative for dysuria.   Musculoskeletal: Negative.  Negative for arthralgias and myalgias.   Skin: Negative.    Allergic/Immunologic: Negative.    Neurological: Negative.  Negative for headaches.   Hematological: Negative.  Negative for adenopathy. Does not bruise/bleed easily.   Psychiatric/Behavioral:  The patient is nervous/anxious.     A complete review of systems is negative other than that noted above in the HPI.    Past Medical History   History reviewed. No pertinent past medical history.  Past Surgical History:   Procedure Laterality Date    BREAST BIOPSY Right 03/01/2013    adh    BREAST BIOPSY Left 06/18/2009    benign    BREAST BIOPSY Left 2010    stereo-benign    BREAST BIOPSY Right 2006    benign    BREAST CYST EXCISION      BREAST EXCISIONAL BIOPSY Left 01/06/2011    fernando, alfermin, radial scar    BREAST EXCISIONAL BIOPSY Right 03/19/2013    adh    COLONOSCOPY      MAMMO STEREOTACTIC BREAST BIOPSY RIGHT (ALL INC) Right 2/19/2025    MAMMO STEREOTACTIC BREAST BIOPSY RIGHT (ALL INC) EACH ADD Right 2/19/2025    MRI BREAST BIOPSY LEFT (ALL INCLUSIVE) Left 3/21/2025    TOTAL ABDOMINAL HYSTERECTOMY N/A 2004    age 49    US GUIDANCE BREAST BIOPSY  LEFT EACH ADDITIONAL Left 2/14/2025    US GUIDANCE BREAST BIOPSY RIGHT EACH ADDITIONAL Right 2/14/2025    US GUIDED BREAST BIOPSY RIGHT COMPLETE Right 2/14/2025    US GUIDED BREAST LYMPH NODE BIOPSY LEFT Left 2/14/2025     Family History   Problem Relation Age of Onset    Fibromyalgia Mother     Pancreatic cancer Mother 68    Heart disease Father     No Known Problems Sister     Breast cancer Maternal Aunt         age unknown    Breast cancer Maternal Aunt         age unknown    No Known Problems Maternal Aunt     No Known Problems Maternal Aunt     No Known Problems Maternal Grandmother     No Known Problems Maternal Grandfather     No Known Problems Paternal Grandmother     No Known Problems Paternal Grandfather     Completed Suicide  Son     Bipolar disorder Son     BRCA2 Positive Neg Hx     BRCA2 Negative Neg Hx     BRCA1 Negative Neg Hx     BRCA 1/2 Neg Hx     Endometrial cancer Neg Hx     Ovarian cancer Neg Hx     BRCA1 Positive Neg Hx     Colon cancer Neg Hx     Breast cancer additional onset Neg Hx       reports that she has quit smoking. She has never used smokeless tobacco. She reports that she does not currently use alcohol. She reports current drug use. Drug: Marijuana.  Current Outpatient Medications   Medication Instructions    benzonatate (TESSALON PERLES) 100 mg capsule TAKE 2 CAPSULES BY MOUTH EVERY 8 HOURS AS NEEDED FOR COUGH    Calcium 500-125 MG-UNIT TABS 600 mg    CARAFATE 1 GM/10ML suspension 10 MILLILITER BEFORE MEALS AND AT BEDTIME    chlorthalidone 12.5 mg, Daily    Cholecalciferol (VITAMIN D3) 1000 units CAPS Take by mouth    [employer] Vitamin B-12 1,200 mcg, Daily    estradiol (ESTRACE) 0.1 mg/g vaginal cream INSERT 1 GRAM VAGINALLY  WEEKLY    famotidine (Pepcid) 20 mg tablet Every 12 hours    fluticasone (FLONASE) 50 mcg/act nasal spray 2 SPRAYS INTO EACH NOSTRIL DAILY.    hydrocortisone 2.5 % cream APPLY TO AFFECTED AREAS ON CHEST (AVOID FACE/SKIN FOLDS) TWICE A DAY FOR TWO WEEKS. USE ONLY AS  "NEEDED WITH FLARES.    lidocaine (Xylocaine) 1 % Take 1 mL by injection route.    Multiple Vitamins-Minerals (Multivitamin Women) TABS Oral    multivitamin (THERAGRAN) TABS 1 tablet, Daily    nitrofurantoin (MACROBID) 100 mg, Oral, 2 times daily    Omega-3 Fatty Acids (FISH OIL PO) 2,400 mcg, 3 times daily    ONETOUCH DELICA LANCETS 33G MISC TEST 3 X DAILY    OneTouch Ultra test strip TEST BLOOD GLUCOSE DAILY    PROAIR  (90 Base) MCG/ACT inhaler 1 puff, Every 6 hours PRN    REPLENS VAGINAL MOISTURIZER VA Insert into the vagina    Sodium Hyaluronate, oral, (HYALURONIC ACID PO) Take by mouth    traMADol (ULTRAM) 50 mg, Oral, Every 8 hours PRN    vitamin E (tocopherol) 400 Units, Daily     Allergies   Allergen Reactions    Pantoprazole Anaphylaxis and Other (See Comments)     Throat closing; trouble swallowing    Sucralfate Anaphylaxis and Other (See Comments)     Throat closing; trouble swallowing    Ibuprofen     Penicillins Hives    Tropicamide Other (See Comments)     Blurry vision    Bupropion Rash    Medical Tape Hives, Itching and Rash           Objective   /72 (BP Location: Left arm, Patient Position: Sitting, Cuff Size: Standard)   Pulse 65   Temp (!) 97.3 °F (36.3 °C) (Temporal)   Ht 5' 6\" (1.676 m)   SpO2 98%   BMI 26.79 kg/m²     Pain Screening:  Pain Score: 0-No pain (L breast if you bump it or press on it, it hurts)  ECOG    Physical Exam  Constitutional:       General: She is not in acute distress.     Appearance: Normal appearance.   HENT:      Head: Normocephalic and atraumatic.   Cardiovascular:      Rate and Rhythm: Regular rhythm.      Heart sounds: Normal heart sounds.   Pulmonary:      Breath sounds: Normal breath sounds.   Chest:   Breasts:     Right: No swelling, bleeding, inverted nipple, mass, nipple discharge, skin change or tenderness.      Left: Swelling, skin change (Resolving ecchymosis of the left breast from the recent MRI guided biopsy) and tenderness present. No " bleeding, inverted nipple, mass or nipple discharge.   Musculoskeletal:      Right lower leg: No edema.      Left lower leg: No edema.   Lymphadenopathy:      Upper Body:      Right upper body: No supraclavicular, axillary or pectoral adenopathy.      Left upper body: No supraclavicular, axillary or pectoral adenopathy.   Neurological:      Mental Status: She is alert and oriented to person, place, and time.   Psychiatric:         Mood and Affect: Mood normal.          Labs: I have reviewed pertinent labs.     3/21/2025 MRI guided biopsy left breast retroareolar was benign and concordant    3/3/2025 genetic testing shows a variant of uncertain significance in APC but no mutations    Hospital Outpatient Visit on 03/21/2025   Component Date Value Ref Range Status    Case Report 03/21/2025    Final                    Value:Surgical Pathology Report                         Case: [id number]                                  Authorizing Provider:  Garima Christianson MD           Collected:           03/21/2025 0853              Ordering Location:     [facility]      Received:            03/21/2025 [address] [place] [address] MRI                                                                 Pathologist:           Chelsie Alvarado DO                                                     Specimen:    Breast, Left, Left sided mri guided breast biopsy 6 passes mass retroareolar               Final Diagnosis 03/21/2025    Final                    Value:A. Breast, Left (Retroareolar Mass), Biopsy:  - Benign breast tissue with sclerosing adenosis, fibrocystic changes, and microcalcifications. See Note.      Note 03/21/2025    Final                    Value:An focus of sclerosing adenosis shows a hint of associated fibroelastosis, raising the possibility of a tangentially-sectioned radial/complex sclerosing lesion. E-Cadherin and p120 immunohistochemical stains are positive in a membranous pattern,  "while Calponin and p63 highlight the presence of myoepithelial cells. The findings support the diagnosis.    Clinical and radiologic correlation recommended to ensure that the targeted lesional area was adequately sampled.      Additional Information 03/21/2025    Final                    Value:All reported additional testing was performed with appropriately reactive controls.  These tests were developed and their performance characteristics determined by [facility] or appropriate performing facility, though some tests may be performed on tissues which have not been validated for performance characteristics (such as staining performed on alcohol exposed cell blocks and decalcified tissues).  Results should be interpreted with caution and in the context of the patients’ clinical condition. These tests may not be cleared or approved by the U.S. Food and Drug Administration, though the FDA has determined that such clearance or approval is not necessary. These tests are used for clinical purposes and they should not be regarded as investigational or for research. This laboratory has been approved by CLIA 88, designated as a high-complexity laboratory and is qualified to perform these tests.      Gross Description 03/21/2025    Final                    Value:A. The specimen is received in formalin, labeled with the patient's name and hospital number, and is designated \" left-sided MRI-guided breast biopsy 6 passes, mass retroareolar\".  The specimen consists of 6 yellow fatty and friable tissue cores measuring less than 0.1-0.5 cm in diameter and ranging from 1.0-1.7 cm in length.  The specimen is entirely submitted between sponges, 3 cassettes.    Note: The estimated total formalin fixation time based upon information provided by the submitting clinician and the standard processing schedule is under 72 hours.  -Danelle Aguilar      Clinical Information 03/21/2025    Final                    " Value:Left sided mri guided breast biopsy 6 passes mass retroareolar   Appointment on 03/03/2025   Component Date Value Ref Range Status    Sodium 03/03/2025 141  135 - 147 mmol/L Final    Potassium 03/03/2025 3.8  3.5 - 5.3 mmol/L Final    Chloride 03/03/2025 102  96 - 108 mmol/L Final    CO2 03/03/2025 24  21 - 32 mmol/L Final    ANION GAP 03/03/2025 15 (H)  4 - 13 mmol/L Final    BUN 03/03/2025 19  5 - 25 mg/dL Final    Creatinine 03/03/2025 0.81  0.60 - 1.30 mg/dL Final    Standardized to IDMS reference method    Glucose 03/03/2025 111  65 - 140 mg/dL Final    If the patient is fasting, the ADA then defines impaired fasting glucose as > 100 mg/dL and diabetes as > or equal to 123 mg/dL.    Calcium 03/03/2025 10.0  8.4 - 10.2 mg/dL Final    eGFR 03/03/2025 74  ml/min/1.73sq m Final    GENETIC ANALYSIS OVERALL INTERPRET* 03/03/2025 NEGATIVE: No Clinically Significant Variants Detected   Final    INTERPRETATION 03/03/2025 See Notes   Final    No pathogenic mutations, variants of unknown significance, or gross deletions or duplications were detected.  Risk Estimate: low likelihood of variants in the genes analyzed contributing to this individual's clinical history.  Genetic counseling is a recommended option for all individuals undergoing genetic testing.  Genes Analyzed (13 total): FRANCE, BARD1, BRCA1, BRCA2, CDH1, CHEK2, NF1, PALB2, PTEN, RAD51C, RAD51D, STK11 and TP53 (sequencing and deletion/duplication).    GENES NOT REPORTED 03/03/2025 FRANCE,BARD1,CDH1,CHEK2,PALB2,PTEN,RAD51C,RAD51D,STK11,TP53,NF1,BRCA1,BRCA2   Final    GENETIC ANALYSIS OVERALL INTERPRET* 03/03/2025 Variants of Unknown Significance Detected   Final    INTERPRETATION 03/03/2025 See Notes   Final    Comment: No known clinically actionable alterations were detected.  Two variants of unknown significance were detected in the APC gene.  Risk Estimate: should be based on clinical and family history, as the clinical significance of this result is  unknown.  Genetic counseling is a recommended option for all individuals undergoing genetic testing.  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 Familial testing would be necessary to determine if the alterations in APC are on the same chromosome or on different chromosomes (in cis or trans). Refer to the supplementary pages for additional information on these variants. No additional pathogenic mutations, variants of unknown significance, or gross deletions or duplications were detected. Genes Analyzed (59 total): APC, FRANCE, BAP1, BARD1, BMPR1A, BRCA1, BRCA2, BRIP1, CDH1, CDK4, CDKN1B, CDKN2A,                            CHEK2, DICER1, FH, FLCN, KIF1B, MAX, MEN1, MET, MLH1, MSH2, MSH6, MUTYH, NF1, NTHL1, PALB2, PMS2, POT1, PTEN, RAD51C, RAD51D, RB1, RET, SDHA, SDHAF2, SDHB, SDHC, SDHD, SMAD4, SMARCA4, STK11, UWYL621, TP53, TSC1, TSC2 and VHL (sequencing and deletion/duplication); AXIN2, CTNNA1, EGLN1, HOXB13, KIT, MITF, MSH3, PDGFRA, POLD1 and POLE (sequencing only); EPCAM and GREM1 (deletion/duplication only). RNA data is routinely analyzed for use in variant interpretation for all genes.    GENES NOT REPORTED 03/03/2025    Final                    Value:FRANCE,AXIN2,BAP1,BARD1,BMPR1A,BRCA1,BRCA2,BRIP1,CDH1,CDK4,CDKN1B,CDKN2A,CHEK2,CTNNA1,DICER1,EGLN1,EPCAM,FH,FLCN,GREM1,HOXB13,KIF1B,KIT,MAX,MEN1,MET,MITF,MLH1,MSH2,MSH3,MSH6,MUTYH,NF1,NTHL1,PALB2,PDGFRA,PMS2,POLD1,POLE,POT1,PTEN,RAD51C,RAD51D,RB1,RET,SDHA,S  DHAF2,SDHB,SDHC,SDHD,SMAD4,SMARCA4,STK11,UQLS385,TP53,TSC1,TSC2,VHL     Pathology: I have reviewed pathology reports described above.    Radiology Results Review: I personally reviewed the following image studies in PACS and associated radiology reports: 3/21/25 mri guided biopsy, 2/19/25 postbiopsy mammo right breast, 2/14/2025 bilateral postbiopsy  mammogram . My interpretation of the radiology images/reports is: Multifocal DCIS of the right breast with calcifications spanning 4.4 cm, atypical lobular hyperplasia 11:00 left breast, standard clips in place.  [place]: yes

## 2025-03-26 NOTE — PROGRESS NOTES
Name: Jessica Cartagena      : 1955      MRN: [id number]  Encounter Provider: Garima Christianson MD  Encounter Date: 3/26/2025   Encounter department: [facility] [place]  :  Assessment & Plan  Ductal carcinoma in situ (DCIS) of right breast    Orders:    Case request operating room: RIGHT BREAST BRACKETED DEBRA-LOCALIZED LUMPECTOMY, LEFT BREAST DEBRA LOCALIZED LUMPECTOMY OF ADH; Standing    UA w Reflex to Microscopic w Reflex to Culture; Future    Comprehensive metabolic panel; Future    CBC and differential; Future    XR chest pa and lateral; Future    traMADol (Ultram) 50 mg tablet; Take 1 tablet (50 mg total) by mouth every 8 (eight) hours as needed for severe pain    MAMMO CLIP NEEDLE LOC RIGHT (ALL INC); Future    Mammo clip needle loc right (all inc) ea add; Future    Mammo diagnostic bilateral w 3d and cad; Future    Atypical lobular hyperplasia (ALH) of left breast    Orders:    US breast clip left; Future    Abnormal MRI, breast         MRSA carrier         69-year-old female who presented with an abnormal MRI done secondary to the history of atypical duct hyperplasia.  This led to a left sided biopsy revealing atypical lobular hyperplasia as well as right sided biopsy revealing ductal carcinoma in situ.  She had an additional MRI guided biopsy which was benign and concordant.  Her genetic testing was negative.  She would like to proceed with breast conservation.  She was counseled on a bracketed DEBRA localized lumpectomy of the right breast.  There is no current indication for lymph node sampling.  We also previously discussed excising the area of atypical lobular hyperplasia given the enhancement noted on MRI.  All of her questions were answered.  Consent was signed today in the office.  She does need to have the Debra reflectors placed.  Her last full bilateral mammogram was 2024.  She will therefore have a bilateral diagnostic mammogram at that time.  Surgery will  be scheduled in the near term.        History of Present Illness   Jessica Cartagena is a 69 y.o. year old female who presents to discuss surgical management of the right breast DCIS and left breast atypia.  She had an additional MRI guided biopsy which was benign and concordant.  She also had genetic testing which showed no mutations.     Oncology History   Cancer Staging   Ductal carcinoma in situ (DCIS) of right breast  Staging form: Breast, AJCC 8th Edition  - Clinical stage from 2/19/2025: Stage 0 (cTis (DCIS), cN0, cM0, ER+, SC+, HER2: Not Assessed) - Signed by Garima Christianson MD on 3/10/2025  Stage prefix: Initial diagnosis  Method of lymph node assessment: Clinical  Nuclear grade: G2  Oncology History   Ductal carcinoma in situ (DCIS) of right breast   2/14/2025 Biopsy    B/L US-guided Bx    A. Left 11:00 periareolar  Breast tissue with dense stromal fibrosis and focal ALH    B. Left axillary lymph node  Benign fibroadipose tissue with minute focus of lymphocytes; definitive lymph node tissue not identified. Multiple additional levels were evaluated.    C. Right 9:00 retroareolar  Breast tissue with fibrocystic changes.    D. Right 10:00 periareolar  Breast tissue with fibrocystic changes and microcalcifications.     2/19/2025 Biopsy    Right stereo bx, multi site:    A. 1:00, With Calcs  DCIS & LCIS adjacent to and involving a sclerosing lesion  Grade 2  ER 95, SC 80    B. 1:00, W/O Calcs  DCIS & LCIS adjacent to and involving a sclerosing lesion    C. 12:00, With Calcs  Fibroadenoma and sclerosing changes; microcalcifications present in fibroadenoma     D. 12:00 W/O Calcs  Benign breast parenchyma, without atypia, including apocrine metaplasia, sclerosing changes, duct dilatation. No evidence of malignancy.       2/19/2025 -  Cancer Staged    Staging form: Breast, AJCC 8th Edition  - Clinical stage from 2/19/2025: Stage 0 (cTis (DCIS), cN0, cM0, ER+, SC+, HER2: Not Assessed) - Signed by Garima Christianson MD on  3/10/2025  Stage prefix: Initial diagnosis  Method of lymph node assessment: Clinical  Nuclear grade: G2       3/3/2025 Genetic Testing    [facility] BRCA[facility] Cancer [facility]    VUS of APC     3/21/2025 Biopsy    Left MRI-guided bx    Benign breast tissue with sclerosing adenosis, fibrocystic changes, and microcalcifications.         Review of Systems   Constitutional: Negative.  Negative for appetite change, fever and unexpected weight change.   HENT: Negative.  Negative for trouble swallowing.    Eyes: Negative.    Respiratory: Negative.  Negative for cough and shortness of breath.    Cardiovascular: Negative.  Negative for chest pain.   Gastrointestinal: Negative.  Negative for abdominal pain, nausea and vomiting.   Endocrine: Negative.    Genitourinary: Negative.  Negative for dysuria.   Musculoskeletal: Negative.  Negative for arthralgias and myalgias.   Skin: Negative.    Allergic/Immunologic: Negative.    Neurological: Negative.  Negative for headaches.   Hematological: Negative.  Negative for adenopathy. Does not bruise/bleed easily.   Psychiatric/Behavioral:  The patient is nervous/anxious.     A complete review of systems is negative other than that noted above in the HPI.    Past Medical History   History reviewed. No pertinent past medical history.  Past Surgical History:   Procedure Laterality Date    BREAST BIOPSY Right 03/01/2013    adh    BREAST BIOPSY Left 06/18/2009    benign    BREAST BIOPSY Left 2010    stereo-benign    BREAST BIOPSY Right 2006    benign    BREAST CYST EXCISION      BREAST EXCISIONAL BIOPSY Left 01/06/2011    fernando, alfermin, radial scar    BREAST EXCISIONAL BIOPSY Right 03/19/2013    adh    COLONOSCOPY      MAMMO STEREOTACTIC BREAST BIOPSY RIGHT (ALL INC) Right 2/19/2025    MAMMO STEREOTACTIC BREAST BIOPSY RIGHT (ALL INC) EACH ADD Right 2/19/2025    MRI BREAST BIOPSY LEFT (ALL INCLUSIVE) Left 3/21/2025    TOTAL ABDOMINAL HYSTERECTOMY N/A 2004    age 49    US GUIDANCE BREAST BIOPSY  LEFT EACH ADDITIONAL Left 2/14/2025    US GUIDANCE BREAST BIOPSY RIGHT EACH ADDITIONAL Right 2/14/2025    US GUIDED BREAST BIOPSY RIGHT COMPLETE Right 2/14/2025    US GUIDED BREAST LYMPH NODE BIOPSY LEFT Left 2/14/2025     Family History   Problem Relation Age of Onset    Fibromyalgia Mother     Pancreatic cancer Mother 68    Heart disease Father     No Known Problems Sister     Breast cancer Maternal Aunt         age unknown    Breast cancer Maternal Aunt         age unknown    No Known Problems Maternal Aunt     No Known Problems Maternal Aunt     No Known Problems Maternal Grandmother     No Known Problems Maternal Grandfather     No Known Problems Paternal Grandmother     No Known Problems Paternal Grandfather     Completed Suicide  Son     Bipolar disorder Son     BRCA2 Positive Neg Hx     BRCA2 Negative Neg Hx     BRCA1 Negative Neg Hx     BRCA 1/2 Neg Hx     Endometrial cancer Neg Hx     Ovarian cancer Neg Hx     BRCA1 Positive Neg Hx     Colon cancer Neg Hx     Breast cancer additional onset Neg Hx       reports that she has quit smoking. She has never used smokeless tobacco. She reports that she does not currently use alcohol. She reports current drug use. Drug: Marijuana.  Current Outpatient Medications   Medication Instructions    benzonatate (TESSALON PERLES) 100 mg capsule TAKE 2 CAPSULES BY MOUTH EVERY 8 HOURS AS NEEDED FOR COUGH    Calcium 500-125 MG-UNIT TABS 600 mg    CARAFATE 1 GM/10ML suspension 10 MILLILITER BEFORE MEALS AND AT BEDTIME    chlorthalidone 12.5 mg, Daily    Cholecalciferol (VITAMIN D3) 1000 units CAPS Take by mouth    [employer] Vitamin B-12 1,200 mcg, Daily    estradiol (ESTRACE) 0.1 mg/g vaginal cream INSERT 1 GRAM VAGINALLY  WEEKLY    famotidine (Pepcid) 20 mg tablet Every 12 hours    fluticasone (FLONASE) 50 mcg/act nasal spray 2 SPRAYS INTO EACH NOSTRIL DAILY.    hydrocortisone 2.5 % cream APPLY TO AFFECTED AREAS ON CHEST (AVOID FACE/SKIN FOLDS) TWICE A DAY FOR TWO WEEKS. USE ONLY AS  "NEEDED WITH FLARES.    lidocaine (Xylocaine) 1 % Take 1 mL by injection route.    Multiple Vitamins-Minerals (Multivitamin Women) TABS Oral    multivitamin (THERAGRAN) TABS 1 tablet, Daily    nitrofurantoin (MACROBID) 100 mg, Oral, 2 times daily    Omega-3 Fatty Acids (FISH OIL PO) 2,400 mcg, 3 times daily    ONETOUCH DELICA LANCETS 33G MISC TEST 3 X DAILY    OneTouch Ultra test strip TEST BLOOD GLUCOSE DAILY    PROAIR  (90 Base) MCG/ACT inhaler 1 puff, Every 6 hours PRN    REPLENS VAGINAL MOISTURIZER VA Insert into the vagina    Sodium Hyaluronate, oral, (HYALURONIC ACID PO) Take by mouth    traMADol (ULTRAM) 50 mg, Oral, Every 8 hours PRN    vitamin E (tocopherol) 400 Units, Daily     Allergies   Allergen Reactions    Pantoprazole Anaphylaxis and Other (See Comments)     Throat closing; trouble swallowing    Sucralfate Anaphylaxis and Other (See Comments)     Throat closing; trouble swallowing    Ibuprofen     Penicillins Hives    Tropicamide Other (See Comments)     Blurry vision    Bupropion Rash    Medical Tape Hives, Itching and Rash           Objective   /72 (BP Location: Left arm, Patient Position: Sitting, Cuff Size: Standard)   Pulse 65   Temp (!) 97.3 °F (36.3 °C) (Temporal)   Ht 5' 6\" (1.676 m)   SpO2 98%   BMI 26.79 kg/m²     Pain Screening:  Pain Score: 0-No pain (L breast if you bump it or press on it, it hurts)  ECOG    Physical Exam  Constitutional:       General: She is not in acute distress.     Appearance: Normal appearance.   HENT:      Head: Normocephalic and atraumatic.   Cardiovascular:      Rate and Rhythm: Regular rhythm.      Heart sounds: Normal heart sounds.   Pulmonary:      Breath sounds: Normal breath sounds.   Chest:   Breasts:     Right: No swelling, bleeding, inverted nipple, mass, nipple discharge, skin change or tenderness.      Left: Swelling, skin change (Resolving ecchymosis of the left breast from the recent MRI guided biopsy) and tenderness present. No " bleeding, inverted nipple, mass or nipple discharge.   Musculoskeletal:      Right lower leg: No edema.      Left lower leg: No edema.   Lymphadenopathy:      Upper Body:      Right upper body: No supraclavicular, axillary or pectoral adenopathy.      Left upper body: No supraclavicular, axillary or pectoral adenopathy.   Neurological:      Mental Status: She is alert and oriented to person, place, and time.   Psychiatric:         Mood and Affect: Mood normal.          Labs: I have reviewed pertinent labs.     3/21/2025 MRI guided biopsy left breast retroareolar was benign and concordant    3/3/2025 genetic testing shows a variant of uncertain significance in APC but no mutations    Hospital Outpatient Visit on 03/21/2025   Component Date Value Ref Range Status    Case Report 03/21/2025    Final                    Value:Surgical Pathology Report                         Case: [id number]                                  Authorizing Provider:  Garima Christianson MD           Collected:           03/21/2025 0853              Ordering Location:     [facility]      Received:            03/21/2025 [address] [place] [address] MRI                                                                 Pathologist:           Chelsie Alvarado DO                                                     Specimen:    Breast, Left, Left sided mri guided breast biopsy 6 passes mass retroareolar               Final Diagnosis 03/21/2025    Final                    Value:A. Breast, Left (Retroareolar Mass), Biopsy:  - Benign breast tissue with sclerosing adenosis, fibrocystic changes, and microcalcifications. See Note.      Note 03/21/2025    Final                    Value:An focus of sclerosing adenosis shows a hint of associated fibroelastosis, raising the possibility of a tangentially-sectioned radial/complex sclerosing lesion. E-Cadherin and p120 immunohistochemical stains are positive in a membranous pattern,  "while Calponin and p63 highlight the presence of myoepithelial cells. The findings support the diagnosis.    Clinical and radiologic correlation recommended to ensure that the targeted lesional area was adequately sampled.      Additional Information 03/21/2025    Final                    Value:All reported additional testing was performed with appropriately reactive controls.  These tests were developed and their performance characteristics determined by [facility] or appropriate performing facility, though some tests may be performed on tissues which have not been validated for performance characteristics (such as staining performed on alcohol exposed cell blocks and decalcified tissues).  Results should be interpreted with caution and in the context of the patients’ clinical condition. These tests may not be cleared or approved by the U.S. Food and Drug Administration, though the FDA has determined that such clearance or approval is not necessary. These tests are used for clinical purposes and they should not be regarded as investigational or for research. This laboratory has been approved by CLIA 88, designated as a high-complexity laboratory and is qualified to perform these tests.      Gross Description 03/21/2025    Final                    Value:A. The specimen is received in formalin, labeled with the patient's name and hospital number, and is designated \" left-sided MRI-guided breast biopsy 6 passes, mass retroareolar\".  The specimen consists of 6 yellow fatty and friable tissue cores measuring less than 0.1-0.5 cm in diameter and ranging from 1.0-1.7 cm in length.  The specimen is entirely submitted between sponges, 3 cassettes.    Note: The estimated total formalin fixation time based upon information provided by the submitting clinician and the standard processing schedule is under 72 hours.  -Danelle Aguilar      Clinical Information 03/21/2025    Final                    " Value:Left sided mri guided breast biopsy 6 passes mass retroareolar   Appointment on 03/03/2025   Component Date Value Ref Range Status    Sodium 03/03/2025 141  135 - 147 mmol/L Final    Potassium 03/03/2025 3.8  3.5 - 5.3 mmol/L Final    Chloride 03/03/2025 102  96 - 108 mmol/L Final    CO2 03/03/2025 24  21 - 32 mmol/L Final    ANION GAP 03/03/2025 15 (H)  4 - 13 mmol/L Final    BUN 03/03/2025 19  5 - 25 mg/dL Final    Creatinine 03/03/2025 0.81  0.60 - 1.30 mg/dL Final    Standardized to IDMS reference method    Glucose 03/03/2025 111  65 - 140 mg/dL Final    If the patient is fasting, the ADA then defines impaired fasting glucose as > 100 mg/dL and diabetes as > or equal to 123 mg/dL.    Calcium 03/03/2025 10.0  8.4 - 10.2 mg/dL Final    eGFR 03/03/2025 74  ml/min/1.73sq m Final    GENETIC ANALYSIS OVERALL INTERPRET* 03/03/2025 NEGATIVE: No Clinically Significant Variants Detected   Final    INTERPRETATION 03/03/2025 See Notes   Final    No pathogenic mutations, variants of unknown significance, or gross deletions or duplications were detected.  Risk Estimate: low likelihood of variants in the genes analyzed contributing to this individual's clinical history.  Genetic counseling is a recommended option for all individuals undergoing genetic testing.  Genes Analyzed (13 total): FRANCE, BARD1, BRCA1, BRCA2, CDH1, CHEK2, NF1, PALB2, PTEN, RAD51C, RAD51D, STK11 and TP53 (sequencing and deletion/duplication).    GENES NOT REPORTED 03/03/2025 FRANCE,BARD1,CDH1,CHEK2,PALB2,PTEN,RAD51C,RAD51D,STK11,TP53,NF1,BRCA1,BRCA2   Final    GENETIC ANALYSIS OVERALL INTERPRET* 03/03/2025 Variants of Unknown Significance Detected   Final    INTERPRETATION 03/03/2025 See Notes   Final    Comment: No known clinically actionable alterations were detected.  Two variants of unknown significance were detected in the APC gene.  Risk Estimate: should be based on clinical and family history, as the clinical significance of this result is  unknown.  Genetic counseling is a recommended option for all individuals undergoing genetic testing.  This individual is heterozygous for the p.H668Y (c.2002C>T) and p.Z5114N (c.4441G>T) variants of unknown significance in the APC gene, which may or may not contribute to this individual's clinical history. Familial testing would be necessary to determine if the alterations in APC are on the same chromosome or on different chromosomes (in cis or trans). Refer to the supplementary pages for additional information on these variants. No additional pathogenic mutations, variants of unknown significance, or gross deletions or duplications were detected. Genes Analyzed (59 total): APC, FRANCE, BAP1, BARD1, BMPR1A, BRCA1, BRCA2, BRIP1, CDH1, CDK4, CDKN1B, CDKN2A,                            CHEK2, DICER1, FH, FLCN, KIF1B, MAX, MEN1, MET, MLH1, MSH2, MSH6, MUTYH, NF1, NTHL1, PALB2, PMS2, POT1, PTEN, RAD51C, RAD51D, RB1, RET, SDHA, SDHAF2, SDHB, SDHC, SDHD, SMAD4, SMARCA4, STK11, QAOO058, TP53, TSC1, TSC2 and VHL (sequencing and deletion/duplication); AXIN2, CTNNA1, EGLN1, HOXB13, KIT, MITF, MSH3, PDGFRA, POLD1 and POLE (sequencing only); EPCAM and GREM1 (deletion/duplication only). RNA data is routinely analyzed for use in variant interpretation for all genes.    GENES NOT REPORTED 03/03/2025    Final                    Value:FRANCE,AXIN2,BAP1,BARD1,BMPR1A,BRCA1,BRCA2,BRIP1,CDH1,CDK4,CDKN1B,CDKN2A,CHEK2,CTNNA1,DICER1,EGLN1,EPCAM,FH,FLCN,GREM1,HOXB13,KIF1B,KIT,MAX,MEN1,MET,MITF,MLH1,MSH2,MSH3,MSH6,MUTYH,NF1,NTHL1,PALB2,PDGFRA,PMS2,POLD1,POLE,POT1,PTEN,RAD51C,RAD51D,RB1,RET,SDHA,S  DHAF2,SDHB,SDHC,SDHD,SMAD4,SMARCA4,STK11,OYZB161,TP53,TSC1,TSC2,VHL     Pathology: I have reviewed pathology reports described above.    Radiology Results Review: I personally reviewed the following image studies in PACS and associated radiology reports: 3/21/25 mri guided biopsy, 2/19/25 postbiopsy mammo right breast, 2/14/2025 bilateral postbiopsy  mammogram . My interpretation of the radiology images/reports is: Multifocal DCIS of the right breast with calcifications spanning 4.4 cm, atypical lobular hyperplasia 11:00 left breast, standard clips in place.  [place]: yes

## 2025-03-26 NOTE — TELEPHONE ENCOUNTER
Returned patient's call to discuss.    Discussed importance of pre-operative testing prior to surgery, as it is for her own safety. Also discussed that ANGEL clips can stay in indefinitely, but will be removed during surgery.     Also discussed that patient does need ride home from the hospital from a trusted individual after surgery, due to anesthesia.    No further questions or concerns at this time.    no

## 2025-03-26 NOTE — TELEPHONE ENCOUNTER
Patient calling back for the nurse she spoke with today at Dr Christianson's office, did not want to speak to anyone else. Stated she wanted to talk about a few things and was willing to wait on her call back. Tripp #[phone]. Please call her back

## 2025-03-26 NOTE — PROGRESS NOTES
Call placed to patient regarding recommendation for;    __X_2__ RIGHT ___1X___LEFT      __X___SAVI  placement.    Procedure explained to patient, additional questions answered at this time    __X___Verbalized understanding.      Blood thinners:  _____yes __X___no    Date stopped: ___N/A____    All teaching points discussed during call, pt with no questions at this time, pt adv to arrive at 1300 for 1330 insertion    Pt given name/# for any further questions/needs

## 2025-03-27 ENCOUNTER — PATIENT OUTREACH (OUTPATIENT)
Dept: HEMATOLOGY ONCOLOGY | Facility: CLINIC | Age: 70
End: 2025-03-27

## 2025-03-27 ENCOUNTER — TELEPHONE (OUTPATIENT)
Dept: SURGICAL ONCOLOGY | Facility: CLINIC | Age: 70
End: 2025-03-27

## 2025-03-27 NOTE — PROGRESS NOTES
"It was brought to my attention that patient was asking for transportation to her 4/11/25 appointment at the [facility].    Per Geremias management from [employer] transport patient does not qualify for [facility] at this time.     \"Since the patient drives she does not qualify for [facility] at this point. Our staff do not tell office patients are approved unless they are vetted and flagged in our system which she is not. [facility] has paid for her twice already to use [employer] so they may confused on the difference of the 2 services.\"    Additionally Geremias mentioned: \"We can re-evaluate eligibility if she goes on a treatment plan that would have her driving daily or numerous time a week so it lessens her burden but right now since she drives transportation is not a barrier on her care\"    [facility] plans on setting patient up with christopher kaye for her 4/11 appointment.   "

## 2025-03-27 NOTE — TELEPHONE ENCOUNTER
Call placed to [facility] to request most recent EKG done in office at patient's cardiology appointment with Dr. Armijo on 03/19/2025 be faxed to office in preparation for surgery with Dr. Christianson on 04/22/2025.    [employer] number provided. Per Kush, EKG will be faxed to office today.

## 2025-04-03 ENCOUNTER — APPOINTMENT (OUTPATIENT)
Dept: LAB | Facility: MEDICAL CENTER | Age: 70
End: 2025-04-03
Payer: MEDICARE

## 2025-04-03 ENCOUNTER — APPOINTMENT (OUTPATIENT)
Dept: RADIOLOGY | Facility: MEDICAL CENTER | Age: 70
End: 2025-04-03
Payer: MEDICARE

## 2025-04-03 DIAGNOSIS — D05.11 DUCTAL CARCINOMA IN SITU (DCIS) OF RIGHT BREAST: ICD-10-CM

## 2025-04-03 LAB
ALBUMIN SERPL BCG-MCNC: 4.5 G/DL (ref 3.5–5)
ALP SERPL-CCNC: 77 U/L (ref 34–104)
ALT SERPL W P-5'-P-CCNC: 24 U/L (ref 7–52)
ANION GAP SERPL CALCULATED.3IONS-SCNC: 11 MMOL/L (ref 4–13)
AST SERPL W P-5'-P-CCNC: 20 U/L (ref 13–39)
BACTERIA UR QL AUTO: ABNORMAL /HPF
BASOPHILS # BLD AUTO: 0.05 THOUSANDS/ÂΜL (ref 0–0.1)
BASOPHILS NFR BLD AUTO: 1 % (ref 0–1)
BILIRUB SERPL-MCNC: 0.5 MG/DL (ref 0.2–1)
BILIRUB UR QL STRIP: NEGATIVE
BUN SERPL-MCNC: 17 MG/DL (ref 5–25)
CALCIUM SERPL-MCNC: 9.2 MG/DL (ref 8.4–10.2)
CHLORIDE SERPL-SCNC: 103 MMOL/L (ref 96–108)
CLARITY UR: CLEAR
CO2 SERPL-SCNC: 28 MMOL/L (ref 21–32)
COLOR UR: ABNORMAL
CREAT SERPL-MCNC: 0.7 MG/DL (ref 0.6–1.3)
EOSINOPHIL # BLD AUTO: 0.13 THOUSAND/ÂΜL (ref 0–0.61)
EOSINOPHIL NFR BLD AUTO: 2 % (ref 0–6)
ERYTHROCYTE [DISTWIDTH] IN BLOOD BY AUTOMATED COUNT: 12.9 % (ref 11.6–15.1)
GFR SERPL CREATININE-BSD FRML MDRD: 88 ML/MIN/1.73SQ M
GLUCOSE P FAST SERPL-MCNC: 117 MG/DL (ref 65–99)
GLUCOSE UR STRIP-MCNC: NEGATIVE MG/DL
HCT VFR BLD AUTO: 45.1 % (ref 34.8–46.1)
HGB BLD-MCNC: 14.4 G/DL (ref 11.5–15.4)
HGB UR QL STRIP.AUTO: ABNORMAL
IMM GRANULOCYTES # BLD AUTO: 0.03 THOUSAND/UL (ref 0–0.2)
IMM GRANULOCYTES NFR BLD AUTO: 0 % (ref 0–2)
KETONES UR STRIP-MCNC: NEGATIVE MG/DL
LEUKOCYTE ESTERASE UR QL STRIP: ABNORMAL
LYMPHOCYTES # BLD AUTO: 3.22 THOUSANDS/ÂΜL (ref 0.6–4.47)
LYMPHOCYTES NFR BLD AUTO: 39 % (ref 14–44)
MCH RBC QN AUTO: 31.4 PG (ref 26.8–34.3)
MCHC RBC AUTO-ENTMCNC: 31.9 G/DL (ref 31.4–37.4)
MCV RBC AUTO: 99 FL (ref 82–98)
MONOCYTES # BLD AUTO: 0.48 THOUSAND/ÂΜL (ref 0.17–1.22)
MONOCYTES NFR BLD AUTO: 6 % (ref 4–12)
NEUTROPHILS # BLD AUTO: 4.31 THOUSANDS/ÂΜL (ref 1.85–7.62)
NEUTS SEG NFR BLD AUTO: 52 % (ref 43–75)
NITRITE UR QL STRIP: POSITIVE
NON-SQ EPI CELLS URNS QL MICRO: ABNORMAL /HPF
NRBC BLD AUTO-RTO: 0 /100 WBCS
PH UR STRIP.AUTO: 6 [PH]
PLATELET # BLD AUTO: 287 THOUSANDS/UL (ref 149–390)
PMV BLD AUTO: 11.4 FL (ref 8.9–12.7)
POTASSIUM SERPL-SCNC: 3.9 MMOL/L (ref 3.5–5.3)
PROT SERPL-MCNC: 7.4 G/DL (ref 6.4–8.4)
PROT UR STRIP-MCNC: NEGATIVE MG/DL
RBC # BLD AUTO: 4.58 MILLION/UL (ref 3.81–5.12)
RBC #/AREA URNS AUTO: ABNORMAL /HPF
SODIUM SERPL-SCNC: 142 MMOL/L (ref 135–147)
SP GR UR STRIP.AUTO: 1.01 (ref 1–1.03)
UROBILINOGEN UR STRIP-ACNC: <2 MG/DL
WBC # BLD AUTO: 8.22 THOUSAND/UL (ref 4.31–10.16)
WBC #/AREA URNS AUTO: ABNORMAL /HPF

## 2025-04-03 PROCEDURE — 80053 COMPREHEN METABOLIC PANEL: CPT

## 2025-04-03 PROCEDURE — 71046 X-RAY EXAM CHEST 2 VIEWS: CPT

## 2025-04-03 PROCEDURE — 85025 COMPLETE CBC W/AUTO DIFF WBC: CPT

## 2025-04-03 PROCEDURE — 87086 URINE CULTURE/COLONY COUNT: CPT

## 2025-04-03 PROCEDURE — 36415 COLL VENOUS BLD VENIPUNCTURE: CPT

## 2025-04-03 PROCEDURE — 87077 CULTURE AEROBIC IDENTIFY: CPT

## 2025-04-03 PROCEDURE — 81001 URINALYSIS AUTO W/SCOPE: CPT

## 2025-04-03 PROCEDURE — 87186 SC STD MICRODIL/AGAR DIL: CPT

## 2025-04-04 ENCOUNTER — TELEPHONE (OUTPATIENT)
Age: 70
End: 2025-04-04

## 2025-04-04 NOTE — TELEPHONE ENCOUNTER
Pt called in and stated that she had a bunch of pre-admission testing done & some of it got flagged.  She is very upset & would like to speak with Dr Christianson or a nurse asap

## 2025-04-05 ENCOUNTER — DOCUMENTATION (OUTPATIENT)
Dept: SURGICAL ONCOLOGY | Facility: CLINIC | Age: 70
End: 2025-04-05

## 2025-04-05 ENCOUNTER — TELEPHONE (OUTPATIENT)
Dept: OTHER | Facility: OTHER | Age: 70
End: 2025-04-05

## 2025-04-05 ENCOUNTER — TELEPHONE (OUTPATIENT)
Dept: SURGICAL ONCOLOGY | Facility: CLINIC | Age: 70
End: 2025-04-05

## 2025-04-05 ENCOUNTER — PATIENT MESSAGE (OUTPATIENT)
Dept: GYNECOLOGY | Facility: CLINIC | Age: 70
End: 2025-04-05

## 2025-04-05 DIAGNOSIS — N39.0 URINARY TRACT INFECTION WITHOUT HEMATURIA, SITE UNSPECIFIED: Primary | ICD-10-CM

## 2025-04-05 LAB — BACTERIA UR CULT: ABNORMAL

## 2025-04-05 RX ORDER — LEVOFLOXACIN 500 MG/1
500 TABLET, FILM COATED ORAL EVERY 24 HOURS
Qty: 3 TABLET | Refills: 0 | Status: SHIPPED | OUTPATIENT
Start: 2025-04-05 | End: 2025-04-08

## 2025-04-05 NOTE — TELEPHONE ENCOUNTER
Patient contacted Nyasia on 4/5/2025 at approximately 9:15 AM.  Call was immediately returned.  Patient had a UA and culture sent by breast surgical oncology as preoperative testing.  Patient recently had a UTI for which was treated with Macrobid.  Patient noted that her MyChart indicated that she has continued UTI.  She is currently symptomatic with urinary urgency and frequency.  In review of her cultures, her E. coli is susceptible to levofloxacin.  Patient has follow-up with her gynecologist Monday to to evaluate her ongoing vaginal dryness and yeast infection concerns.  Levofloxacin 500 mg daily for 3 days was sent to her pharmacy.  I called patient back at approximately 9:50 AM and alerted her that her prescription has been sent to her pharmacy.  I also advised patient to hold her multivitamin and calcium during the time while she is taking her levofloxacin as they can interact.  Patient has follow-up on Monday with her gynecologist I emphasized the importance of continuing this care.       Urine Culture >100,000 cfu/ml Escherichia coli Abnormal                    Escherichia coli       BRITTANY     Ampicillin ($$) <=8.00 ug/ml Susceptible     Aztreonam ($$$) <=4 ug/ml Susceptible     Cefazolin ($) <=2.00 ug/ml Susceptible     Ciprofloxacin ($) <=0.25 ug/ml Susceptible     Gentamicin ($$) <=2 ug/ml Susceptible     Levofloxacin ($) <=0.50 ug/ml Susceptible     Nitrofurantoin <=32 ug/ml Susceptible     Tetracycline <=4 ug/ml Susceptible     Trimethoprim + Sulfamethoxazole ($$$) <=0.5/9.5 u... Susceptible     ZID Performed Yes         Cassandra L. Cardarelli MD [facility]   Breast Surgical Oncology   Attending [facility]

## 2025-04-05 NOTE — TELEPHONE ENCOUNTER
Pt called stating her results came back that she has an infection and would like something called in.    On call notified via epic chat

## 2025-04-06 ENCOUNTER — TELEPHONE (OUTPATIENT)
Dept: SURGICAL ONCOLOGY | Facility: CLINIC | Age: 70
End: 2025-04-06

## 2025-04-06 ENCOUNTER — TELEPHONE (OUTPATIENT)
Dept: OTHER | Facility: OTHER | Age: 70
End: 2025-04-06

## 2025-04-06 NOTE — TELEPHONE ENCOUNTER
"Patient contacted the helpline on 4/6/2025 at approximately 1730.  Call was immediately returned.  Patient was spoken with yesterday as well on 4/5/2025 regarding urine culture results. This evening she states there is a \"wire\" coming from my breasts after biopsy. I reviewed her recent MRI biopsy in which triple twist marker was placed.  Is unclear over the phone what wire might be being extruded from the breast.  While it is highly unlikely that a marker would be extruded through the biopsy tract it is certainly possible.  Patient was offered examination in the [place] emergency room for which she declined.  I okayed patient to wear a bra overnight.  Patient had no systemic illness including fever, chills, or chest pain.  Patient requesting to be seen in clinic tomorrow.  We will contact patient at approximately 9 AM tomorrow for possible evaluation in the nurse practitioner clinic.  Patient has contact information should any further concerns arise this evening.  She has also contacted her gynecologist regarding her vaginal symptoms.  She is tolerating her Levaquin well for her UTI.  All questions and concerns were addressed to the best of my abilities over the phone.  Patient was very appreciative the phone call.  "

## 2025-04-06 NOTE — TELEPHONE ENCOUNTER
"Pt stated, \"My breast is red and I have a wire coming from my breast from having multiple biopsies. I am in a lot of pain.\"    Contacted on call provider via secure chat  "

## 2025-04-07 ENCOUNTER — OFFICE VISIT (OUTPATIENT)
Dept: SURGICAL ONCOLOGY | Facility: CLINIC | Age: 70
End: 2025-04-07
Payer: MEDICARE

## 2025-04-07 ENCOUNTER — TELEPHONE (OUTPATIENT)
Age: 70
End: 2025-04-07

## 2025-04-07 VITALS
HEART RATE: 88 BPM | OXYGEN SATURATION: 97 % | DIASTOLIC BLOOD PRESSURE: 80 MMHG | RESPIRATION RATE: 16 BRPM | TEMPERATURE: 98.8 F | WEIGHT: 168 LBS | SYSTOLIC BLOOD PRESSURE: 118 MMHG | BODY MASS INDEX: 27 KG/M2 | HEIGHT: 66 IN

## 2025-04-07 DIAGNOSIS — N64.4 BREAST PAIN, LEFT: Primary | ICD-10-CM

## 2025-04-07 PROCEDURE — 99213 OFFICE O/P EST LOW 20 MIN: CPT | Performed by: NURSE PRACTITIONER

## 2025-04-07 NOTE — TELEPHONE ENCOUNTER
Called patient to offer appointment with [facility] in [place] office today at 1pm for assessment. Patient agreeable.

## 2025-04-07 NOTE — PROGRESS NOTES
Name: Jessica Cartagena      : 1955      MRN: [id number]  Encounter Provider: TARSHA Meléndez  Encounter Date: 2025   Encounter department: CANCER [facility] SURGICAL ONCOLOGY [place]  :  Assessment & Plan  Breast pain, left             Patient is a 69-year-old female that was recently diagnosed with left breast atypical lobular hyperplasia and right breast DCIS.  She is scheduled for a right bracketed lumpectomy and a left breast excisional biopsy with Dr. Christianson on 2025. She underwent an MRI guided biopsy of the left breast on 3/21/25. She reports ongoing sharp pains and tenderness of the left breast. Three days ago she noticed something protruding from the biopsy site. She reports it is causing her a tremendous amount of pain. Clinical exam reveals a tiny scab at the biopsy site which was removed today. No other abnormalities noted. No evidence of infection or hematoma. I encouraged her to wear a supportive bra like a sports bra to help with the discomfort. She was f/u as previously scheduled but will call with other concerns in the interim.       History of Present Illness   Jessica Cartagena is a 69 y.o. year old female who presents today after noticing something protruding from her biopsy site 3 days ago. She reports associated pain and discomfort. She believes the area was initially more reddened but seemed to improve when she took Levaquin for her UTI. She reports drinking a significant amount of water so she is urinating more frequently but denies dysuria, hematuria and states urgency has improved after antibiotics.    Oncology History   Cancer Staging   Ductal carcinoma in situ (DCIS) of right breast  Staging form: Breast, AJCC 8th Edition  - Clinical stage from 2025: Stage 0 (cTis (DCIS), cN0, cM0, ER+, NH+, HER2: Not Assessed) - Signed by Garima Christianson MD on 3/10/2025  Stage prefix: Initial diagnosis  Method of lymph node assessment: Clinical  Nuclear grade:  G2  Oncology History   Ductal carcinoma in situ (DCIS) of right breast   2/14/2025 Biopsy    B/L US-guided Bx    A. Left 11:00 periareolar  Breast tissue with dense stromal fibrosis and focal ALH    B. Left axillary lymph node  Benign fibroadipose tissue with minute focus of lymphocytes; definitive lymph node tissue not identified. Multiple additional levels were evaluated.    C. Right 9:00 retroareolar  Breast tissue with fibrocystic changes.    D. Right 10:00 periareolar  Breast tissue with fibrocystic changes and microcalcifications.     2/19/2025 Biopsy    Right stereo bx, multi site:    A. 1:00, With Calcs  DCIS & LCIS adjacent to and involving a sclerosing lesion  Grade 2  ER 95, GA 80    B. 1:00, W/O Calcs  DCIS & LCIS adjacent to and involving a sclerosing lesion    C. 12:00, With Calcs  Fibroadenoma and sclerosing changes; microcalcifications present in fibroadenoma     D. 12:00 W/O Calcs  Benign breast parenchyma, without atypia, including apocrine metaplasia, sclerosing changes, duct dilatation. No evidence of malignancy.       2/19/2025 -  Cancer Staged    Staging form: Breast, AJCC 8th Edition  - Clinical stage from 2/19/2025: Stage 0 (cTis (DCIS), cN0, cM0, ER+, GA+, HER2: Not Assessed) - Signed by Garima Christianson MD on 3/10/2025  Stage prefix: Initial diagnosis  Method of lymph node assessment: Clinical  Nuclear grade: G2       3/3/2025 Genetic Testing    [facility] BRCAplus and Cancer +RNA[facility]ight    VUS of APC     3/21/2025 Biopsy    Left MRI-guided bx    Benign breast tissue with sclerosing adenosis, fibrocystic changes, and microcalcifications.         Review of Systems   Constitutional:  Negative for chills, fatigue and fever.   Respiratory:  Negative for cough and shortness of breath.    Cardiovascular:  Negative for chest pain.   Genitourinary:  Negative for dysuria, hematuria and urgency (improved with abx).   Hematological:  Negative for adenopathy.   Psychiatric/Behavioral:  Negative for confusion.  "The patient is nervous/anxious.     A complete review of systems is negative other than that noted above in the HPI.           Objective   /80 (Patient Position: Sitting, Cuff Size: Large)   Pulse 88   Temp 98.8 °F (37.1 °C) (Temporal)   Resp 16   Ht 5' 6\" (1.676 m)   Wt 76.2 kg (168 lb)   SpO2 97%   BMI 27.12 kg/m²     Pain Screening:  Pain Score:   8 (Patient is experiencing breast pain at the biopsy site.)  ECOG    Physical Exam  Vitals reviewed.   Constitutional:       Appearance: She is normal weight.   Pulmonary:      Effort: Pulmonary effort is normal.   Chest:   Breasts:     Left: Skin change and tenderness present. No swelling, bleeding, inverted nipple, mass or nipple discharge.      Comments: Left breast biopsy site is well healed with tiny scab at biopsy insertion site. This was removed and topical antibiotic ointment, gauze and tape dressing was applied.   Neurological:      General: No focal deficit present.      Mental Status: She is alert.   Psychiatric:         Mood and Affect: Mood is anxious.            "

## 2025-04-07 NOTE — TELEPHONE ENCOUNTER
Patient states she was treated for UTI infection with Levaquin, asking if she needs any follow up urine testing before surgery 4/22?  Off Estrogen is having vaginal dryness and asked for appointment prior to surgery date with Dr. Franklin, several appointment dates offered, appointment scheduled 4/17 per patient preference.  Inbasket sent to provider to review.

## 2025-04-11 ENCOUNTER — HOSPITAL ENCOUNTER (OUTPATIENT)
Dept: MAMMOGRAPHY | Facility: CLINIC | Age: 70
Discharge: HOME | End: 2025-04-11
Payer: MEDICARE

## 2025-04-11 ENCOUNTER — HOSPITAL ENCOUNTER (OUTPATIENT)
Dept: ULTRASOUND IMAGING | Facility: CLINIC | Age: 70
Discharge: HOME | End: 2025-04-11
Payer: MEDICARE

## 2025-04-11 VITALS — SYSTOLIC BLOOD PRESSURE: 121 MMHG | DIASTOLIC BLOOD PRESSURE: 75 MMHG | HEART RATE: 68 BPM

## 2025-04-11 DIAGNOSIS — N60.92 ATYPICAL LOBULAR HYPERPLASIA (ALH) OF LEFT BREAST: ICD-10-CM

## 2025-04-11 DIAGNOSIS — R92.8 ABNORMAL MAMMOGRAM: ICD-10-CM

## 2025-04-11 DIAGNOSIS — D05.11 DUCTAL CARCINOMA IN SITU (DCIS) OF RIGHT BREAST: ICD-10-CM

## 2025-04-11 PROCEDURE — 19281 PERQ DEVICE BREAST 1ST IMAG: CPT

## 2025-04-11 PROCEDURE — 19282 PERQ DEVICE BREAST EA IMAG: CPT

## 2025-04-11 PROCEDURE — G0279 TOMOSYNTHESIS, MAMMO: HCPCS

## 2025-04-11 PROCEDURE — 77066 DX MAMMO INCL CAD BI: CPT

## 2025-04-11 PROCEDURE — 19285 PERQ DEV BREAST 1ST US IMAG: CPT

## 2025-04-11 RX ORDER — LIDOCAINE HYDROCHLORIDE 10 MG/ML
5 INJECTION, SOLUTION EPIDURAL; INFILTRATION; INTRACAUDAL; PERINEURAL ONCE
Status: COMPLETED | OUTPATIENT
Start: 2025-04-11 | End: 2025-04-11

## 2025-04-11 RX ADMIN — LIDOCAINE HYDROCHLORIDE 5 ML: 10 INJECTION, SOLUTION EPIDURAL; INFILTRATION; INTRACAUDAL; PERINEURAL at 14:12

## 2025-04-11 RX ADMIN — LIDOCAINE HYDROCHLORIDE 15 ML: 10 INJECTION, SOLUTION EPIDURAL; INFILTRATION; INTRACAUDAL; PERINEURAL at 13:59

## 2025-04-11 NOTE — PROGRESS NOTES
Procedure type: Clip PLacement    __x___ultrasound guided _____stereotactic    Breast:    __x___Left _____Right    Location: 11 o'clock Periareolar    Needle: 16G    # of passes: 1    Clip: Debra    Performed by: Dr. Mcallister    Pressure held for 2 minutes by:Suyapa Martinez Strips:    ____yes __x___no per patient    Band aid:    ____yes___x__no per patient    Tolerated procedure: 4x4 guaze given to cover area    __X___yes _____no

## 2025-04-11 NOTE — PROGRESS NOTES
Ice pack given:    __X___yes _____no    Discharge instructions reviewed and given to patient:    __X___yes _____no    Discharged via:    __X___amulatory    _____wheelchair    _____stretcher    Clip site clean and dry with no bleeding on discharge:    __X___yes ____no

## 2025-04-11 NOTE — PROGRESS NOTES
Procedure type: Clip # 1    _____ultrasound guided ___x__mammo guided    Breast:    _____Left ___x__Right    Location: 1 o'clock    Needle: 16G    # of passes:1    Clip: Debra    Procedure type: Clip # 2    _____ultrasound guided ___x__mammo guided    Breast:    _____Left __x___Right    Location: 12 o'clock    Needle: 16G    # of passes: 1    Clip: Debra    Performed by: Dr. Mcallister    Pressure held for 2 minutes by: Suyapa Martinez Strips:    _____yes ___x__no per patient     Band aid:    ____yes__x___no per patient     Tolerated procedure: 4x4 Guaze  to cover area    __X___yes _____no

## 2025-04-14 NOTE — PROGRESS NOTES
Post procedure call completed    Bleeding: _____yes ___x__no    Pain: _____yes __x____no    Redness/Swelling: ______yes ___x___no    Band aid removed: _____yes ___x__none applied    Steri-Strips intact: ______yes ___x__none applied    Gauze pad had  been tucked in bra due to severe allergy to adhesive.

## 2025-04-17 ENCOUNTER — TELEPHONE (OUTPATIENT)
Dept: GYNECOLOGY | Facility: CLINIC | Age: 70
End: 2025-04-17

## 2025-04-17 ENCOUNTER — TELEPHONE (OUTPATIENT)
Age: 70
End: 2025-04-17

## 2025-04-17 ENCOUNTER — OFFICE VISIT (OUTPATIENT)
Dept: GYNECOLOGY | Facility: CLINIC | Age: 70
End: 2025-04-17
Payer: MEDICARE

## 2025-04-17 VITALS — DIASTOLIC BLOOD PRESSURE: 70 MMHG | SYSTOLIC BLOOD PRESSURE: 110 MMHG | HEART RATE: 75 BPM

## 2025-04-17 DIAGNOSIS — B37.9 CANDIDIASIS: ICD-10-CM

## 2025-04-17 DIAGNOSIS — N95.2 ATROPHIC VAGINITIS: ICD-10-CM

## 2025-04-17 DIAGNOSIS — R39.9 UTI SYMPTOMS: Primary | ICD-10-CM

## 2025-04-17 LAB
SL AMB  POCT GLUCOSE, UA: ABNORMAL
SL AMB LEUKOCYTE ESTERASE,UA: ABNORMAL
SL AMB POCT BILIRUBIN,UA: ABNORMAL
SL AMB POCT BLOOD,UA: ABNORMAL
SL AMB POCT CLARITY,UA: ABNORMAL
SL AMB POCT COLOR,UA: YELLOW
SL AMB POCT KETONES,UA: ABNORMAL
SL AMB POCT NITRITE,UA: ABNORMAL
SL AMB POCT PH,UA: 6
SL AMB POCT SPECIFIC GRAVITY,UA: 1.01
SL AMB POCT URINE PROTEIN: ABNORMAL
SL AMB POCT UROBILINOGEN: 0.2

## 2025-04-17 PROCEDURE — 81002 URINALYSIS NONAUTO W/O SCOPE: CPT | Performed by: OBSTETRICS & GYNECOLOGY

## 2025-04-17 PROCEDURE — 99213 OFFICE O/P EST LOW 20 MIN: CPT | Performed by: OBSTETRICS & GYNECOLOGY

## 2025-04-17 RX ORDER — FLUCONAZOLE 150 MG/1
TABLET ORAL
Qty: 2 TABLET | Refills: 1 | Status: SHIPPED | OUTPATIENT
Start: 2025-04-17 | End: 2025-04-19

## 2025-04-17 RX ORDER — NITROFURANTOIN 25; 75 MG/1; MG/1
100 CAPSULE ORAL 2 TIMES DAILY
Qty: 14 CAPSULE | Refills: 0 | Status: SHIPPED | OUTPATIENT
Start: 2025-04-17 | End: 2025-04-24

## 2025-04-17 NOTE — PROGRESS NOTES
Name: Jessica Cartagena      : 1955      MRN: [id number]  Encounter Provider: Damien Franklin DO  Encounter Date: 2025   Encounter department: [facility]  :  Assessment & Plan  UTI symptoms    Orders:    UA w Reflex to Microscopic w Reflex to Culture; Future    POCT urine dip    nitrofurantoin (MACROBID) 100 mg capsule; Take 1 capsule (100 mg total) by mouth 2 (two) times a day for 7 days  Recheck urine analysis in 1 month.  If hematuria persists would recommend consultation with urology  Atrophic vaginitis  Discussed alternative options for treating atrophy.  She states that Revaree is too expensive.  She is on Replens.  We did discuss laser treatments       Candidiasis    Orders:    fluconazole (DIFLUCAN) 150 mg tablet; 1 tablet now; repeat in 48 hours        History of Present Illness   HPI  Jessica Cartagena is a 69 y.o. female who presents complaining of vaginal dryness.  Patient has a history of left atypical lobular hyperplasia and right DCIS.  She is going for a right lumpectomy and left excisional biopsy in approximately 1 week.  She is status post total abdominal hysterectomy.  She is presently off of vaginal estrogen.  She is experiencing vaginal dryness.  She was recently treated for a urinary tract infection with Macrobid.  She denies any vaginal discharge or bleeding.  Denies any dysuria or hematuria.      Review of Systems       Objective   /70 (BP Location: Left arm, Patient Position: Sitting, Cuff Size: Large)   Pulse 75      Physical Exam  Vitals reviewed.   Abdominal:      General: There is no distension.      Palpations: Abdomen is soft. There is no mass.      Tenderness: There is no abdominal tenderness. There is no guarding or rebound.      Hernia: No hernia is present. There is no hernia in the left inguinal area or right inguinal area.   Genitourinary:     General: Normal vulva.      Labia:         Right: No rash, tenderness or lesion.          Left: No rash, tenderness or lesion.       Comments: Uterus and cervix surgically absent.  Vaginal discharge present consistent with candidiasis.  Atrophic vaginal changes.  Bartholin's and Wassaic's glands normal.  Urethral orifice normal.  No cystocele or rectocele.    Urine analysis 1+ blood ; otherwise negative  Lymphadenopathy:      Lower Body: No right inguinal adenopathy. No left inguinal adenopathy.   Neurological:      Mental Status: She is alert.

## 2025-04-17 NOTE — TELEPHONE ENCOUNTER
Patient calling in expressing frustration over previous call getting disconnected. Patient states she would like to speak with someone physically in the office to ask what 2 doctors Dr. Franklin recommended for urology at today's appointment. Warm transferred to office staff for assistance.

## 2025-04-17 NOTE — TELEPHONE ENCOUNTER
Patient called again asking for doctors names given during exam today.  Gave her info for  Dr Duran office and also gave her phone number for urology office.

## 2025-04-18 RX ORDER — MOMETASONE FUROATE 1 MG/G
OINTMENT TOPICAL DAILY PRN
COMMUNITY

## 2025-04-18 RX ORDER — BUTYROSPERMUM PARKII(SHEA BUTTER), SIMMONDSIA CHINENSIS (JOJOBA) SEED OIL, ALOE BARBADENSIS LEAF EXTRACT .01; 1; 3.5 G/100G; G/100G; G/100G
29 LIQUID TOPICAL DAILY
COMMUNITY

## 2025-04-18 RX ORDER — SOY PROTEIN
POWDER (GRAM) ORAL DAILY
COMMUNITY

## 2025-04-18 RX ORDER — FERROUS SULFATE 325(65) MG
325 TABLET ORAL
COMMUNITY
End: 2025-04-21 | Stop reason: ALTCHOICE

## 2025-04-18 NOTE — PRE-PROCEDURE INSTRUCTIONS
Pre-Surgery Instructions:   Medication Instructions    Calcium 500-125 MG-UNIT TABS Stop taking 3 days prior to surgery.    chlorthalidone 25 mg tablet Hold day of surgery.    Cobalamin Combinations (Vitamin B12-Folic Acid) 500-400 MCG TABS Stop taking 3 days prior to surgery.    famotidine (Pepcid) 20 mg tablet Uses PRN- OK to take day of surgery    Fish Oil-Cholecalciferol (OMEGA-3 + VITAMIN D3 PO) LD was 4/14    fluticasone (FLONASE) 50 mcg/act nasal spray Take day of surgery.    Glycerin-Polysorbate 80 (REFRESH DRY EYE THERAPY OP) Take day of surgery.    IPRATROPIUM BROMIDE NA Take day of surgery.    Iron Glycinate 29 MG CAPS Hold day of surgery.    mometasone (ELOCON) 0.1 % ointment Hold day of surgery.    multivitamin (THERAGRAN) TABS Stop taking 3 days prior to surgery.    nitrofurantoin (MACROBID) 100 mg capsule Hold day of surgery.    Phytosterol Esters-Fish Oil 500-32 MG CAPS LD was 4/14    PROAIR  (90 Base) MCG/ACT inhaler Uses PRN- OK to take day of surgery    REPLENS VAGINAL MOISTURIZER VA Hold day of surgery.    Sodium Hyaluronate, oral, (HYALURONIC ACID PO) Stop taking 3 days prior to surgery.    traMADol (Ultram) 50 mg tablet Uses PRN- OK to take day of surgery    Vitamin D-Vitamin K (VITAMIN K2-VITAMIN D3 PO) Stop taking 3 days prior to surgery.    vitamin E, tocopherol, 400 units capsule Stop taking 3 days prior to surgery.   Medication instructions for day of surgery reviewed. Please take all instructed medications with only a sip of water.       You will receive a call one business day prior to surgery with an arrival time and hospital directions. If your surgery is scheduled on a Monday, the hospital will be calling you on the Friday prior to your surgery. If you have not heard from anyone by 8pm, please call the hospital supervisor through the hospital  at [phone]. ([place] [phone] or [place] [phone]).    Do not eat or drink anything after midnight the night  before your surgery, including candy, mints, lifesavers, or chewing gum. Do not drink alcohol 24hrs before your surgery. Try not to smoke at least 24hrs before your surgery.       Follow the pre surgery showering instructions as listed in the “My Surgical Experience Booklet” or otherwise provided by your surgeon's office. Do not use a blade to shave the surgical area 1 week before surgery. It is okay to use a clean electric clippers up to 24 hours before surgery. Do not apply any lotions, creams, including makeup, cologne, deodorant, or perfumes after showering on the day of your surgery. Do not use dry shampoo, hair spray, hair gel, or any type of hair products.     No contact lenses, eye make-up, or artificial eyelashes. Remove nail polish, including gel polish, and any artificial, gel, or acrylic nails if possible. Remove all jewelry including rings and body piercing jewelry.     Wear causal clothing that is easy to take on and off. Consider your type of surgery.    Keep any valuables, jewelry, piercings at home. Please bring any specially ordered equipment (sling, braces) if indicated.    Arrange for a responsible person to drive you to and from the hospital on the day of your surgery. Please confirm the visitor policy for the day of your procedure when you receive your phone call with an arrival time.     Call the surgeon's office with any new illnesses, exposures, or additional questions prior to surgery.    Please reference your “My Surgical Experience Booklet” for additional information to prepare for your upcoming surgery.

## 2025-04-21 ENCOUNTER — ANESTHESIA EVENT (OUTPATIENT)
Dept: PERIOP | Facility: HOSPITAL | Age: 70
End: 2025-04-21
Payer: MEDICARE

## 2025-04-21 RX ORDER — METHOCARBAMOL 750 MG/1
750 TABLET, FILM COATED ORAL EVERY 8 HOURS SCHEDULED
COMMUNITY

## 2025-04-21 RX ORDER — CLONAZEPAM 0.5 MG/1
0.25 TABLET ORAL
COMMUNITY

## 2025-04-22 ENCOUNTER — APPOINTMENT (OUTPATIENT)
Dept: MAMMOGRAPHY | Facility: HOSPITAL | Age: 70
End: 2025-04-22
Payer: MEDICARE

## 2025-04-22 ENCOUNTER — ANESTHESIA (OUTPATIENT)
Dept: PERIOP | Facility: HOSPITAL | Age: 70
End: 2025-04-22
Payer: MEDICARE

## 2025-04-22 ENCOUNTER — HOSPITAL ENCOUNTER (OUTPATIENT)
Facility: HOSPITAL | Age: 70
Setting detail: OUTPATIENT SURGERY
Discharge: HOME | End: 2025-04-22
Attending: SURGERY | Admitting: SURGERY
Payer: MEDICARE

## 2025-04-22 VITALS
SYSTOLIC BLOOD PRESSURE: 138 MMHG | DIASTOLIC BLOOD PRESSURE: 64 MMHG | HEIGHT: 66 IN | RESPIRATION RATE: 18 BRPM | WEIGHT: 170.64 LBS | BODY MASS INDEX: 27.42 KG/M2 | HEART RATE: 71 BPM | OXYGEN SATURATION: 96 % | TEMPERATURE: 97.7 F

## 2025-04-22 DIAGNOSIS — N60.92 ATYPICAL LOBULAR HYPERPLASIA (ALH) OF LEFT BREAST: ICD-10-CM

## 2025-04-22 DIAGNOSIS — D05.11 DUCTAL CARCINOMA IN SITU (DCIS) OF RIGHT BREAST: Primary | ICD-10-CM

## 2025-04-22 PROBLEM — J43.9 PULMONARY EMPHYSEMA (HCC): Status: ACTIVE | Noted: 2025-04-22

## 2025-04-22 PROCEDURE — 88360 TUMOR IMMUNOHISTOCHEM/MANUAL: CPT | Performed by: STUDENT IN AN ORGANIZED HEALTH CARE EDUCATION/TRAINING PROGRAM

## 2025-04-22 PROCEDURE — 88307 TISSUE EXAM BY PATHOLOGIST: CPT | Performed by: STUDENT IN AN ORGANIZED HEALTH CARE EDUCATION/TRAINING PROGRAM

## 2025-04-22 PROCEDURE — 88342 IMHCHEM/IMCYTCHM 1ST ANTB: CPT | Performed by: STUDENT IN AN ORGANIZED HEALTH CARE EDUCATION/TRAINING PROGRAM

## 2025-04-22 PROCEDURE — 19301 PARTIAL MASTECTOMY: CPT | Performed by: PHYSICIAN ASSISTANT

## 2025-04-22 PROCEDURE — 19301 PARTIAL MASTECTOMY: CPT | Performed by: SURGERY

## 2025-04-22 PROCEDURE — 88341 IMHCHEM/IMCYTCHM EA ADD ANTB: CPT | Performed by: STUDENT IN AN ORGANIZED HEALTH CARE EDUCATION/TRAINING PROGRAM

## 2025-04-22 RX ORDER — FENTANYL CITRATE 50 UG/ML
INJECTION, SOLUTION INTRAMUSCULAR; INTRAVENOUS AS NEEDED
Status: DISCONTINUED | OUTPATIENT
Start: 2025-04-22 | End: 2025-04-22

## 2025-04-22 RX ORDER — SODIUM CHLORIDE, SODIUM LACTATE, POTASSIUM CHLORIDE, CALCIUM CHLORIDE 600; 310; 30; 20 MG/100ML; MG/100ML; MG/100ML; MG/100ML
INJECTION, SOLUTION INTRAVENOUS CONTINUOUS PRN
Status: DISCONTINUED | OUTPATIENT
Start: 2025-04-22 | End: 2025-04-22

## 2025-04-22 RX ORDER — ONDANSETRON 2 MG/ML
4 INJECTION INTRAMUSCULAR; INTRAVENOUS ONCE AS NEEDED
Status: DISCONTINUED | OUTPATIENT
Start: 2025-04-22 | End: 2025-04-22 | Stop reason: HOSPADM

## 2025-04-22 RX ORDER — PROPOFOL 10 MG/ML
INJECTION, EMULSION INTRAVENOUS CONTINUOUS PRN
Status: DISCONTINUED | OUTPATIENT
Start: 2025-04-22 | End: 2025-04-22

## 2025-04-22 RX ORDER — VANCOMYCIN HYDROCHLORIDE 1 G/200ML
1000 INJECTION, SOLUTION INTRAVENOUS
Status: DISCONTINUED | OUTPATIENT
Start: 2025-04-22 | End: 2025-04-22 | Stop reason: HOSPADM

## 2025-04-22 RX ORDER — TRAMADOL HYDROCHLORIDE 50 MG/1
50 TABLET ORAL EVERY 6 HOURS PRN
Status: DISCONTINUED | OUTPATIENT
Start: 2025-04-22 | End: 2025-04-22 | Stop reason: HOSPADM

## 2025-04-22 RX ORDER — KETOROLAC TROMETHAMINE 30 MG/ML
INJECTION, SOLUTION INTRAMUSCULAR; INTRAVENOUS AS NEEDED
Status: DISCONTINUED | OUTPATIENT
Start: 2025-04-22 | End: 2025-04-22

## 2025-04-22 RX ORDER — FENTANYL CITRATE/PF 50 MCG/ML
50 SYRINGE (ML) INJECTION
Status: DISCONTINUED | OUTPATIENT
Start: 2025-04-22 | End: 2025-04-22 | Stop reason: HOSPADM

## 2025-04-22 RX ORDER — DEXAMETHASONE SODIUM PHOSPHATE 10 MG/ML
INJECTION, SOLUTION INTRAMUSCULAR; INTRAVENOUS AS NEEDED
Status: DISCONTINUED | OUTPATIENT
Start: 2025-04-22 | End: 2025-04-22

## 2025-04-22 RX ORDER — PROPOFOL 10 MG/ML
INJECTION, EMULSION INTRAVENOUS AS NEEDED
Status: DISCONTINUED | OUTPATIENT
Start: 2025-04-22 | End: 2025-04-22

## 2025-04-22 RX ORDER — LIDOCAINE HYDROCHLORIDE 20 MG/ML
INJECTION, SOLUTION EPIDURAL; INFILTRATION; INTRACAUDAL; PERINEURAL AS NEEDED
Status: DISCONTINUED | OUTPATIENT
Start: 2025-04-22 | End: 2025-04-22

## 2025-04-22 RX ORDER — ACETAMINOPHEN 500 MG
500 TABLET ORAL EVERY 6 HOURS PRN
COMMUNITY

## 2025-04-22 RX ORDER — PROMETHAZINE HYDROCHLORIDE 25 MG/ML
6.25 INJECTION, SOLUTION INTRAMUSCULAR; INTRAVENOUS ONCE AS NEEDED
Status: DISCONTINUED | OUTPATIENT
Start: 2025-04-22 | End: 2025-04-22 | Stop reason: HOSPADM

## 2025-04-22 RX ORDER — MAGNESIUM HYDROXIDE 1200 MG/15ML
LIQUID ORAL AS NEEDED
Status: DISCONTINUED | OUTPATIENT
Start: 2025-04-22 | End: 2025-04-22 | Stop reason: HOSPADM

## 2025-04-22 RX ORDER — ONDANSETRON 2 MG/ML
INJECTION INTRAMUSCULAR; INTRAVENOUS AS NEEDED
Status: DISCONTINUED | OUTPATIENT
Start: 2025-04-22 | End: 2025-04-22

## 2025-04-22 RX ORDER — MIDAZOLAM HYDROCHLORIDE 2 MG/2ML
INJECTION, SOLUTION INTRAMUSCULAR; INTRAVENOUS AS NEEDED
Status: DISCONTINUED | OUTPATIENT
Start: 2025-04-22 | End: 2025-04-22

## 2025-04-22 RX ORDER — BUPIVACAINE HYDROCHLORIDE 5 MG/ML
INJECTION, SOLUTION EPIDURAL; INTRACAUDAL; PERINEURAL AS NEEDED
Status: DISCONTINUED | OUTPATIENT
Start: 2025-04-22 | End: 2025-04-22 | Stop reason: HOSPADM

## 2025-04-22 RX ORDER — MEPERIDINE HYDROCHLORIDE 25 MG/ML
12.5 INJECTION INTRAMUSCULAR; INTRAVENOUS; SUBCUTANEOUS ONCE AS NEEDED
Status: DISCONTINUED | OUTPATIENT
Start: 2025-04-22 | End: 2025-04-22 | Stop reason: HOSPADM

## 2025-04-22 RX ORDER — HYDROMORPHONE HCL/PF 1 MG/ML
0.5 SYRINGE (ML) INJECTION
Status: DISCONTINUED | OUTPATIENT
Start: 2025-04-22 | End: 2025-04-22 | Stop reason: HOSPADM

## 2025-04-22 RX ORDER — SODIUM CHLORIDE, SODIUM LACTATE, POTASSIUM CHLORIDE, CALCIUM CHLORIDE 600; 310; 30; 20 MG/100ML; MG/100ML; MG/100ML; MG/100ML
125 INJECTION, SOLUTION INTRAVENOUS CONTINUOUS
Status: DISCONTINUED | OUTPATIENT
Start: 2025-04-22 | End: 2025-04-22 | Stop reason: HOSPADM

## 2025-04-22 RX ADMIN — PROPOFOL 200 MG: 10 INJECTION, EMULSION INTRAVENOUS at 07:40

## 2025-04-22 RX ADMIN — FENTANYL CITRATE 50 MCG: 50 INJECTION, SOLUTION INTRAMUSCULAR; INTRAVENOUS at 10:26

## 2025-04-22 RX ADMIN — PROPOFOL 110 MCG/KG/MIN: 10 INJECTION, EMULSION INTRAVENOUS at 07:46

## 2025-04-22 RX ADMIN — FENTANYL CITRATE 50 MCG: 50 INJECTION INTRAMUSCULAR; INTRAVENOUS at 07:40

## 2025-04-22 RX ADMIN — ONDANSETRON 4 MG: 2 INJECTION INTRAMUSCULAR; INTRAVENOUS at 09:41

## 2025-04-22 RX ADMIN — FENTANYL CITRATE 50 MCG: 50 INJECTION INTRAMUSCULAR; INTRAVENOUS at 09:25

## 2025-04-22 RX ADMIN — SODIUM CHLORIDE, SODIUM LACTATE, POTASSIUM CHLORIDE, AND CALCIUM CHLORIDE: .6; .31; .03; .02 INJECTION, SOLUTION INTRAVENOUS at 07:32

## 2025-04-22 RX ADMIN — FENTANYL CITRATE 50 MCG: 50 INJECTION, SOLUTION INTRAMUSCULAR; INTRAVENOUS at 10:12

## 2025-04-22 RX ADMIN — VANCOMYCIN HYDROCHLORIDE 1000 MG: 1 INJECTION, SOLUTION INTRAVENOUS at 06:55

## 2025-04-22 RX ADMIN — TRAMADOL HYDROCHLORIDE 50 MG: 50 TABLET, COATED ORAL at 11:55

## 2025-04-22 RX ADMIN — MIDAZOLAM 2 MG: 1 INJECTION INTRAMUSCULAR; INTRAVENOUS at 07:34

## 2025-04-22 RX ADMIN — SODIUM CHLORIDE, SODIUM LACTATE, POTASSIUM CHLORIDE, AND CALCIUM CHLORIDE: .6; .31; .03; .02 INJECTION, SOLUTION INTRAVENOUS at 09:30

## 2025-04-22 RX ADMIN — SODIUM CHLORIDE, SODIUM LACTATE, POTASSIUM CHLORIDE, AND CALCIUM CHLORIDE 125 ML/HR: .6; .31; .03; .02 INJECTION, SOLUTION INTRAVENOUS at 05:51

## 2025-04-22 RX ADMIN — LIDOCAINE HYDROCHLORIDE 100 MG: 20 INJECTION, SOLUTION EPIDURAL; INFILTRATION; INTRACAUDAL at 07:40

## 2025-04-22 RX ADMIN — DEXAMETHASONE SODIUM PHOSPHATE 10 MG: 10 INJECTION, SOLUTION INTRAMUSCULAR; INTRAVENOUS at 08:13

## 2025-04-22 NOTE — ANESTHESIA POSTPROCEDURE EVALUATION
Post-Op Assessment Note    CV Status:  Stable  Pain Score: 6    Pain management: adequate    Multimodal analgesia used between 6 hours prior to anesthesia start to PACU discharge    Mental Status:  Alert and awake   Hydration Status:  Euvolemic   PONV Controlled:  Controlled   Airway Patency:  Patent  Two or more mitigation strategies used for obstructive sleep apnea   Post Op Vitals Reviewed: Yes    No anethesia notable event occurred.    Staff: Anesthesiologist           Last Filed PACU Vitals:  Vitals Value Taken Time   Temp 97.5 °F (36.4 °C) 04/22/25 1034   Pulse 76 04/22/25 1040   /67 04/22/25 1034   Resp 21 04/22/25 1040   SpO2 98 % 04/22/25 1040   Vitals shown include unfiled device data.    Modified Bee:     Vitals Value Taken Time   Activity 2 04/22/25 1034   Respiration 2 04/22/25 1034   Circulation 2 04/22/25 1034   Consciousness 2 04/22/25 1034   Oxygen Saturation 2 04/22/25 1034     Modified Bee Score: 10

## 2025-04-22 NOTE — DISCHARGE INSTR - AVS FIRST PAGE
POST-OPERATIVE CARE INSTRUCTIONS       Care after your procedure:   General  Rest and relax for 24 hours, then gradually return to normal activities.  Do not perform any heavy lifting or strenuous physical activities for 14 days.  Your activity restrictions will be re-evaluated at your post op visit.  Drink clear liquids until you are certain there is no nausea, then resume a normal diet.  Do not drink alcohol, drive any vehicle, operate mechanical equipment or make critical decisions for at least 24 hours and until you are off any narcotic pain medications.  The Incision  Your incision is closed with:   dissolvable stiches just underneath the skin.                The incision is also covered with:                          clear waterproof glue  A gauze-pad is covering the wound.  Wound care  Remove your gauze-pad after 24 hours.   You may then shower using soap and water to clean your incision. Gently dry the wound.  You may redress your wound with additional gauze and tape if you choose.  A little bruising at the wound site is normal.    Medication  Resume all previous medications  Pain Medication Instructions: over the counter tylenol or prescription tramadol if needed           Other (If applicable)  Wear a post-surgical bra around the clock.  May use ice to the incision site(s) for the next 24-48 hours, twice daily.   Call your  doctor if you have any of the following:  Redness, swelling, heat, drainage, and/or bleeding from your wound  Chills or fever ( above 101' F )  Pain, not relieved with the above medications  If you have any questions or problems call our office [phone]    Follow-up appointment:  As scheduled

## 2025-04-22 NOTE — OR NURSING
Reading glasses in personal case provided by patient with patient label . Will be sent from OR 5 to PACU with patient.

## 2025-04-22 NOTE — ANESTHESIA POSTPROCEDURE EVALUATION
Post-Op Assessment Note    CV Status:  Stable    Pain management: adequate       Mental Status:  Alert and awake   Hydration Status:  Euvolemic   PONV Controlled:  Controlled   Airway Patency:  Patent     Post Op Vitals Reviewed: Yes    No anethesia notable event occurred.    Staff: CRNA           Last Filed PACU Vitals:  Vitals Value Taken Time   Temp     Pulse 70 04/22/25 0956   /74 04/22/25 0956   Resp 21 04/22/25 0956   SpO2 99 % 04/22/25 0956   Vitals shown include unfiled device data.

## 2025-04-22 NOTE — ANESTHESIA PREPROCEDURE EVALUATION
Procedure:  RIGHT BREAST BRACKETED ANGEL-LOCALIZED LUMPECTOMY & LEFT BREAST ANGEL LOCALIZED LUMPECTOMY (ALH/ADH AREA) (Bilateral: Breast)    Relevant Problems   CARDIO   (+) Breast pain, left   (+) Hypertension      ENDO   (+) Controlled type 2 diabetes mellitus with other specified complication, without long-term current use of insulin (HCC)      GI/HEPATIC   (+) GERD (gastroesophageal reflux disease)      PULMONARY   (+) Pulmonary emphysema (HCC)      Oncology   (+) Ductal carcinoma in situ (DCIS) of right breast        Physical Exam    Airway    Mallampati score: II         Dental       Cardiovascular  Rhythm: regular, Rate: normal    Pulmonary   Decreased breath sounds    Other Findings  post-pubertal.      Anesthesia Plan  ASA Score- 3     Anesthesia Type- general with ASA Monitors.         Additional Monitors:     Airway Plan:            Plan Factors-Exercise tolerance (METS): >4 METS.    Chart reviewed.   Existing labs reviewed. Patient summary reviewed.          There is medical exclusion for perioperative obstructive sleep apnea risk education.        Induction- intravenous.    Postoperative Plan-     Perioperative Resuscitation Plan - Level 1 - Full Code.       Informed Consent- Anesthetic plan and risks discussed with patient.        NPO Status:  No vitals data found for the desired time range.

## 2025-04-22 NOTE — INTERVAL H&P NOTE
H&P reviewed. After examining the patient I find no changes in the patients condition since the H&P had been written.    Vitals:    04/22/25 0536   BP: 153/62   Pulse: 83   Resp: 18   Temp: (!) 97.2 °F (36.2 °C)   SpO2: 96%

## 2025-04-22 NOTE — OP NOTE
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]  Atypical lobular hyperplasia (ALH) of left breast [N60.92]    Post-Op Diagnosis Codes:     * Ductal carcinoma in situ (DCIS) of right breast [D05.11]     * Atypical lobular hyperplasia (ALH) of left breast [N60.92]    Procedure(s):  Bilateral - RIGHT BREAST BRACKETED GEORGINA-LOCALIZED LUMPECTOMY & LEFT BREAST GEORGINA LOCALIZED LUMPECTOMY (ALH/ADH AREA)  Use of georgina   Specimen radiograph    Specimen(s):  ID Type Source Tests Collected by Time Destination   1 : Left breast lumpectomy, suture marks, short superior, long lateral Tissue Breast, Left TISSUE EXAM Garima Christianson MD 2025 0817    2 : Left breast lumpectomy, new superior margin, suture marks true margin Tissue Breast, Left TISSUE EXAM Garima Christianson MD 2025 0821    3 : Left breast lumpectomy, new lateral margin, suture marks true margin Tissue Breast, Left TISSUE EXAM Garima Christianson MD 2025 0825    4 : Left breast lumpectomy, new anterior margin, suture marks true margin Tissue Breast, Left TISSUE EXAM Garima Christianson MD 2025 0825    5 : Right breast lumpectomy, suture marks, short superior, long lateral Tissue Breast, Right TISSUE EXAM Garima Christianson MD 2025 0900    6 : Right breast lumpectomy, new lateral margin, suture marks true margin Tissue Breast, Right TISSUE EXAM Garima Christianson MD 2025 0910    7 : Right breast lumpectomy, new inferior margin, suture marks true margin Tissue Breast, Right TISSUE EXAM Garima Christianson MD 2025 0913    8 : Right breast lumpectomy, new posterior margin, suture marks true margin Tissue Breast, Right TISSUE EXAM Garima Christianson MD 2025 0913        Estimated Blood Loss:   Minimal    Drains:  * No  LDAs found *    Anesthesia Type:   General    Operative Indications:  Ductal carcinoma in situ (DCIS) of right breast [D05.11]  Atypical lobular hyperplasia (ALH) of left breast [N60.92]      Operative Findings:  Debra reflectors and clips in specimen      Complications:   None    Procedure and Technique:  69-year-old female who presents with left breast atypia and DCIS of the right breast.  She was counseled on a DEBRA localized excision of the left breast along with a Debra localized bracketed lumpectomy of the right breast.  She presented the day of surgery to the operating room.  She had preoperative antibiotics.  She was administered general anesthesia.  She was prepped and draped in the usual standard fashion.  Timeout was performed.  Attention was turned to the left breast 11:00 periareolar.  The Debra reflector was identified using the  probe.  The skin was marked in this location.  Half percent Marcaine plain was injected for local anesthesia.  The prior circumareolar incision was incised.  Electrocautery was used to dissect superior and deep to the area of concern which was elevated into the wound using an Allis clamp.  This was excised and marked with a short stitch superior and a long stitch lateral.  This was imaged in the operating room revealing the Debra reflector and standard clip.  Additional margins were taken in the lateral, superior and anterior aspects in the case for any potential upgrading.  The sutures were placed on the true margins in all left-sided breast specimens were submitted to pathology in formalin.  Her wound was irrigated and hemostasis was achieved.  A 3-0 Vicryl suture was used for deep closure.  The wound was then closed using multiple interrupted 3-0 Monocryl sutures and a running 4-0 Monocryl subcuticular stitch.  Attention was then turned to the right breast upper inner to central.  Both Debra reflectors were identified using the  probe.  The skin was marked in this  location.  Half percent Marcaine plain was injected for local anesthesia.  Electrocautery was used to dissect a margin superior, medial, inferior, lateral and posterior.  The savvy probe was used throughout to help guide dissection.  The specimen was marked with a short stitch superior and a long stitch lateral.  This was imaged in the operating room revealing both Debra reflectors and standard clip.  Additional margins were excised lateral, inferior and posterior.  The posterior extent was taken down to the muscle.  All breast specimens were submitted to pathology in formalin.  Her wound needed and hemostasis was achieved.  Hemoclips were placed in the lumpectomy bed.  The wound was again closed in a similar fashion.  All counts were correct.  The skin was cleaned and dried.  Surgical glue, fluffs and a bra were applied.  The patient was then extubated and taken to recovery in stable condition.  A physician assistant was required during the procedure for retraction, tissue handling, dissection and suturing; no residents were available.    Patient Disposition:  extubated and stable    This procedure was not performed to treat breast cancer through sentinel node biopsy           SIGNATURE: Garima Christianson MD  DATE: April 22, 2025  TIME: 9:38 AM

## 2025-04-24 ENCOUNTER — PATIENT OUTREACH (OUTPATIENT)
Dept: CASE MANAGEMENT | Facility: OTHER | Age: 70
End: 2025-04-24

## 2025-04-24 NOTE — PROGRESS NOTES
[facility] did previously provide patient with [facility] contact information and resources should patient need to outreach to [facility] for support. [facility] will close at this time however is available should patient need additional assistance.

## 2025-04-25 ENCOUNTER — TELEPHONE (OUTPATIENT)
Age: 70
End: 2025-04-25

## 2025-04-25 NOTE — TELEPHONE ENCOUNTER
How does the incision look? WNL, some swelling of left breast    Do you have fever or chills? No    Are you having any pain? Yes. The left breast is very tender to the touch at times and appears a bit bigger/more swollen. The pain is sometimes sharp and is intermittent. She does not feel any sort of collection/lump but feels the breast is a bit more swollen than it had been. Denies any redness/warmth.     Is it controlled with your pain medication? Yes    What medications are you currently taking for pain control? tylenol    Do you have a drain(s)? No    Verify post-op appointment date and time  [x]    Do you have any other questions or concerns? denies

## 2025-04-28 ENCOUNTER — TELEPHONE (OUTPATIENT)
Age: 70
End: 2025-04-28

## 2025-04-28 NOTE — TELEPHONE ENCOUNTER
Patient calling in requesting to know if Dr. Christianson's team would be able to please contact her in the event her bx report comes back before her post-op appointment, she is nervous and if the report is available prior, she would like to be notified. Thank you.

## 2025-04-30 ENCOUNTER — TELEPHONE (OUTPATIENT)
Age: 70
End: 2025-04-30

## 2025-04-30 PROCEDURE — 88307 TISSUE EXAM BY PATHOLOGIST: CPT | Performed by: STUDENT IN AN ORGANIZED HEALTH CARE EDUCATION/TRAINING PROGRAM

## 2025-04-30 PROCEDURE — 88342 IMHCHEM/IMCYTCHM 1ST ANTB: CPT | Performed by: STUDENT IN AN ORGANIZED HEALTH CARE EDUCATION/TRAINING PROGRAM

## 2025-04-30 PROCEDURE — 88341 IMHCHEM/IMCYTCHM EA ADD ANTB: CPT | Performed by: STUDENT IN AN ORGANIZED HEALTH CARE EDUCATION/TRAINING PROGRAM

## 2025-04-30 PROCEDURE — 88360 TUMOR IMMUNOHISTOCHEM/MANUAL: CPT | Performed by: STUDENT IN AN ORGANIZED HEALTH CARE EDUCATION/TRAINING PROGRAM

## 2025-04-30 NOTE — TELEPHONE ENCOUNTER
Call received from patient. Asking for a call back regarding her results and for Dr Christianson to review them. Aware that she may not hear back today from our office.

## 2025-04-30 NOTE — TELEPHONE ENCOUNTER
Returned patient's call to discuss. Discussed with patient incidental finding of ILC that had been completely removed by Dr. Christianson, along with anticipated findings based on previous biopsies. Discussed that further treatment recommendations would be discussed by Dr. Christianson at her upcoming post-op appointment on 05/07/2025.    Offered sooner post-op appointment on 05/01/2025 at [facility], but patient declined at this time.    No further questions or concerns at this time.

## 2025-05-01 ENCOUNTER — TELEPHONE (OUTPATIENT)
Age: 70
End: 2025-05-01

## 2025-05-01 NOTE — TELEPHONE ENCOUNTER
Returned patient's call to discuss pathology report. Discussed that all things listed on pathology report were all things that had been removed during surgery.    Patient thankful for the information. No further questions or concerns at this time.

## 2025-05-01 NOTE — TELEPHONE ENCOUNTER
Patient calling in, stated that she wanted to talk to Wanda, she hung up before she could be transferred, Wanda was busy with patients and will be calling her back later. Tripp #[phone]

## 2025-05-07 ENCOUNTER — DOCUMENTATION (OUTPATIENT)
Dept: HEMATOLOGY ONCOLOGY | Facility: CLINIC | Age: 70
End: 2025-05-07

## 2025-05-07 ENCOUNTER — TELEPHONE (OUTPATIENT)
Age: 70
End: 2025-05-07

## 2025-05-07 ENCOUNTER — OFFICE VISIT (OUTPATIENT)
Dept: SURGICAL ONCOLOGY | Facility: CLINIC | Age: 70
End: 2025-05-07

## 2025-05-07 VITALS
HEART RATE: 87 BPM | HEIGHT: 66 IN | SYSTOLIC BLOOD PRESSURE: 130 MMHG | OXYGEN SATURATION: 98 % | DIASTOLIC BLOOD PRESSURE: 82 MMHG | WEIGHT: 169.6 LBS | TEMPERATURE: 97.6 F | BODY MASS INDEX: 27.26 KG/M2

## 2025-05-07 DIAGNOSIS — D05.11 DUCTAL CARCINOMA IN SITU (DCIS) OF RIGHT BREAST: ICD-10-CM

## 2025-05-07 DIAGNOSIS — C50.911 INVASIVE LOBULAR CARCINOMA OF RIGHT BREAST, STAGE 1 (HCC): Primary | ICD-10-CM

## 2025-05-07 DIAGNOSIS — N60.92 ATYPICAL LOBULAR HYPERPLASIA (ALH) OF LEFT BREAST: ICD-10-CM

## 2025-05-07 PROCEDURE — 99024 POSTOP FOLLOW-UP VISIT: CPT | Performed by: SURGERY

## 2025-05-07 NOTE — PROGRESS NOTES
Referral to med onc and rad onc received from Dr. Christianson.  Chart reviewed by oncology nurse navigator at this time.      Diagnosis: breast cancer  After review of chart, instructions for scheduling added to referral and sent to be scheduled as advised.    Due to diagnosis on patient's referral, no records retrieval needed by Oncology Navigation at this time.

## 2025-05-07 NOTE — TELEPHONE ENCOUNTER
Patient received a referral for Med/Onc, as per review the patient should be seen within 7 days of 05/07/2025.    The patient is not interested in being seen anywhere else besides [place].    Patient was scheduled for 05/27/2025 @3:40PM with Dr. Flores in [place].     Please contact the patient with a sooner appt if available.

## 2025-05-08 ENCOUNTER — CONSULT (OUTPATIENT)
Dept: RADIATION ONCOLOGY | Facility: CLINIC | Age: 70
End: 2025-05-08
Attending: SURGERY
Payer: MEDICARE

## 2025-05-08 ENCOUNTER — TELEPHONE (OUTPATIENT)
Facility: HOSPITAL | Age: 70
End: 2025-05-08

## 2025-05-08 VITALS
SYSTOLIC BLOOD PRESSURE: 121 MMHG | TEMPERATURE: 97.3 F | RESPIRATION RATE: 16 BRPM | BODY MASS INDEX: 27.16 KG/M2 | HEIGHT: 66 IN | WEIGHT: 169 LBS | OXYGEN SATURATION: 98 % | DIASTOLIC BLOOD PRESSURE: 81 MMHG | HEART RATE: 84 BPM

## 2025-05-08 DIAGNOSIS — C50.911 INVASIVE LOBULAR CARCINOMA OF BREAST, STAGE 1, RIGHT (HCC): ICD-10-CM

## 2025-05-08 DIAGNOSIS — D05.11 DUCTAL CARCINOMA IN SITU (DCIS) OF RIGHT BREAST: Primary | ICD-10-CM

## 2025-05-08 PROBLEM — C50.919 INVASIVE LOBULAR CARCINOMA OF BREAST, STAGE 1 (HCC): Status: ACTIVE | Noted: 2025-03-03

## 2025-05-08 PROCEDURE — 99205 OFFICE O/P NEW HI 60 MIN: CPT | Performed by: RADIOLOGY

## 2025-05-08 PROCEDURE — 99211 OFF/OP EST MAY X REQ PHY/QHP: CPT | Performed by: RADIOLOGY

## 2025-05-08 NOTE — PROGRESS NOTES
69 y.o. female is here today s/p bilateral lumpectomy, the left side for atypia in the right side for DCIS. She reports feeling well overall but has concerns about her pathology.      Physical Exam  Constitutional:       General: She is not in acute distress.  Chest:      Comments: Well-healing incisions in the bilateral breast with no signs of infection  Neurological:      Mental Status: She is alert and oriented to person, place, and time.   Psychiatric:         Mood and Affect: Mood normal.             Diagnoses and all orders for this visit:    Invasive lobular carcinoma of right breast, stage 1 (HCC)    Ductal carcinoma in situ (DCIS) of right breast  -     Ambulatory Referral to Hematology / Oncology; Future  -     Ambulatory Referral to Radiation Oncology; Future    Atypical lobular hyperplasia (ALH) of left breast    Other orders  -     Mastectomy Kit - All Garments and Prosthesis        Assessment/Plan: 69-year-old female who is status post bilateral mastectomy.  The left side was for atypia.  The right side is secondary to ductal carcinoma in situ.  The main lumpectomy specimen showed DCIS only with clean margins.  Additional margins were excised intraoperatively based on the appearance of her specimen radiograph.  There was an incidental focus of invasive lobular carcinoma, 4 mm component in the lateral margin which is completely excised.  This is hormone positive and HER2 negative.  I discussed these findings with her.  We discussed the role of lymphatic sampling.  She is clinically node-negative on exam and imaging.  We decided to forego any lymph node sampling giving the need for radiation therapy and the low likelihood of lizandro involvement.  I am referring her to both medical and radiation oncology.  We will plan to see her again in 3 months for a survivorship visit or sooner should the need arise.

## 2025-05-08 NOTE — TELEPHONE ENCOUNTER
Will call again this afternoon to schedule at the mastectomy bettye manriquez- she is at an appointment currently.

## 2025-05-08 NOTE — ASSESSMENT & PLAN NOTE
Orders:  •  Ambulatory Referral to Radiation Oncology  •  Radiation Simulation Treatment  Jessica Cartagena 1955 is a 69 y.o. female with DCIS of right breast diagnosed after an abnormal mammogram with subsequent biopsy followed by lumpectomy with Dr. Christianson.  Her final pathology revealed an incidental focus of invasive lobular carcinoma 4 mm in size which was completely excised.  She has grade 2, ER+,VT-,HER 2- stage IA, pT1a pN0 M0 disease. She is s/p right and left lumpectomies on 4/22/25.  The left side revealed atypical lobular hyperplasia as well as sclerosing adenosis focally involved by lobular carcinoma in situ.  There was also a 7 mm fibroadenoma.  Genetic displayed a variant of APC gene of unknown significance.  She is clinically node negative on examination and imaging.  The role of lymphatic sampling was discussed with her by Dr. Christianson and again today.  Decision has been made to forego any lymph node sampling due to the low likelihood of lymph node involvement.  She is seen for consultation today to discuss postoperative adjuvant radiation therapy.  We discussed the standard of care after lumpectomy would be for her to undergo radiation therapy to in order to reduce the risk of locally recurrent disease.  We discussed a hypo-fractionated course of radiation therapy over 3 weeks/15 fractions as well as partial breast radiation therapy to the lumpectomy cavity with 5 fractions over 2 weeks.  We discussed the rationale for treatment including the acute side effects and the potential chronic complications with her.  She has estrogen receptor positive disease and has consultation appointment with Dr. Flores next week to discuss adjuvant endocrine therapy.  Given her history of fibromyalgia and various aches and pains with requirement to be seeing pain management, she seems to be reluctant to take any adjuvant endocrine therapy but will discuss this further with Dr. Flores.  We also discussed that there is  data to support omitting radiation therapy in women that are over 65 with hormone receptor positive disease as long as they are willing to take adjuvant endocrine therapy.  Since she is likely not going to take this, she will require radiation therapy.  We discussed hypofractionated whole breast radiation therapy versus partial breast radiation therapy.  She is concerned over the potential side effects long-term with whole breast radiation therapy given her history of fibromyalgia.  We decided on a course of partial breast radiation therapy with 5 fractions over 2 weeks.  She has been given tentative simulation appointment on May 29, 2025 in the [facility] which is also where she will be treated.

## 2025-05-08 NOTE — PROGRESS NOTES
Jessica Cartagena 1955 is a 69 y.o. female Referred by Dr. Christianson for DCIS of right breast grade 2, ER+,IA-,HER 2-. She is s/p right and left lumpectomy from 4/22/25. Genetic displayed a variant of APC gene.     Her left lumpectomy displayed ALH, Columnar cell change. Apocrine metaplasia. Fibroadenoma (0.7 cm). Calcifications associated with sclerosing adenosis and columnar cell change.     She has history of atypical ductal hyperplasia and atypical lobular hyperplasia from 2013 and 2012 in both breasts. She saw  at that time. She had a mammogram in 8/2024 along with repeat mammogram in 10/2024. She had MRI of both breasts in 2/2025 due to history of ADH and ALH,followed by biopsies.     She has family history of breast cancer in 2 maternal Aunts, and her mother had pancreatic cancer.     PMHx: MRSA carrier, GERD, and Hypertension.     8/20/24 Mammogram bilateral   FINDINGS:   RIGHT  1) CALCIFICATIONS [A]: In the central anterior right breast approximately 3 cm from the nipple, calcifications are seen, questionably increased from prior.  Additional scattered unchanged calcifications are present in the surrounding tissue.  Postsurgical changes in the outer right breast.     Left  There are no suspicious masses, grouped microcalcifications or areas of unexplained architectural distortion. The skin and nipple areolar complex are unremarkable.  A few scattered benign-appearing calcifications are unchanged.  Biopsy markers and scattered subcentimeter masses are also unchanged.        IMPRESSION:  Additional imaging required.  A breast health care nurse from our facility will be contacting the patient regarding the need for additional imaging.        ASSESSMENT/BI-RADS CATEGORY:  Left: 2 - Benign  Right: 0 - Need Additional Imaging Evaluation  Overall: 0 - Incomplete: Needs Additional Imaging Evaluation     RECOMMENDATION:       - Diagnostic mammogram at the current time for the right breast.       - Routine  screening mammogram in 1 year for the left breast.    10/14/24 Mammogram R   FINDINGS:   RIGHT  1) CALCIFICATIONS [A]: There are round calcifications in a grouped distribution seen in the central region of the right breast at 12 o'clock, 3 cm from the nipple.  These appear similar to recent prior exam given differences in technique with a probably benign appearance.  Continued 6-month follow-up is recommended to confirm stability.         IMPRESSION:   Probably benign appearing calcifications right breast 12 o'clock position.  6-month follow-up is recommended.           ASSESSMENT/BI-RADS CATEGORY:  Right: 3 - Probably Benign  Overall: 3 - Probably Benign     RECOMMENDATION:       - Diagnostic mammogram in 6 months for the right breast.    11/26/24 Billie Leal, Surgical Oncology   She reports bilateral breast pain. MRI for both breasts ordered.       2/2/25 MRI breast   IMPRESSION:   1.  Right breast segmental attribution non-mass enhancement correlates with calcifications on mammography.  There is a possibility of underlying masses particularly at the right breast 10:00 periareolar.  Recommend MRI directed ultrasound for further evaluation.  Patient should be scheduled for same-day 2 site stereotactic biopsy for targeting the calcifications as well as holding 1 ultrasound biopsy slot in case of mixed modality biopsy approach.  2.  9 mm mass at the left retroareolar location.  MRI directed ultrasound is recommended with ultrasound-guided core needle biopsy if there is a sonographic correlate.  If there is no sonographic correlate, MR biopsy could be attempted though this is a difficult area to target.  3.  Left breast 11:00 non-mass enhancement is unlikely to have sonographic correlate.  Pending the above workup, left breast MRI guided biopsy would be recommended.     ASSESSMENT/BI-RADS CATEGORY:  Left: 4 - Suspicious  Right: 4 - Suspicious  Overall: 4 - Suspicious     RECOMMENDATION:       - Stereotactic breast  biopsy for the right breast.       - Ultrasound-guided breast biopsy for both breasts.       - Ultrasound at the current time for both breasts.    2/5/25 US breasts bilateral   IMPRESSION:   Suspicious masses in the right breast at the 9:00 and 10:00 axes.  Recommend ultrasound-guided biopsy.  Suspicious mass in the left breast at the 11:00 axis possibly corresponding to at least a portion of the area of non-mass enhancement seen on prior MRI.  Recommend MRI guided biopsy.  No suspicious focal findings in the left breast at the 9:00 axis to correspond with bilobed mass seen on prior MRI.  Recommend MRI guided biopsy.  Mildly suspicious focally enlarged left axillary lymph node.  Recommend ultrasound-guided biopsy.     Findings and recommendations were discussed directly with the patient at the termination of the examination.  A member of the breast health services nursing team will facilitate a follow-up biopsy appointment for the multiple masses/lymph nodes seen on today's study.  Of note, the patient was also previously recommended for a 2 site stereotactic biopsy which has been scheduled for February 19, 2025.           ASSESSMENT/BI-RADS CATEGORY:  Left: 4 - Suspicious  Right: 4 - Suspicious  Overall: 4 - Suspicious     RECOMMENDATION:       - MRI-guided breast biopsy for the left breast.       - Ultrasound-guided breast biopsy for both breasts.    2/14/25 Tissue Exam    Breast, Left (11:00, Periareolar), Biopsy:  - Breast tissue with dense stromal fibrosis and focal atypical lobular hyperplasia (ALH). See Note.     B. Axillary Lymph Node, Left Axilla, Biopsy:  - Benign fibroadipose tissue with minute focus of lymphocytes; definitive lymph node tissue not identified.  - Multiple additional levels were evaluated.     C. Breast, Right (9:00, Retroareolar), Biopsy:  - Breast tissue with fibrocystic changes. See Note.     D. Breast, Right (10:00, Periareolar), Biopsy:  - Breast tissue with fibrocystic changes and  microcalcifications. See Note.    2/19/25 Tissue Exam  Breast, Right, Stereo specimen 1 @1:00- 4 cores with calcs:  - Ductal carcinoma in situ (DCIS) and lobular carcinoma in situ (LCIS) adjacent to and involving an sclerosing lesion  - No invasive carcinoma is seen.     B. Breast, Right, Stereo specimen 2 @1:00- 2 cores without calcs:  - Ductal carcinoma in situ and lobular carcinoma in situ adjacent to and involving an sclerosing lesion  - No invasive carcinoma is seen.      C. Breast, Right, Stereo specimen 3 @12:00- 2 cores with calcs:  - Fribroadenoma and sclerosing changes.  - Microcalcifications are present in fibroadenoma.       D. Breast, Right, Stereo specimen 4 @12:00- 4 cores without calcs:  - Benign breast parenchyma, without atypia, including apocrine metaplasia, sclerosing changes, duct dilatation.  - Microcalcifications are present in sclerosing changes  - No evidence of malignancy.     Comment: Immunohistochemistry for calponin (A1, A2, B1, C1, D1), p63(A2), SMMHC (A1, B1) confirm the presence of myoepithelial cells; E cadherin (A2) and p120 (A2) confirms both DCIS and LCIS; CK5/6 (A2) shows loss of stain in DCIS and LCIS component.     Estrogen, progesterone studies pending, to be described in a separate receptor report.     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      3/21/25 Tissue Exam    Breast, Left (Retroareolar Mass), Biopsy:  - Benign breast tissue with sclerosing adenosis, fibrocystic changes, and microcalcifications. See Note.    3/26/25    Discussed left sided biopsy was negative. She will precede with right sided lumpectomy. Discussed excising left ADH due the enhancement on MRI.      4/22/25 Tissue Exam   INVASIVE CARCINOMA OF THE BREAST: Resection   8th Edition - Protocol posted: 6/19/2024INVASIVE CARCINOMA OF THE BREAST: RESECTION - E, F, G, H  SPECIMEN   Procedure  Excision (less than total mastectomy)   Specimen Laterality  Right    TUMOR   Histologic Type  Invasive lobular carcinoma   Histologic Grade ([place] Histologic Score)     Glandular (Acinar) / Tubular Differentiation  Score 3   Nuclear Pleomorphism  Score 2   Mitotic Rate  Score 1   Overall Grade  Grade 2 (scores of 6 or 7)   Tumor Size  Greatest dimension of largest invasive focus (Millimeters): 4 mm   Ductal Carcinoma In Situ (DCIS)  Present     Negative for extensive intraductal component (EIC)   Architectural Patterns  Cribriform     Solid   Nuclear Grade  Grade II (intermediate)   Necrosis  Present, focal (small foci or single cell necrosis)   Lobular Carcinoma In Situ (LCIS)  Present   Lymphatic and / or Vascular Invasion  Not identified   Dermal Lymphatic and / or Vascular Invasion  Not identified   Microcalcifications  Present in DCIS     Present in non-neoplastic tissue   Treatment Effect in the Breast  No known presurgical therapy   MARGINS   Margin Status for Invasive Carcinoma  All margins negative for invasive carcinoma   Distance from Invasive Carcinoma to Closest Margin  1 mm   Closest Margin(s) to Invasive Carcinoma  Lateral   Margin Status for DCIS  All margins negative for DCIS   Distance from DCIS to Closest Margin  Greater than: 2 mm   Closest Margin(s) to DCIS  Anterior     Posterior     Superior     Inferior     Medial     Lateral   REGIONAL LYMPH NODES   Regional Lymph Node Status  Not applicable (no regional lymph nodes submitted or found)   pTNM CLASSIFICATION (AJCC 8th Edition)   Reporting of pT, pN, and (when applicable) pM categories is based on information available to the pathologist at the time the report is issued. As per the AJCC (Chapter 1, 8th Ed.) it is the managing physician's responsibility to establish the final pathologic stage based upon all pertinent information, including but potentially not limited to this pathology report.   pT Category  pT1a   pN Category  pN not assigned (no nodes submitted or found)   .   Breast Biomarker Reporting  Template   Protocol posted: 12/13/2023(Added in Addendum) BREAST BIOMARKER REPORTING TEMPLATE - F  Test(s) Performed     Estrogen Receptor (ER) Status  Positive (greater than 10% of cells demonstrate nuclear positivity)   Percentage of Cells with Nuclear Positivity  81-90%   Average Intensity of Staining  Strong   Test Type  Food and Drug Administration (FDA) cleared (test / vendor): CONFIRM anti-Estrogen Receptor (ER)/[place] Roche   Primary Antibody  SP1   Test(s) Performed     Progesterone Receptor (PgR) Status  Negative (less than 1%)     Internal control cells present and stain as expected   Test Type  Food and Drug Administration (FDA) cleared (test / vendor): CONFIRM anti-Progesterone Receptor (NV)/IT MOVES IT Roche   Primary Antibody  1E2   Test(s) Performed     HER2 by Immunohistochemistry  Negative (Score 0)     Left Lumpectomy  Left breast lumpectomy:  - Atypical lobular hyperplasia.   - Columnar cell change.    - Apocrine metaplasia.   - Sclerosing adenosis involved by atypical lobular hyperplasia.   - Fibroadenoma (0.7 cm).   - Debra , metallic twist clip and metallic butterfly clip are identified.   - Calcifications associated with sclerosing adenosis and columnar cell change.       B. Left breast lumpectomy, new superior margin, excision:  - Benign breast tissue.       C. Left breast lumpectomy, new lateral margin, excision:  - Sclerosing adenosis focally involved by lobular carcinoma in situ, classic type. E-cadherin and P120 confirmatory.   - Apocrine metaplasia.   - Calcifications associated with sclerosing adenosis.        D. Left breast lumpectomy, new anterior margin, excision:  - Benign breast tissue.      Upcoming  5/19/25   8/19/25 Dexa  8/28/25 [place]  8/29/25 Mammogram   11/28/25 [place]             Oncology History   Ductal carcinoma in situ (DCIS) of right breast   2/14/2025 Biopsy    B/L US-guided Bx    A. Left 11:00 periareolar  Breast tissue with dense stromal fibrosis and  focal ALH    B. Left axillary lymph node  Benign fibroadipose tissue with minute focus of lymphocytes; definitive lymph node tissue not identified. Multiple additional levels were evaluated.    C. Right 9:00 retroareolar  Breast tissue with fibrocystic changes.    D. Right 10:00 periareolar  Breast tissue with fibrocystic changes and microcalcifications.     2/19/2025 Biopsy    Right stereo bx, multi site:    A. 1:00, With Calcs  DCIS & LCIS adjacent to and involving a sclerosing lesion  Grade 2  ER 95, SD 80    B. 1:00, W/O Calcs  DCIS & LCIS adjacent to and involving a sclerosing lesion    C. 12:00, With Calcs  Fibroadenoma and sclerosing changes; microcalcifications present in fibroadenoma     D. 12:00 W/O Calcs  Benign breast parenchyma, without atypia, including apocrine metaplasia, sclerosing changes, duct dilatation. No evidence of malignancy.       2/19/2025 -  Cancer Staged    Staging form: Breast, AJCC 8th Edition  - Clinical stage from 2/19/2025: Stage 0 (cTis (DCIS), cN0, cM0, ER+, SD+, HER2: Not Assessed) - Signed by Garima Christianson MD on 3/10/2025  Stage prefix: Initial diagnosis  Method of lymph node assessment: Clinical  Nuclear grade: G2       3/3/2025 Genetic Testing    [place] BRCAplus and Cancer +RNAInsight    VUS of APC     3/21/2025 Biopsy    Left MRI-guided bx    Benign breast tissue with sclerosing adenosis, fibrocystic changes, and microcalcifications.      4/22/2025 Surgery    Right bracketed ANGEL-localized lumpectomy, left breast ANGEL-localized lumpectomy    LEFT: ALH, Columnar cell change. Apocrine metaplasia. Fibroadenoma (0.7 cm). Calcifications associated with sclerosing adenosis and columnar cell change.    RIGHT: DCIS, grade 2  Incidental finding of single foci of ILC (4mm) in new lateral margin, 0.1cm from final margin.  ER 81-90, SD <1, HER2 0  All margins negative for invasive carcinoma and DCIS         Review of Systems:  Review of Systems   Constitutional: Negative.    HENT:  Negative.     Eyes:         Wears glasses   Respiratory: Negative.     Gastrointestinal:  Positive for diarrhea. Negative for abdominal pain, constipation and nausea.   Genitourinary:  Positive for hematuria and vaginal pain (r/t dryness).   Skin:  Positive for wound (healing incisions to b/l breasts).   Allergic/Immunologic: Positive for environmental allergies.   Neurological: Negative.    Hematological: Negative.    Psychiatric/Behavioral:  Negative for sleep disturbance. The patient is nervous/anxious.        Clinical Trial: no    OB/GYN History:  The patient underwent menarche at 12 years  Menopause Status Post  No LMP recorded. Patient has had a hysterectomy.  Menopause at 47} years.  Menopause Reason hysterectomy  Hormone replacement therapy: no   2   Para 2   Age at first delivery being 23 years.   Nursing: yes.   Birth control pills: yes.  Years used unknown    Pregnancy test needed:  no    Prior Radiation no    Teaching NCI pamphlet, SIM, side effects    MST completed    Implantable Devices (Port, Pacemaker, pain stimulator) no    Hip Replacement no      [unfilled]  Health Maintenance   Topic Date Due    Hepatitis C Screening  Never done    Medicare Annual Wellness Visit (AWV)  Never done    Diabetic Foot Exam  Never done    Diabetic Eye Exam  Never done    Depression Screening  Never done    BMI: Followup Plan  Never done    RSV Vaccine for Pregnant Patients and Patients Age 60+ Years (1 - Risk 60-74 years 1-dose series) Never done    Fall Risk  Never done    Urinary Incontinence Screening  Never done    COVID-19 Vaccine (2024- season) 2024    Kidney Health Evaluation: Albumin/Creatinine Ratio  2025    HEMOGLOBIN A1C  2025    Kidney Health Evaluation: GFR  2026    Breast Cancer Screening: Mammogram  2026    BMI: Adult  2026    Colorectal Cancer Screening  2033    Osteoporosis Screening  Completed    Zoster Vaccine  Completed    Pneumococcal  Vaccine: 65+ Years  Completed    Influenza Vaccine  Completed    Meningococcal B Vaccine  Aged Out    RSV Vaccine age 0-20 Months  Aged Out    HIB Vaccine  Aged Out    IPV Vaccine  Aged Out    Hepatitis A Vaccine  Aged Out    Meningococcal ACWY Vaccine  Aged Out    HPV Vaccine  Aged Out     Past Medical History:   Diagnosis Date    Anemia     Anxiety     Breast cancer (HCC) 02/21/25    Right    DJD (degenerative joint disease), cervical     DJD (degenerative joint disease), lumbar     Fibrocystic breast     GERD (gastroesophageal reflux disease)     Hyperlipidemia     Hypertension     MRSA carrier     UTI (urinary tract infection)      Past Surgical History:   Procedure Laterality Date    BREAST BIOPSY Right 03/01/2013    adh    BREAST BIOPSY Left 06/18/2009    benign    BREAST BIOPSY Left 2010    stereo-benign    BREAST BIOPSY Right 2006    benign    BREAST CYST ASPIRATION      BREAST CYST EXCISION      BREAST EXCISIONAL BIOPSY Left 01/06/2011    fernando, alh, radial scar    BREAST EXCISIONAL BIOPSY Right 03/19/2013    adh    COLONOSCOPY      HERNIA REPAIR      umbilical    HIATAL HERNIA REPAIR  2017    LAPAROSCOPIC GASTRIC BANDING  2011    MAMMO NEEDLE LOCALIZATION LEFT (ALL INC) Right 04/11/2025    MAMMO NEEDLE LOCALIZATION LEFT (ALL INC) EACH ADD Right 04/11/2025    MAMMO STEREOTACTIC BREAST BIOPSY RIGHT (ALL INC) Right 02/19/2025    MAMMO STEREOTACTIC BREAST BIOPSY RIGHT (ALL INC) EACH ADD Right 02/19/2025    MRI BREAST BIOPSY LEFT (ALL INCLUSIVE) Left 03/21/2025    PARTIAL GASTRECTOMY  2017    MS EXC BREAST LES PREOP PLMT RAD MARKER OPEN 1 LES Bilateral 4/22/2025    Procedure: RIGHT BREAST BRACKETED ANGEL-LOCALIZED LUMPECTOMY & LEFT BREAST ANGEL LOCALIZED LUMPECTOMY (ALH/ADH AREA);  Surgeon: Garima Christianson MD;  Location: [facility];  Service: Surgical Oncology    MS MASTECTOMY PARTIAL Bilateral 4/22/2025    Procedure: RIGHT BREAST BRACKETED ANGEL-LOCALIZED LUMPECTOMY & LEFT BREAST ANGEL LOCALIZED LUMPECTOMY (ALH/ADH  AREA);  Surgeon: Garima Christianson MD;  Location: [facility];  Service: Surgical Oncology    REMOVAL GASTRIC BAND LAPAROSCOPIC  2016    JOHN-EN-Y PROCEDURE  2021    TONSILLECTOMY      TOTAL ABDOMINAL HYSTERECTOMY N/A 2004    age 49    US BREAST NEEDLE LOC LEFT Left 04/11/2025    US GUIDANCE BREAST BIOPSY LEFT EACH ADDITIONAL Left 02/14/2025    US GUIDANCE BREAST BIOPSY RIGHT EACH ADDITIONAL Right 02/14/2025    US GUIDED BREAST BIOPSY RIGHT COMPLETE Right 02/14/2025    US GUIDED BREAST LYMPH NODE BIOPSY LEFT Left 02/14/2025     Family History   Problem Relation Age of Onset    Fibromyalgia Mother     Pancreatic cancer Mother 68    Heart disease Father     No Known Problems Sister     Breast cancer Maternal Aunt         age unknown    Breast cancer Maternal Aunt         age unknown    No Known Problems Maternal Aunt     No Known Problems Maternal Aunt     No Known Problems Maternal Grandmother     No Known Problems Maternal Grandfather     No Known Problems Paternal Grandmother     No Known Problems Paternal Grandfather     Completed Suicide  Son     Bipolar disorder Son     BRCA2 Positive Neg Hx     BRCA2 Negative Neg Hx     BRCA1 Negative Neg Hx     BRCA 1/2 Neg Hx     Endometrial cancer Neg Hx     Ovarian cancer Neg Hx     BRCA1 Positive Neg Hx     Colon cancer Neg Hx     Breast cancer additional onset Neg Hx      Social History     Tobacco Use    Smoking status: Former    Smokeless tobacco: Never   Vaping Use    Vaping status: Never Used   Substance Use Topics    Alcohol use: Not Currently    Drug use: Yes     Frequency: 7.0 times per week     Types: Marijuana     Comment: medical-tincture.. last used 4/21        Current Outpatient Medications:     acetaminophen (TYLENOL) 500 mg tablet, Take 500 mg by mouth every 6 (six) hours as needed for mild pain, Disp: , Rfl:     Calcium 500-125 MG-UNIT TABS, 600 mg in the morning, Disp: , Rfl:     chlorthalidone 25 mg tablet, Take 12.5 mg by mouth daily, Disp: , Rfl:      Cholecalciferol (VITAMIN D3) 1000 units CAPS, Take by mouth, Disp: , Rfl:     clonazePAM (KlonoPIN) 0.5 mg tablet, Take 0.25 mg by mouth 1/2 tablet as needed, Disp: , Rfl:     Cobalamin Combinations (Vitamin B12-Folic Acid) 500-400 MCG TABS, Take by mouth in the morning, Disp: , Rfl:     [employer] VITAMIN B-12 1000 MCG tablet, Take 1,200 mcg by mouth daily, Disp: , Rfl: 3    famotidine (Pepcid) 20 mg tablet, 2 (two) times a day as needed, Disp: , Rfl:     Fish Oil-Cholecalciferol (OMEGA-3 + VITAMIN D3 PO), Take 2,400 mcg by mouth 3 (three) times a day, Disp: , Rfl:     fluticasone (FLONASE) 50 mcg/act nasal spray, 2 SPRAYS INTO EACH NOSTRIL DAILY., Disp: , Rfl:     Glycerin-Polysorbate 80 (REFRESH DRY EYE THERAPY OP), Apply to eye in the morning, Disp: , Rfl:     IPRATROPIUM BROMIDE NA, into each nostril in the morning, Disp: , Rfl:     Iron Glycinate 29 MG CAPS, Take 29 mg by mouth in the morning One or two tabs per day, Disp: , Rfl:     methocarbamol (ROBAXIN) 750 mg tablet, Take 750 mg by mouth every 8 (eight) hours, Disp: , Rfl:     mometasone (ELOCON) 0.1 % ointment, Apply topically daily as needed, Disp: , Rfl:     multivitamin (THERAGRAN) TABS, Take 1 tablet by mouth daily, Disp: , Rfl:     ONETOUCH DELICA LANCETS 33G MISC, TEST 3 X DAILY, Disp: , Rfl: 3    OneTouch Ultra test strip, TEST BLOOD GLUCOSE DAILY, Disp: , Rfl:     PROAIR  (90 Base) MCG/ACT inhaler, 1 puff every 6 (six) hours as needed, Disp: , Rfl: 2    REPLENS VAGINAL MOISTURIZER VA, Insert into the vagina, Disp: , Rfl:     Sodium Hyaluronate, oral, (HYALURONIC ACID PO), Take 100 mg by mouth in the morning, Disp: , Rfl:     traMADol (Ultram) 50 mg tablet, Take 1 tablet (50 mg total) by mouth every 8 (eight) hours as needed for severe pain, Disp: 9 tablet, Rfl: 0    Vitamin D-Vitamin K (VITAMIN K2-VITAMIN D3 PO), Take 2,500 Int'l Units/day by mouth in the morning, Disp: , Rfl:     vitamin E, tocopherol, 400 units capsule, Take 400 Units by mouth  "daily, Disp: , Rfl:   Allergies   Allergen Reactions    Pantoprazole Anaphylaxis and Other (See Comments)     Throat closing; trouble swallowing    Sucralfate Anaphylaxis and Other (See Comments)     Throat closing; trouble swallowing    Ibuprofen GI Bleeding    Penicillins Hives    Tropicamide Other (See Comments)     Blurry vision    Bupropion Rash    Medical Tape Hives, Itching and Rash      Vitals:    05/08/25 1028   TempSrc: Temporal   Weight: 76.7 kg (169 lb)   Height: 5' 6\" (1.676 m)        "

## 2025-05-08 NOTE — PROGRESS NOTES
Consultation Visit   Name: Jessica Cartagena      : 1955      MRN: [id number]  Encounter Provider: Juan Ramon Martinez MD  Encounter Date: 2025   Encounter department: [facility] RADIATION ONCOLOGY  :  Assessment & Plan  Ductal carcinoma in situ (DCIS) of right breast    Orders:  •  Ambulatory Referral to Radiation Oncology  •  Radiation Simulation Treatment  Jessica Cartagena 1955 is a 69 y.o. female with DCIS of right breast diagnosed after an abnormal mammogram with subsequent biopsy followed by lumpectomy with Dr. Christianson.  Her final pathology revealed an incidental focus of invasive lobular carcinoma 4 mm in size which was completely excised.  She has grade 2, ER+,NC-,HER 2- stage IA, pT1a pN0 M0 disease. She is s/p right and left lumpectomies on 25.  The left side revealed atypical lobular hyperplasia as well as sclerosing adenosis focally involved by lobular carcinoma in situ.  There was also a 7 mm fibroadenoma.  Genetic displayed a variant of APC gene of unknown significance.  She is clinically node negative on examination and imaging.  The role of lymphatic sampling was discussed with her by Dr. Christianson and again today.  Decision has been made to forego any lymph node sampling due to the low likelihood of lymph node involvement.  She is seen for consultation today to discuss postoperative adjuvant radiation therapy.  We discussed the standard of care after lumpectomy would be for her to undergo radiation therapy to in order to reduce the risk of locally recurrent disease.  We discussed a hypo-fractionated course of radiation therapy over 3 weeks/15 fractions as well as partial breast radiation therapy to the lumpectomy cavity with 5 fractions over 2 weeks.  We discussed the rationale for treatment including the acute side effects and the potential chronic complications with her.  She has estrogen receptor positive disease and has consultation appointment with   Mark next week to discuss adjuvant endocrine therapy.  Given her history of fibromyalgia and various aches and pains with requirement to be seeing pain management, she seems to be reluctant to take any adjuvant endocrine therapy but will discuss this further with Dr. Flores.  We also discussed that there is data to support omitting radiation therapy in women that are over 65 with hormone receptor positive disease as long as they are willing to take adjuvant endocrine therapy.  Since she is likely not going to take this, she will require radiation therapy.  We discussed hypofractionated whole breast radiation therapy versus partial breast radiation therapy.  She is concerned over the potential side effects long-term with whole breast radiation therapy given her history of fibromyalgia.  We decided on a course of partial breast radiation therapy with 5 fractions over 2 weeks.  She has been given tentative simulation appointment on May 29, 2025 [facility] which [facility] also where she will be treated.  Invasive lobular carcinoma of breast, stage 1, right (HCC)       See Above      History of Present Illness   Chief Complaint   Patient presents with   • Breast Cancer     Radiation oncology     Pertinent Medical History   Jessica Cartagena 1955 is a 69 y.o. female Referred by Dr. Christianson for DCIS of right breast grade 2, ER+,MI-,HER 2-. She is s/p right and left lumpectomy from 4/22/25. Genetic displayed a variant of APC gene.      Her left lumpectomy displayed ALH, Columnar cell change. Apocrine metaplasia. Fibroadenoma (0.7 cm). Calcifications associated with sclerosing adenosis and columnar cell change.      She has history of atypical ductal hyperplasia and atypical lobular hyperplasia from 2013 and 2012 in both breasts. She saw  at that time. She had a mammogram in 8/2024 along with repeat mammogram in 10/2024. She had MRI of both breasts in 2/2025 due to history of ADH and  KATHIE,followed by biopsies.      She has family history of breast cancer in 2 maternal Aunts, and her mother had pancreatic cancer.      PMHx: MRSA carrier, GERD, COPD and Hypertension.  She also reports a history of fibromyalgia for approximately 7 years with chronic neck pain, low back pain, and muscle pains.  She follows with Dr. Radha Beltran from pain management.     8/20/24 Mammogram bilateral   FINDINGS:   RIGHT  1) CALCIFICATIONS [A]: In the central anterior right breast approximately 3 cm from the nipple, calcifications are seen, questionably increased from prior.  Additional scattered unchanged calcifications are present in the surrounding tissue.  Postsurgical changes in the outer right breast.     Left  There are no suspicious masses, grouped microcalcifications or areas of unexplained architectural distortion. The skin and nipple areolar complex are unremarkable.  A few scattered benign-appearing calcifications are unchanged.  Biopsy markers and scattered subcentimeter masses are also unchanged.     IMPRESSION:  Additional imaging required.  A breast health care nurse from our facility will be contacting the patient regarding the need for additional imaging.     ASSESSMENT/BI-RADS CATEGORY:  Left: 2 - Benign  Right: 0 - Need Additional Imaging Evaluation  Overall: 0 - Incomplete: Needs Additional Imaging Evaluation     RECOMMENDATION:       - Diagnostic mammogram at the current time for the right breast.       - Routine screening mammogram in 1 year for the left breast.     10/14/24 Mammogram Right  FINDINGS:   RIGHT  1) CALCIFICATIONS [A]: There are round calcifications in a grouped distribution seen in the central region of the right breast at 12 o'clock, 3 cm from the nipple.  These appear similar to recent prior exam given differences in technique with a probably benign appearance.  Continued 6-month follow-up is recommended to confirm stability.      IMPRESSION:   Probably benign appearing  calcifications right breast 12 o'clock position.  6-month follow-up is recommended.     ASSESSMENT/BI-RADS CATEGORY:  Right: 3 - Probably Benign  Overall: 3 - Probably Benign     RECOMMENDATION:       - Diagnostic mammogram in 6 months for the right breast.     11/26/24 Billie Leal, Surgical Oncology   She reports bilateral breast pain. MRI for both breasts ordered.      2/2/25 MRI breast   IMPRESSION:   1.  Right breast segmental attribution non-mass enhancement correlates with calcifications on mammography.  There is a possibility of underlying masses particularly at the right breast 10:00 periareolar.  Recommend MRI directed ultrasound for further evaluation.  Patient should be scheduled for same-day 2 site stereotactic biopsy for targeting the calcifications as well as holding 1 ultrasound biopsy slot in case of mixed modality biopsy approach.  2.  9 mm mass at the left retroareolar location.  MRI directed ultrasound is recommended with ultrasound-guided core needle biopsy if there is a sonographic correlate.  If there is no sonographic correlate, MR biopsy could be attempted though this is a difficult area to target.  3.  Left breast 11:00 non-mass enhancement is unlikely to have sonographic correlate.  Pending the above workup, left breast MRI guided biopsy would be recommended.     ASSESSMENT/BI-RADS CATEGORY:  Left: 4 - Suspicious  Right: 4 - Suspicious  Overall: 4 - Suspicious    RECOMMENDATION:       - Stereotactic breast biopsy for the right breast.       - Ultrasound-guided breast biopsy for both breasts.       - Ultrasound at the current time for both breasts.     2/5/25 US breasts bilateral   IMPRESSION:   Suspicious masses in the right breast at the 9:00 and 10:00 axes.  Recommend ultrasound-guided biopsy.  Suspicious mass in the left breast at the 11:00 axis possibly corresponding to at least a portion of the area of non-mass enhancement seen on prior MRI.  Recommend MRI guided biopsy.  No  suspicious focal findings in the left breast at the 9:00 axis to correspond with bilobed mass seen on prior MRI.  Recommend MRI guided biopsy.  Mildly suspicious focally enlarged left axillary lymph node.  Recommend ultrasound-guided biopsy.     Findings and recommendations were discussed directly with the patient at the termination of the examination.  A member of the breast health services nursing team will facilitate a follow-up biopsy appointment for the multiple masses/lymph nodes seen on today's study.  Of note, the patient was also previously recommended for a 2 site stereotactic biopsy which has been scheduled for February 19, 2025.     ASSESSMENT/BI-RADS CATEGORY:  Left: 4 - Suspicious  Right: 4 - Suspicious  Overall: 4 - Suspicious     RECOMMENDATION:       - MRI-guided breast biopsy for the left breast.       - Ultrasound-guided breast biopsy for both breasts.     2/14/25 Tissue Exam    Breast, Left (11:00, Periareolar), Biopsy:  - Breast tissue with dense stromal fibrosis and focal atypical lobular hyperplasia (ALH). See Note.     B. Axillary Lymph Node, Left Axilla, Biopsy:  - Benign fibroadipose tissue with minute focus of lymphocytes; definitive lymph node tissue not identified.  - Multiple additional levels were evaluated.     C. Breast, Right (9:00, Retroareolar), Biopsy:  - Breast tissue with fibrocystic changes. See Note.     D. Breast, Right (10:00, Periareolar), Biopsy:  - Breast tissue with fibrocystic changes and microcalcifications. See Note.     2/19/25 Tissue Exam  Breast, Right, Stereo specimen 1 @1:00- 4 cores with calcs:  - Ductal carcinoma in situ (DCIS) and lobular carcinoma in situ (LCIS) adjacent to and involving an sclerosing lesion  - No invasive carcinoma is seen.     B. Breast, Right, Stereo specimen 2 @1:00- 2 cores without calcs:  - Ductal carcinoma in situ and lobular carcinoma in situ adjacent to and involving an sclerosing lesion  - No invasive carcinoma is seen.      C.  Breast, Right, Stereo specimen 3 @12:00- 2 cores with calcs:  - Fribroadenoma and sclerosing changes.  - Microcalcifications are present in fibroadenoma.       D. Breast, Right, Stereo specimen 4 @12:00- 4 cores without calcs:  - Benign breast parenchyma, without atypia, including apocrine metaplasia, sclerosing changes, duct dilatation.  - Microcalcifications are present in sclerosing changes  - No evidence of malignancy.     Comment: Immunohistochemistry for calponin (A1, A2, B1, C1, D1), p63(A2), SMMHC (A1, B1) confirm the presence of myoepithelial cells; E cadherin (A2) and p120 (A2) confirms both DCIS and LCIS; CK5/6 (A2) shows loss of stain in DCIS and LCIS component.     Estrogen, progesterone studies pending, to be described in a separate receptor report.      3/3/25 Genetic testing displayed variants of unknown significance [id number] and [id number].       3/21/25 Tissue Exam    Breast, Left (Retroareolar Mass), Biopsy:  - Benign breast tissue with sclerosing adenosis, fibrocystic changes, and microcalcifications. See Note.     3/26/25    Discussed left sided biopsy was negative. She will precede with right sided lumpectomy. Discussed excising left ADH due the enhancement on MRI.       4/22/25 Tissue Exam   INVASIVE CARCINOMA OF THE BREAST: Resection   8th Edition - Protocol posted: 6/19/2024INVASIVE CARCINOMA OF THE BREAST: RESECTION - E, F, G, H       SPECIMEN   Procedure   Excision (less than total mastectomy)   Specimen Laterality   Right   TUMOR   Histologic Type   Invasive lobular carcinoma   Histologic Grade (David Histologic Score)       Glandular (Acinar) / Tubular Differentiation   Score 3   Nuclear Pleomorphism   Score 2   Mitotic Rate   Score 1   Overall Grade   Grade 2 (scores of 6 or 7)   Tumor Size   Greatest dimension of largest invasive focus (Millimeters): 4 mm   Ductal Carcinoma In Situ (DCIS)   Present       Negative for extensive intraductal component (EIC)   Architectural Patterns    Cribriform       Solid   Nuclear Grade   Grade II (intermediate)   Necrosis   Present, focal (small foci or single cell necrosis)   Lobular Carcinoma In Situ (LCIS)   Present   Lymphatic and / or Vascular Invasion   Not identified   Dermal Lymphatic and / or Vascular Invasion   Not identified   Microcalcifications   Present in DCIS       Present in non-neoplastic tissue   Treatment Effect in the Breast   No known presurgical therapy   MARGINS   Margin Status for Invasive Carcinoma   All margins negative for invasive carcinoma   Distance from Invasive Carcinoma to Closest Margin   1 mm   Closest Margin(s) to Invasive Carcinoma   Lateral   Margin Status for DCIS   All margins negative for DCIS   Distance from DCIS to Closest Margin   Greater than: 2 mm   Closest Margin(s) to DCIS   Anterior       Posterior       Superior       Inferior       Medial       Lateral   REGIONAL LYMPH NODES   Regional Lymph Node Status   Not applicable (no regional lymph nodes submitted or found)   pTNM CLASSIFICATION (AJCC 8th Edition)   Reporting of pT, pN, and (when applicable) pM categories is based on information available to the pathologist at the time the report is issued. As per the AJCC (Chapter 1, 8th Ed.) it is the managing physician's responsibility to establish the final pathologic stage based upon all pertinent information, including but potentially not limited to this pathology report.   pT Category   pT1a   pN Category   pN not assigned (no nodes submitted or found)   .   Breast Biomarker Reporting Template   Protocol posted: 12/13/2023(Added in Addendum) BREAST BIOMARKER REPORTING TEMPLATE - F  Test(s) Performed       Estrogen Receptor (ER) Status   Positive (greater than 10% of cells demonstrate nuclear positivity)   Percentage of Cells with Nuclear Positivity   81-90%   Average Intensity of Staining   Strong   Test Type   Food and Drug Administration (FDA) cleared (test / vendor): CONFIRM anti-Estrogen Receptor  (ER)/[place] [facility]   Primary Antibody   SP1   Test(s) Performed       Progesterone Receptor (PgR) Status   Negative (less than 1%)       Internal control cells present and stain as expected   Test Type   Food and Drug Administration (FDA) cleared (test / vendor): CONFIRM anti-Progesterone Receptor (MD)/[place] [facility]   Primary Antibody   1E2   Test(s) Performed       HER2 by Immunohistochemistry   Negative (Score 0)      Left Lumpectomy  Left breast lumpectomy:  - Atypical lobular hyperplasia.   - Columnar cell change.    - Apocrine metaplasia.   - Sclerosing adenosis involved by atypical lobular hyperplasia.   - Fibroadenoma (0.7 cm).   - Debra , metallic twist clip and metallic butterfly clip are identified.   - Calcifications associated with sclerosing adenosis and columnar cell change.       B. Left breast lumpectomy, new superior margin, excision:  - Benign breast tissue.       C. Left breast lumpectomy, new lateral margin, excision:  - Sclerosing adenosis focally involved by lobular carcinoma in situ, classic type. E-cadherin and P120 confirmatory.   - Apocrine metaplasia.   - Calcifications associated with sclerosing adenosis.        D. Left breast lumpectomy, new anterior margin, excision:  - Benign breast tissue.       5/7/25 Dr. Christianson  69-year-old female who is status post bilateral mastectomy.  The left side was for atypia.  The right side is secondary to ductal carcinoma in situ.  The main lumpectomy specimen showed DCIS only with clean margins.  Additional margins were excised intraoperatively based on the appearance of her specimen radiograph.  There was an incidental focus of invasive lobular carcinoma, 4 mm component in the lateral margin which is completely excised.  This is hormone positive and HER2 negative.  I discussed these findings with her.  We discussed the role of lymphatic sampling.  She is clinically node-negative on exam and imaging.  We decided to forego any lymph node sampling  giving the need for radiation therapy and the low likelihood of lizandro involvement.  I am referring her to both medical and radiation oncology.  We will plan to see her again in 3 months for a survivorship visit or sooner should the need arise.     Patient seen for consultation today alone.  She was anxious and reassurance was given.  She is recovering well from her bilateral lumpectomies.  She continues to have some discomfort in the bilateral breast about the surgical incisions.  She reports she has had anxiety initially diagnosed in  and then more recently fibromyalgia diagnosed 7 years ago.  She reports some chronic neck pains, lower back pains, and muscle aches and pains.  She is followed by Dr. Radha Beltran from pain management.  She uses medical marijuana and then has injections at various times either in her neck, lower back, or hips.  She previously smoked cigarettes but quit.  She does not drink alcohol.  She was previously on estrogen cream and this was discontinued in 2025 with her diagnosis of hormone receptor positive breast cancer.  She now reports some hot flashes and night sweats since discontinuing the estrogen cream.  She has a history of atypical lobular hyperplasia and ductal hyperplasia in both breasts from  and  when she was previously seeing Dr. Zamora.  She has 1 sister who is healthy. She has no brothers.  Her father  in 2024 at the age of 92 in [facility].  Her mother  of pancreatic cancer at the age of 68.  She was  has 2 sons. She has been  since 1992 or 33 years ago.  She worked for 43 years at [employer] and retired in .  She had 2 sons but 1  of suicide in  and the other son is 43 years old. She occasionally talks to him but does not see much of him because is closer with his father.    Upcoming  25  - consultation  25 Dexa Scan  25 Selkregg  25 Mammogram   25 Billie      Oncology History   Cancer Staging   Ductal carcinoma in situ (DCIS) of right breast  Staging form: Breast, AJCC 8th Edition  - Clinical stage from 2/19/2025: Stage 0 (cTis (DCIS), cN0, cM0, ER+, AZ+, HER2: Not Assessed) - Signed by Garima Christianson MD on 3/10/2025  Stage prefix: Initial diagnosis  Method of lymph node assessment: Clinical  Nuclear grade: G2  Oncology History   Invasive lobular carcinoma of breast, stage 1 (HCC)   2/14/2025 Biopsy    B/L US-guided Bx    A. Left 11:00 periareolar  Breast tissue with dense stromal fibrosis and focal ALH    B. Left axillary lymph node  Benign fibroadipose tissue with minute focus of lymphocytes; definitive lymph node tissue not identified. Multiple additional levels were evaluated.    C. Right 9:00 retroareolar  Breast tissue with fibrocystic changes.    D. Right 10:00 periareolar  Breast tissue with fibrocystic changes and microcalcifications.     2/19/2025 Biopsy    Right stereo bx, multi site:    A. 1:00, With Calcs  DCIS & LCIS adjacent to and involving a sclerosing lesion  Grade 2  ER 95, AZ 80    B. 1:00, W/O Calcs  DCIS & LCIS adjacent to and involving a sclerosing lesion    C. 12:00, With Calcs  Fibroadenoma and sclerosing changes; microcalcifications present in fibroadenoma     D. 12:00 W/O Calcs  Benign breast parenchyma, without atypia, including apocrine metaplasia, sclerosing changes, duct dilatation. No evidence of malignancy.       2/19/2025 -  Cancer Staged    Staging form: Breast, AJCC 8th Edition  - Clinical stage from 2/19/2025: Stage 0 (cTis (DCIS), cN0, cM0, ER+, AZ+, HER2: Not Assessed) - Signed by Garima Christianson MD on 3/10/2025  Stage prefix: Initial diagnosis  Method of lymph node assessment: Clinical  Nuclear grade: G2       3/3/2025 Genetic Testing    [facility] BRCAplus and Cancer +RNA[facility]    VUS of APC     3/21/2025 Biopsy    Left MRI-guided bx    Benign breast tissue with sclerosing adenosis, fibrocystic changes, and microcalcifications.       4/22/2025 Surgery    Right bracketed ANGEL-localized lumpectomy, left breast ANGEL-localized lumpectomy    LEFT: ALH, Columnar cell change. Apocrine metaplasia. Fibroadenoma (0.7 cm). Calcifications associated with sclerosing adenosis and columnar cell change.    RIGHT: DCIS, grade 2  Incidental finding of single foci of ILC (4mm) in new lateral margin, 0.1cm from final margin.  ER 81-90, VT <1, HER2 0  All margins negative for invasive carcinoma and DCIS     4/22/2025 -  Cancer Staged    Staging form: Breast, AJCC 8th Edition  - Pathologic stage from 4/22/2025: Stage IA (pT1a, pN0, cM0, G2, ER+, VT-, HER2-) - Signed by Garima Christianson MD on 5/8/2025  Stage prefix: Initial diagnosis  Method of lymph node assessment: Clinical  Histologic grading system: 3 grade system          Review of Systems Refer to nursing note.    Current Outpatient Medications on File Prior to Visit   Medication Sig Dispense Refill   • acetaminophen (TYLENOL) 500 mg tablet Take 500 mg by mouth every 6 (six) hours as needed for mild pain     • Calcium 500-125 MG-UNIT TABS 600 mg in the morning     • chlorthalidone 25 mg tablet Take 12.5 mg by mouth daily     • Cholecalciferol (VITAMIN D3) 1000 units CAPS Take by mouth     • clonazePAM (KlonoPIN) 0.5 mg tablet Take 0.25 mg by mouth 1/2 tablet as needed     • Cobalamin Combinations (Vitamin B12-Folic Acid) 500-400 MCG TABS Take by mouth in the morning     • [employer]S VITAMIN B-12 1000 MCG tablet Take 1,200 mcg by mouth daily  3   • famotidine (Pepcid) 20 mg tablet 2 (two) times a day as needed     • Fish Oil-Cholecalciferol (OMEGA-3 + VITAMIN D3 PO) Take 2,400 mcg by mouth 3 (three) times a day     • fluticasone (FLONASE) 50 mcg/act nasal spray 2 SPRAYS INTO EACH NOSTRIL DAILY.     • Glycerin-Polysorbate 80 (REFRESH DRY EYE THERAPY OP) Apply to eye in the morning     • IPRATROPIUM BROMIDE NA into each nostril in the morning     • Iron Glycinate 29 MG CAPS Take 29 mg by mouth in the morning One or two  "tabs per day     • methocarbamol (ROBAXIN) 750 mg tablet Take 750 mg by mouth every 8 (eight) hours     • mometasone (ELOCON) 0.1 % ointment Apply topically daily as needed     • multivitamin (THERAGRAN) TABS Take 1 tablet by mouth daily     • ONETOUCH DELICA LANCETS 33G MISC TEST 3 X DAILY  3   • OneTouch Ultra test strip TEST BLOOD GLUCOSE DAILY     • PROAIR  (90 Base) MCG/ACT inhaler 1 puff every 6 (six) hours as needed  2   • REPLENS VAGINAL MOISTURIZER VA Insert into the vagina     • Sodium Hyaluronate, oral, (HYALURONIC ACID PO) Take 100 mg by mouth in the morning     • traMADol (Ultram) 50 mg tablet Take 1 tablet (50 mg total) by mouth every 8 (eight) hours as needed for severe pain 9 tablet 0   • Vitamin D-Vitamin K (VITAMIN K2-VITAMIN D3 PO) Take 2,500 Int'l Units/day by mouth in the morning     • vitamin E, tocopherol, 400 units capsule Take 400 Units by mouth daily       No current facility-administered medications on file prior to visit.      Social History     Tobacco Use   • Smoking status: Former   • Smokeless tobacco: Never   Vaping Use   • Vaping status: Never Used   Substance and Sexual Activity   • Alcohol use: Not Currently   • Drug use: Yes     Frequency: 7.0 times per week     Types: Marijuana     Comment: medical-tincture.. last used 4/21   • Sexual activity: Not on file     Comment: deferred        Objective   /81 (BP Location: Left arm, Patient Position: Sitting, Cuff Size: Standard)   Pulse 84   Temp (!) 97.3 °F (36.3 °C) (Temporal)   Resp 16   Ht 5' 6\" (1.676 m)   Wt 76.7 kg (169 lb)   SpO2 98%   BMI 27.28 kg/m²     Pain Screening:  Pain Score: 0-No pain  ECOG ECOG Performance Status: 1 - Restricted in physically strenuous activity but ambulatory and able to carry out work of a light or sedentary nature, e.g., light house work, office work  Physical Exam  Vitals and nursing note reviewed. Exam conducted with a chaperone present.   Constitutional:       General: She is " not in acute distress.     Appearance: Normal appearance. She is well-developed. She is not diaphoretic.   HENT:      Head: Normocephalic and atraumatic.      Mouth/Throat:      Pharynx: No oropharyngeal exudate.   Eyes:      General: No scleral icterus.     Conjunctiva/sclera: Conjunctivae normal.      Pupils: Pupils are equal, round, and reactive to light.   Neck:      Thyroid: No thyromegaly.      Trachea: No tracheal deviation.   Cardiovascular:      Rate and Rhythm: Normal rate and regular rhythm.      Heart sounds: Normal heart sounds.   Pulmonary:      Effort: Pulmonary effort is normal. No respiratory distress.      Breath sounds: Normal breath sounds. No stridor. No wheezing, rhonchi or rales.   Chest:      Chest wall: No tenderness.   Breasts:     Right: Normal. No swelling, bleeding, inverted nipple, mass, nipple discharge, skin change (Well-healed lumpectomy incision with underlying postsurgical changes and no masses.) or tenderness.      Left: No swelling, bleeding, inverted nipple, mass, nipple discharge, skin change (Well-healed lumpectomy incision with underlying postsurgical changes and no masses.) or tenderness.   Abdominal:      General: Bowel sounds are normal. There is no distension.      Palpations: Abdomen is soft. There is no mass.      Tenderness: There is no abdominal tenderness. There is no rebound.      Hernia: No hernia is present.   Musculoskeletal:         General: No tenderness. Normal range of motion.      Cervical back: Normal range of motion and neck supple.   Lymphadenopathy:      Cervical: No cervical adenopathy.      Upper Body:      Right upper body: No supraclavicular or axillary adenopathy.      Left upper body: No supraclavicular or axillary adenopathy.   Skin:     General: Skin is warm and dry.      Coloration: Skin is not jaundiced or pale.      Findings: No erythema or rash.   Neurological:      General: No focal deficit present.      Mental Status: She is alert and  oriented to person, place, and time.      Cranial Nerves: No cranial nerve deficit.      Sensory: No sensory deficit.      Motor: No weakness.      Coordination: Coordination normal.   Psychiatric:         Mood and Affect: Mood normal.         Behavior: Behavior normal.         Thought Content: Thought content normal.         Judgment: Judgment normal.        Physical Exam  See Above    Results  See Above  Radiology Results: I have reviewed radiology images/reports described above.       Administrative Statements   I have spent a total time of 55 minutes in caring for this patient on the day of the visit/encounter including Diagnostic results, Prognosis, Risks and benefits of tx options, Instructions for management, Patient and family education, Importance of tx compliance, Risk factor reductions, Impressions, Counseling / Coordination of care, Documenting in the medical record, Reviewing/placing orders in the medical record (including tests, medications, and/or procedures), Obtaining or reviewing history  , and Communicating with other healthcare professionals .

## 2025-05-09 LAB
DME PARACHUTE DELIVERY DATE REQUESTED: NORMAL
DME PARACHUTE ITEM DESCRIPTION: NORMAL
DME PARACHUTE ORDER STATUS: NORMAL
DME PARACHUTE SUPPLIER NAME: NORMAL
DME PARACHUTE SUPPLIER PHONE: NORMAL

## 2025-05-09 PROCEDURE — 77263 THER RADIOLOGY TX PLNG CPLX: CPT | Performed by: RADIOLOGY

## 2025-05-12 ENCOUNTER — TELEPHONE (OUTPATIENT)
Facility: HOSPITAL | Age: 70
End: 2025-05-12

## 2025-05-12 ENCOUNTER — TELEPHONE (OUTPATIENT)
Dept: RADIATION ONCOLOGY | Facility: CLINIC | Age: 70
End: 2025-05-12

## 2025-05-12 NOTE — TELEPHONE ENCOUNTER
Returned phone call to Jessica, about follow up from radiation consult. She reported she knows when CT Simulation is scheduled. She had no further questions on concerns.

## 2025-05-12 NOTE — TELEPHONE ENCOUNTER
Scheduled at the [facility] on 5/20/2025 at 11:45AM at our [facility]

## 2025-05-14 ENCOUNTER — TELEPHONE (OUTPATIENT)
Dept: SURGICAL ONCOLOGY | Facility: CLINIC | Age: 70
End: 2025-05-14

## 2025-05-14 NOTE — TELEPHONE ENCOUNTER
Returned patient's call to discuss previous Yuepu Sifanghart message regarding Margins. Discussed with patient that all margins were negative, and there are no concerns regarding her surgical margins. Discussed that there were no issues regarding her ANGEL placments, as Dr. Christianson was able to remove all necessary tissue with clean margins.    Patient also discussed meeting with Dr. Mtz, and feeling overwhelmed overall about follow-up care. Patient is also hesitant to meet with medical oncology to discuss hormone therapy, but encouraged patient to meet with them as scheduled so she can get as much information as possible regarding hormone therapy.    No further questions or concerns at this time. Confirmed appointment with Torsten in August for Survivorship.

## 2025-05-18 NOTE — PROGRESS NOTES
Name: Jessica Cartagena      : 1955      MRN: [id number]  Encounter Provider: Lisa Flores DO  Encounter Date: 2025   Encounter department: [facility] [place]  :  Assessment & Plan  Ductal carcinoma in situ (DCIS) of right breast    1.  Stage IA (pT1a pNX) right breast invasive lobular carcinoma.  Grade 2.  4 mm.  ER positive (81-90%), KS negative, HER2 negative (score 0).    Jessica Cartagena is a 69-year-old postmenopausal female with right breast DCIS s/p lumpectomy. Her final pathology revealed an incidental focus of invasive lobular carcinoma 4 mm in size which was completely excised.  She has grade 2, ER+,KS-,HER 2- stage IA, pT1a cN0 M0 disease. She was noted to be clinically node-negative on exam and imaging and surgery decided to forego any lymph node sampling.  She has been evaluated by radiation oncology and recommended adjuvant radiation.    Lengthy discussion with the patient regarding her diagnosis, treatment option and prognosis.  She has early stage ER positive breast carcinoma.  We discussed recommendation for adjuvant endocrine therapy.  Patient has underlying fibromyalgia and experiences various aches and pains and is hesitant to take an aromatase inhibitor.  We also discussed treatment with tamoxifen, but she declined due to potential side effects. We reviewed the side effects of aromatase inhibitors including, but not limited to hot flashes, vaginal dryness, mood swings, weight gain, difficulty sleeping, decreased sexual interest, arthralgias/myalgias and worsening of osteopenia/osteoporosis.  She was provided information for Arimidex.  At this time, patient is unsure if she will proceed with endocrine therapy.  She would like some time to review her options and will notify the office of her decision.  She had an opportunity to ask questions to her satisfaction.  Follow-up pending patient decision.           Return for Office Visit.    History of  Present Illness   Chief Complaint   Patient presents with    Consult     Oncology History   Cancer Staging   Invasive lobular carcinoma of breast, stage 1 (HCC)  Staging form: Breast, AJCC 8th Edition  - Clinical stage from 2/19/2025: Stage 0 (cTis (DCIS), cN0, cM0, ER+, MT+, HER2: Not Assessed) - Signed by Garima Christianson MD on 3/10/2025  Stage prefix: Initial diagnosis  Method of lymph node assessment: Clinical  Nuclear grade: G2  - Pathologic stage from 4/22/2025: Stage IA (pT1a, pN0, cM0, G2, ER+, MT-, HER2-) - Signed by Garima Christianson MD on 5/8/2025  Stage prefix: Initial diagnosis  Method of lymph node assessment: Clinical  Histologic grading system: 3 grade system  Oncology History   Invasive lobular carcinoma of breast, stage 1 (HCC)   2/14/2025 Biopsy    B/L US-guided Bx    A. Left 11:00 periareolar  Breast tissue with dense stromal fibrosis and focal ALH    B. Left axillary lymph node  Benign fibroadipose tissue with minute focus of lymphocytes; definitive lymph node tissue not identified. Multiple additional levels were evaluated.    C. Right 9:00 retroareolar  Breast tissue with fibrocystic changes.    D. Right 10:00 periareolar  Breast tissue with fibrocystic changes and microcalcifications.     2/19/2025 Biopsy    Right stereo bx, multi site:    A. 1:00, With Calcs  DCIS & LCIS adjacent to and involving a sclerosing lesion  Grade 2  ER 95, MT 80    B. 1:00, W/O Calcs  DCIS & LCIS adjacent to and involving a sclerosing lesion    C. 12:00, With Calcs  Fibroadenoma and sclerosing changes; microcalcifications present in fibroadenoma     D. 12:00 W/O Calcs  Benign breast parenchyma, without atypia, including apocrine metaplasia, sclerosing changes, duct dilatation. No evidence of malignancy.       2/19/2025 -  Cancer Staged    Staging form: Breast, AJCC 8th Edition  - Clinical stage from 2/19/2025: Stage 0 (cTis (DCIS), cN0, cM0, ER+, MT+, HER2: Not Assessed) - Signed by Garima Christianson MD on 3/10/2025  Stage  prefix: Initial diagnosis  Method of lymph node assessment: Clinical  Nuclear grade: G2       3/3/2025 Genetic Testing    [facility] BRCAplus and Cancer +[facility]    VUS of APC     3/21/2025 Biopsy    Left MRI-guided bx    Benign breast tissue with sclerosing adenosis, fibrocystic changes, and microcalcifications.      4/22/2025 Surgery    Right bracketed ANGEL-localized lumpectomy, left breast ANGEL-localized lumpectomy    LEFT: ALH, Columnar cell change. Apocrine metaplasia. Fibroadenoma (0.7 cm). Calcifications associated with sclerosing adenosis and columnar cell change.    RIGHT: DCIS, grade 2  Incidental finding of single foci of ILC (4mm) in new lateral margin, 0.1cm from final margin.  ER 81-90, SC <1, HER2 0  All margins negative for invasive carcinoma and DCIS     4/22/2025 -  Cancer Staged    Staging form: Breast, AJCC 8th Edition  - Pathologic stage from 4/22/2025: Stage IA (pT1a, pN0, cM0, G2, ER+, SC-, HER2-) - Signed by Garima Christianson MD on 5/8/2025  Stage prefix: Initial diagnosis  Method of lymph node assessment: Clinical  Histologic grading system: 3 grade system          Pertinent Medical History      05/19/25:   Jessica Cartagena is a 69-year-old postmenopausal female with PMHx significant for GERD, COPD, HTN, MRSA carrier and fibromyalgia for approximately 7 years with chronic neck pain, lower back pain and muscle pain (follows with pain management).  Has a history of atypical ductal hyperplasia and atypical lobular hyperplasia from 2013 in 2012 in both breasts.  Recently diagnosed with DCIS of right breast, grade 2, ER positive, SC negative s/p right and left lumpectomy.  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  Her final pathology revealed an incidental focus of invasive lobular carcinoma 4 mm in size which was completely excised.  She has grade 2, ER+,SC-,HER 2- stage IA, pT1a cN0 M0 disease. She was noted to be clinically node-negative on exam and imaging and surgery decided to forego any lymph  "node sampling.  She has been evaluated by radiation oncology and recommended adjuvant radiation.  She presents for initial medical oncology consultation.  Denies any breast related complaints.  She does have chronic pain due to fibromyalgia.      Per family she significant for breast cancer in 2 maternal aunts and her mother and pancreatic cancer.     Review of Systems   Constitutional:  Negative for chills and fever.   Respiratory:  Negative for cough and shortness of breath.    Cardiovascular:  Negative for chest pain and palpitations.   Gastrointestinal:  Negative for abdominal pain.   Genitourinary:  Negative for hematuria.   Skin:  Negative for rash.   Hematological:  Does not bruise/bleed easily.   All other systems reviewed and are negative.    Medical History Reviewed by provider this encounter:     .      Objective   /64 (BP Location: Left arm, Patient Position: Sitting, Cuff Size: Adult)   Pulse 92   Temp 97.6 °F (36.4 °C) (Temporal)   Resp 18   Ht 5' 6\" (1.676 m)   Wt 76.2 kg (168 lb)   SpO2 98%   BMI 27.12 kg/m²     Pain Screening:  Pain Score:  (more soreness than pain)  ECOG   0  Physical Exam  Vitals reviewed.   Constitutional:       General: She is not in acute distress.     Appearance: Normal appearance.   HENT:      Head: Normocephalic and atraumatic.      Mouth/Throat:      Mouth: Mucous membranes are moist.     Eyes:      Extraocular Movements: Extraocular movements intact.      Conjunctiva/sclera: Conjunctivae normal.       Cardiovascular:      Rate and Rhythm: Normal rate.   Pulmonary:      Effort: Pulmonary effort is normal. No respiratory distress.      Breath sounds: No wheezing, rhonchi or rales.   Chest:      Comments: S/p bilateral lumpectomy incisions healing well.  Abdominal:      General: Abdomen is flat. There is no distension.      Palpations: Abdomen is soft.     Musculoskeletal:      Cervical back: Normal range of motion and neck supple.      Right lower leg: No edema. "      Left lower leg: No edema.     Skin:     General: Skin is warm.      Coloration: Skin is not pale.     Neurological:      Mental Status: She is alert and oriented to person, place, and time. Mental status is at baseline.      Cranial Nerves: No cranial nerve deficit.     Psychiatric:         Thought Content: Thought content normal.         Labs: I have reviewed the following labs:  Lab Results   Component Value Date/Time    WBC 8.22 04/03/2025 08:02 AM    RBC 4.58 04/03/2025 08:02 AM    Hemoglobin 14.4 04/03/2025 08:02 AM    Hematocrit 45.1 04/03/2025 08:02 AM    MCV 99 (H) 04/03/2025 08:02 AM    MCH 31.4 04/03/2025 08:02 AM    RDW 12.9 04/03/2025 08:02 AM    Platelets 287 04/03/2025 08:02 AM    Segmented % 52 04/03/2025 08:02 AM    Lymphocytes % 39 04/03/2025 08:02 AM    Monocytes % 6 04/03/2025 08:02 AM    Eosinophils Relative 2 04/03/2025 08:02 AM    Basophils Relative 1 04/03/2025 08:02 AM    Immature Grans % 0 04/03/2025 08:02 AM    Absolute Neutrophils 4.31 04/03/2025 08:02 AM     Lab Results   Component Value Date/Time    Potassium 3.9 04/03/2025 08:02 AM    Potassium 4.3 11/20/2024 06:43 AM    Chloride 103 04/03/2025 08:02 AM    Chloride 103 11/20/2024 06:43 AM    Carbon Dioxide 31 11/20/2024 06:43 AM    CO2 28 04/03/2025 08:02 AM    BUN 17 04/03/2025 08:02 AM    BUN 18 11/20/2024 06:43 AM    Creatinine 0.70 04/03/2025 08:02 AM    Creatinine 0.74 11/20/2024 06:43 AM    Glucose, Fasting 117 (H) 04/03/2025 08:02 AM    Calcium 9.2 04/03/2025 08:02 AM    Calcium 9.3 11/20/2024 06:43 AM    AST 20 04/03/2025 08:02 AM    AST 19 11/20/2024 06:43 AM    ALT 24 04/03/2025 08:02 AM    ALT 28 11/20/2024 06:43 AM    Alkaline Phosphatase 77 04/03/2025 08:02 AM    Alkaline Phosphatase 61 11/20/2024 06:43 AM    Total Protein 7.4 04/03/2025 08:02 AM    Protein, Total 7.2 11/20/2024 06:43 AM    Albumin 4.5 04/03/2025 08:02 AM    ALBUMIN 4.4 11/20/2024 06:43 AM    Total Bilirubin 0.50 04/03/2025 08:02 AM    Total Bilirubin  0.6 11/20/2024 06:43 AM    eGFRcr 87 11/20/2024 06:43 AM    eGFR 88 04/03/2025 08:02 AM     2/14/25 Tissue Exam    Breast, Left (11:00, Periareolar), Biopsy:  - Breast tissue with dense stromal fibrosis and focal atypical lobular hyperplasia (ALH). See Note.     B. Axillary Lymph Node, Left Axilla, Biopsy:  - Benign fibroadipose tissue with minute focus of lymphocytes; definitive lymph node tissue not identified.  - Multiple additional levels were evaluated.     C. Breast, Right (9:00, Retroareolar), Biopsy:  - Breast tissue with fibrocystic changes. See Note.     D. Breast, Right (10:00, Periareolar), Biopsy:  - Breast tissue with fibrocystic changes and microcalcifications. See Note.     2/19/25 Tissue Exam  Breast, Right, Stereo specimen 1 @1:00- 4 cores with calcs:  - Ductal carcinoma in situ (DCIS) and lobular carcinoma in situ (LCIS) adjacent to and involving an sclerosing lesion  - No invasive carcinoma is seen.     B. Breast, Right, Stereo specimen 2 @1:00- 2 cores without calcs:  - Ductal carcinoma in situ and lobular carcinoma in situ adjacent to and involving an sclerosing lesion  - No invasive carcinoma is seen.      C. Breast, Right, Stereo specimen 3 @12:00- 2 cores with calcs:  - Fribroadenoma and sclerosing changes.  - Microcalcifications are present in fibroadenoma.       D. Breast, Right, Stereo specimen 4 @12:00- 4 cores without calcs:  - Benign breast parenchyma, without atypia, including apocrine metaplasia, sclerosing changes, duct dilatation.  - Microcalcifications are present in sclerosing changes  - No evidence of malignancy.     Comment: Immunohistochemistry for calponin (A1, A2, B1, C1, D1), p63(A2), SMMHC (A1, B1) confirm the presence of myoepithelial cells; E cadherin (A2) and p120 (A2) confirms both DCIS and LCIS; CK5/6 (A2) shows loss of stain in DCIS and LCIS component.     Estrogen, progesterone studies pending, to be described in a separate receptor report    3/21/25 Tissue Exam     Breast, Left (Retroareolar Mass), Biopsy:  - Benign breast tissue with sclerosing adenosis, fibrocystic changes, and microcalcifications. See Note.    4/22/25 Tissue Exam   INVASIVE CARCINOMA OF THE BREAST: Resection   8th Edition - Protocol posted: 6/19/2024INVASIVE CARCINOMA OF THE BREAST: RESECTION - E, F, G, H          SPECIMEN   Procedure   Excision (less than total mastectomy)   Specimen Laterality   Right   TUMOR   Histologic Type   Invasive lobular carcinoma   Histologic Grade ([place] Histologic Score)       Glandular (Acinar) / Tubular Differentiation   Score 3   Nuclear Pleomorphism   Score 2   Mitotic Rate   Score 1   Overall Grade   Grade 2 (scores of 6 or 7)   Tumor Size   Greatest dimension of largest invasive focus (Millimeters): 4 mm   Ductal Carcinoma In Situ (DCIS)   Present       Negative for extensive intraductal component (EIC)   Architectural Patterns   Cribriform       Solid   Nuclear Grade   Grade II (intermediate)   Necrosis   Present, focal (small foci or single cell necrosis)   Lobular Carcinoma In Situ (LCIS)   Present   Lymphatic and / or Vascular Invasion   Not identified   Dermal Lymphatic and / or Vascular Invasion   Not identified   Microcalcifications   Present in DCIS       Present in non-neoplastic tissue   Treatment Effect in the Breast   No known presurgical therapy   MARGINS   Margin Status for Invasive Carcinoma   All margins negative for invasive carcinoma   Distance from Invasive Carcinoma to Closest Margin   1 mm   Closest Margin(s) to Invasive Carcinoma   Lateral   Margin Status for DCIS   All margins negative for DCIS   Distance from DCIS to Closest Margin   Greater than: 2 mm   Closest Margin(s) to DCIS   Anterior       Posterior       Superior       Inferior       Medial       Lateral   REGIONAL LYMPH NODES   Regional Lymph Node Status   Not applicable (no regional lymph nodes submitted or found)   pTNM CLASSIFICATION (AJCC 8th Edition)   Reporting of pT, pN, and  (when applicable) pM categories is based on information available to the pathologist at the time the report is issued. As per the AJCC (Chapter 1, 8th Ed.) it is the managing physician's responsibility to establish the final pathologic stage based upon all pertinent information, including but potentially not limited to this pathology report.   pT Category   pT1a   pN Category   pN not assigned (no nodes submitted or found)   .   Breast Biomarker Reporting Template   Protocol posted: 12/13/2023(Added in Addendum) BREAST BIOMARKER REPORTING TEMPLATE - F  Test(s) Performed       Estrogen Receptor (ER) Status   Positive (greater than 10% of cells demonstrate nuclear positivity)   Percentage of Cells with Nuclear Positivity   81-90%   Average Intensity of Staining   Strong   Test Type   Food and Drug Administration (FDA) cleared (test / vendor): CONFIRM anti-Estrogen Receptor (ER)/[employer]   Primary Antibody   SP1   Test(s) Performed       Progesterone Receptor (PgR) Status   Negative (less than 1%)       Internal control cells present and stain as expected   Test Type   Food and Drug Administration (FDA) cleared (test / vendor): CONFIRM anti-Progesterone Receptor (MA)/[facility] [employer]   Primary Antibody   1E2   Test(s) Performed       HER2 by Immunohistochemistry   Negative (Score 0)      Left Lumpectomy  Left breast lumpectomy:  - Atypical lobular hyperplasia.   - Columnar cell change.    - Apocrine metaplasia.   - Sclerosing adenosis involved by atypical lobular hyperplasia.   - Fibroadenoma (0.7 cm).   - Debra , metallic twist clip and metallic butterfly clip are identified.   - Calcifications associated with sclerosing adenosis and columnar cell change.       B. Left breast lumpectomy, new superior margin, excision:  - Benign breast tissue.       C. Left breast lumpectomy, new lateral margin, excision:  - Sclerosing adenosis focally involved by lobular carcinoma in situ, classic type. E-cadherin and P120  confirmatory.   - Apocrine metaplasia.   - Calcifications associated with sclerosing adenosis.        D. Left breast lumpectomy, new anterior margin, excision:  - Benign breast tissue.        8/20/24 Mammogram bilateral   FINDINGS:   RIGHT  1) CALCIFICATIONS [A]: In the central anterior right breast approximately 3 cm from the nipple, calcifications are seen, questionably increased from prior.  Additional scattered unchanged calcifications are present in the surrounding tissue.  Postsurgical changes in the outer right breast.     Left  There are no suspicious masses, grouped microcalcifications or areas of unexplained architectural distortion. The skin and nipple areolar complex are unremarkable.  A few scattered benign-appearing calcifications are unchanged.  Biopsy markers and scattered subcentimeter masses are also unchanged.     IMPRESSION:  Additional imaging required.  A breast health care nurse from our facility will be contacting the patient regarding the need for additional imaging.     ASSESSMENT/BI-RADS CATEGORY:  Left: 2 - Benign  Right: 0 - Need Additional Imaging Evaluation  Overall: 0 - Incomplete: Needs Additional Imaging Evaluation     RECOMMENDATION:       - Diagnostic mammogram at the current time for the right breast.       - Routine screening mammogram in 1 year for the left breast.     10/14/24 Mammogram Right  FINDINGS:   RIGHT  1) CALCIFICATIONS [A]: There are round calcifications in a grouped distribution seen in the central region of the right breast at 12 o'clock, 3 cm from the nipple.  These appear similar to recent prior exam given differences in technique with a probably benign appearance.  Continued 6-month follow-up is recommended to confirm stability.      IMPRESSION:   Probably benign appearing calcifications right breast 12 o'clock position.  6-month follow-up is recommended.     ASSESSMENT/BI-RADS CATEGORY:  Right: 3 - Probably Benign  Overall: 3 - Probably Benign      RECOMMENDATION:       - Diagnostic mammogram in 6 months for the right breast.     11/26/24 Billie Leal, Surgical Oncology   She reports bilateral breast pain. MRI for both breasts ordered.      2/2/25 MRI breast   IMPRESSION:   1.  Right breast segmental attribution non-mass enhancement correlates with calcifications on mammography.  There is a possibility of underlying masses particularly at the right breast 10:00 periareolar.  Recommend MRI directed ultrasound for further evaluation.  Patient should be scheduled for same-day 2 site stereotactic biopsy for targeting the calcifications as well as holding 1 ultrasound biopsy slot in case of mixed modality biopsy approach.  2.  9 mm mass at the left retroareolar location.  MRI directed ultrasound is recommended with ultrasound-guided core needle biopsy if there is a sonographic correlate.  If there is no sonographic correlate, MR biopsy could be attempted though this is a difficult area to target.  3.  Left breast 11:00 non-mass enhancement is unlikely to have sonographic correlate.  Pending the above workup, left breast MRI guided biopsy would be recommended.     ASSESSMENT/BI-RADS CATEGORY:  Left: 4 - Suspicious  Right: 4 - Suspicious  Overall: 4 - Suspicious     RECOMMENDATION:       - Stereotactic breast biopsy for the right breast.       - Ultrasound-guided breast biopsy for both breasts.       - Ultrasound at the current time for both breasts.     2/5/25 US breasts bilateral   IMPRESSION:   Suspicious masses in the right breast at the 9:00 and 10:00 axes.  Recommend ultrasound-guided biopsy.  Suspicious mass in the left breast at the 11:00 axis possibly corresponding to at least a portion of the area of non-mass enhancement seen on prior MRI.  Recommend MRI guided biopsy.  No suspicious focal findings in the left breast at the 9:00 axis to correspond with bilobed mass seen on prior MRI.  Recommend MRI guided biopsy.  Mildly suspicious focally enlarged  left axillary lymph node.  Recommend ultrasound-guided biopsy.     Findings and recommendations were discussed directly with the patient at the termination of the examination.  A member of the breast health services nursing team will facilitate a follow-up biopsy appointment for the multiple masses/lymph nodes seen on today's study.  Of note, the patient was also previously recommended for a 2 site stereotactic biopsy which has been scheduled for February 19, 2025.     ASSESSMENT/BI-RADS CATEGORY:  Left: 4 - Suspicious  Right: 4 - Suspicious  Overall: 4 - Suspicious     RECOMMENDATION:       - MRI-guided breast biopsy for the left breast.       - Ultrasound-guided breast biopsy for both breasts.  Administrative Statements   I have spent a total time of 58 minutes in caring for this patient on the day of the visit/encounter including Diagnostic results, Prognosis, Risks and benefits of tx options, and Patient and family education.

## 2025-05-19 ENCOUNTER — CONSULT (OUTPATIENT)
Dept: HEMATOLOGY ONCOLOGY | Facility: CLINIC | Age: 70
End: 2025-05-19
Attending: SURGERY
Payer: MEDICARE

## 2025-05-19 ENCOUNTER — TELEPHONE (OUTPATIENT)
Facility: HOSPITAL | Age: 70
End: 2025-05-19

## 2025-05-19 ENCOUNTER — TELEPHONE (OUTPATIENT)
Age: 70
End: 2025-05-19

## 2025-05-19 VITALS
HEART RATE: 92 BPM | WEIGHT: 168 LBS | RESPIRATION RATE: 18 BRPM | OXYGEN SATURATION: 98 % | HEIGHT: 66 IN | DIASTOLIC BLOOD PRESSURE: 64 MMHG | SYSTOLIC BLOOD PRESSURE: 122 MMHG | TEMPERATURE: 97.6 F | BODY MASS INDEX: 27 KG/M2

## 2025-05-19 DIAGNOSIS — D05.11 DUCTAL CARCINOMA IN SITU (DCIS) OF RIGHT BREAST: Primary | ICD-10-CM

## 2025-05-19 PROCEDURE — 99205 OFFICE O/P NEW HI 60 MIN: CPT | Performed by: INTERNAL MEDICINE

## 2025-05-19 NOTE — TELEPHONE ENCOUNTER
Appointment reminder for mastectomy bra declan and insurance benefits reviewed. Call back number is [phone]

## 2025-05-20 ENCOUNTER — CLINICAL SUPPORT (OUTPATIENT)
Dept: OBGYN CLINIC | Facility: OTHER | Age: 70
End: 2025-05-20

## 2025-05-20 DIAGNOSIS — Z85.3 HISTORY OF BREAST CANCER: Primary | ICD-10-CM

## 2025-05-20 NOTE — PROGRESS NOTES
Mastectomy Bra Fitting Order Details    Jessica Cartagena  1955  [id number]    Reason For Visit  Mastectomy bra     Precautions   History of breast cancer     Subjective  Jessica underwent a b/l lumpectomy in April 2025. States that she will begin radiation on left side.     Surgery Type: Lumpectomy    Lymph node removal no    Date of surgery 4/22/2025    Surgical side bilateral    Objective  Jessica is wearing a molded cup bra. She prefers front closure    Assessment  Band - 20 x 2 = 40     Plan  Ship remainder to home. She was educated on the wear and care of her products    Order Details   b/l lumpectomy 4/22/2025  Inga 48/50 nude  x 1 - SHIP TO HOME  Inga 48/50 black x 1 - RECEIVED ON 5/20/25  Inga 48/50 white x 1 - RECEIVED ON 5/20/2025

## 2025-05-28 ENCOUNTER — TRANSCRIBE ORDERS (OUTPATIENT)
Dept: RADIATION ONCOLOGY | Facility: CLINIC | Age: 70
End: 2025-05-28

## 2025-05-28 DIAGNOSIS — D05.11 DUCTAL CARCINOMA IN SITU (DCIS) OF RIGHT BREAST: Primary | ICD-10-CM

## 2025-05-29 ENCOUNTER — PATIENT OUTREACH (OUTPATIENT)
Dept: CASE MANAGEMENT | Facility: OTHER | Age: 70
End: 2025-05-29

## 2025-05-29 ENCOUNTER — APPOINTMENT (OUTPATIENT)
Dept: RADIATION ONCOLOGY | Facility: CLINIC | Age: 70
End: 2025-05-29
Attending: RADIOLOGY
Payer: MEDICARE

## 2025-05-29 PROCEDURE — 77332 RADIATION TREATMENT AID(S): CPT | Performed by: RADIOLOGY

## 2025-05-29 PROCEDURE — 77290 THER RAD SIMULAJ FIELD CPLX: CPT | Performed by: RADIOLOGY

## 2025-05-29 NOTE — PROGRESS NOTES
OSW outreached to patient to check in with her as she is starting radiation and has SIM today.   OSW followed up on previous resources provided to patient for therapy, support groups, bra clinic, nutrition services. Patient reported that she had participated in 3 months of nutrition classes and utilized the mastectomy boutique. Patient still has the information for the therapists and the support groups. Patient did report that she has a therapist that she has been utilizing for 12 years since her son  but most recently her therapist hasn't been available due to personal issues. Patient is interested in engaging with the [facility] for 6 free counseling sessions focused on Cancer directed therapy and perhaps their virtual Support groups. OSW will outreach to [facility] to request that therapist reach out to patient to establish care per patient request.     Patient unable to participate in [facility] Support Group due to distance. Patient reported that she has been trying to get out of the house more and has more recently pushed herself to do so. OSW praised patient in the effort and strength that it took for her to do so.     Patient was encouraged to reach out to OSW for support if needed as OSW is available to support patient if needed/wanted. Patient does get overwhelmed by frequency of calls so OSW will push out calls.

## 2025-06-09 ENCOUNTER — APPOINTMENT (OUTPATIENT)
Dept: RADIATION ONCOLOGY | Facility: CLINIC | Age: 70
End: 2025-06-09
Attending: RADIOLOGY
Payer: MEDICARE

## 2025-06-09 LAB
RAD ONC ARIA COURSE FIRST TREATMENT DATE: NORMAL
RAD ONC ARIA COURSE ID: NORMAL
RAD ONC ARIA COURSE INTENT: NORMAL
RAD ONC ARIA COURSE LAST TREATMENT DATE: NORMAL
RAD ONC ARIA COURSE SESSION NUMBER: 1
RAD ONC ARIA COURSE START DATE: NORMAL
RAD ONC ARIA COURSE TREATMENT ELAPSED DAYS: 0
RAD ONC ARIA PLAN FRACTIONS TREATED TO DATE: 1
RAD ONC ARIA PLAN ID: NORMAL
RAD ONC ARIA PLAN PRESCRIBED DOSE PER FRACTION: 5.2 GY
RAD ONC ARIA PLAN PRIMARY REFERENCE POINT: NORMAL
RAD ONC ARIA PLAN TOTAL FRACTIONS PRESCRIBED: 5
RAD ONC ARIA PLAN TOTAL PRESCRIBED DOSE: 2600 CGY
RAD ONC ARIA REFERENCE POINT DOSAGE GIVEN TO DATE: 5.2 GY
RAD ONC ARIA REFERENCE POINT ID: NORMAL
RAD ONC ARIA REFERENCE POINT SESSION DOSAGE GIVEN: 5.2 GY

## 2025-06-09 PROCEDURE — 77295 3-D RADIOTHERAPY PLAN: CPT | Performed by: RADIOLOGY

## 2025-06-09 PROCEDURE — 77334 RADIATION TREATMENT AID(S): CPT | Performed by: RADIOLOGY

## 2025-06-09 PROCEDURE — 77427 RADIATION TX MANAGEMENT X5: CPT | Performed by: RADIOLOGY

## 2025-06-09 PROCEDURE — 77280 THER RAD SIMULAJ FIELD SMPL: CPT | Performed by: RADIOLOGY

## 2025-06-09 PROCEDURE — 77300 RADIATION THERAPY DOSE PLAN: CPT | Performed by: RADIOLOGY

## 2025-06-09 PROCEDURE — 77412 RADIATION TX DELIVERY LVL 3: CPT | Performed by: RADIOLOGY

## 2025-06-09 PROCEDURE — 77387 GUIDANCE FOR RADJ TX DLVR: CPT | Performed by: RADIOLOGY

## 2025-06-11 ENCOUNTER — APPOINTMENT (OUTPATIENT)
Dept: RADIATION ONCOLOGY | Facility: CLINIC | Age: 70
End: 2025-06-11
Attending: RADIOLOGY
Payer: MEDICARE

## 2025-06-11 LAB
RAD ONC ARIA COURSE FIRST TREATMENT DATE: NORMAL
RAD ONC ARIA COURSE ID: NORMAL
RAD ONC ARIA COURSE INTENT: NORMAL
RAD ONC ARIA COURSE LAST TREATMENT DATE: NORMAL
RAD ONC ARIA COURSE SESSION NUMBER: 2
RAD ONC ARIA COURSE START DATE: NORMAL
RAD ONC ARIA COURSE TREATMENT ELAPSED DAYS: 2
RAD ONC ARIA PLAN FRACTIONS TREATED TO DATE: 2
RAD ONC ARIA PLAN ID: NORMAL
RAD ONC ARIA PLAN PRESCRIBED DOSE PER FRACTION: 5.2 GY
RAD ONC ARIA PLAN PRIMARY REFERENCE POINT: NORMAL
RAD ONC ARIA PLAN TOTAL FRACTIONS PRESCRIBED: 5
RAD ONC ARIA PLAN TOTAL PRESCRIBED DOSE: 2600 CGY
RAD ONC ARIA REFERENCE POINT DOSAGE GIVEN TO DATE: 10.4 GY
RAD ONC ARIA REFERENCE POINT ID: NORMAL
RAD ONC ARIA REFERENCE POINT SESSION DOSAGE GIVEN: 5.2 GY

## 2025-06-11 PROCEDURE — 77387 GUIDANCE FOR RADJ TX DLVR: CPT | Performed by: RADIOLOGY

## 2025-06-11 PROCEDURE — 77014 CHG CT GUIDANCE RADIATION THERAPY FLDS PLACEMENT: CPT | Performed by: RADIOLOGY

## 2025-06-11 PROCEDURE — 77331 SPECIAL RADIATION DOSIMETRY: CPT | Performed by: RADIOLOGY

## 2025-06-11 PROCEDURE — 77412 RADIATION TX DELIVERY LVL 3: CPT | Performed by: RADIOLOGY

## 2025-06-13 ENCOUNTER — APPOINTMENT (OUTPATIENT)
Dept: RADIATION ONCOLOGY | Facility: CLINIC | Age: 70
End: 2025-06-13
Attending: RADIOLOGY
Payer: MEDICARE

## 2025-06-13 LAB
RAD ONC ARIA COURSE FIRST TREATMENT DATE: NORMAL
RAD ONC ARIA COURSE ID: NORMAL
RAD ONC ARIA COURSE INTENT: NORMAL
RAD ONC ARIA COURSE LAST TREATMENT DATE: NORMAL
RAD ONC ARIA COURSE SESSION NUMBER: 3
RAD ONC ARIA COURSE START DATE: NORMAL
RAD ONC ARIA COURSE TREATMENT ELAPSED DAYS: 4
RAD ONC ARIA PLAN FRACTIONS TREATED TO DATE: 3
RAD ONC ARIA PLAN ID: NORMAL
RAD ONC ARIA PLAN PRESCRIBED DOSE PER FRACTION: 5.2 GY
RAD ONC ARIA PLAN PRIMARY REFERENCE POINT: NORMAL
RAD ONC ARIA PLAN TOTAL FRACTIONS PRESCRIBED: 5
RAD ONC ARIA PLAN TOTAL PRESCRIBED DOSE: 2600 CGY
RAD ONC ARIA REFERENCE POINT DOSAGE GIVEN TO DATE: 15.6 GY
RAD ONC ARIA REFERENCE POINT ID: NORMAL
RAD ONC ARIA REFERENCE POINT SESSION DOSAGE GIVEN: 5.2 GY

## 2025-06-13 PROCEDURE — 77387 GUIDANCE FOR RADJ TX DLVR: CPT | Performed by: RADIOLOGY

## 2025-06-13 PROCEDURE — 77412 RADIATION TX DELIVERY LVL 3: CPT | Performed by: RADIOLOGY

## 2025-06-13 PROCEDURE — 77014 CHG CT GUIDANCE RADIATION THERAPY FLDS PLACEMENT: CPT | Performed by: RADIOLOGY

## 2025-06-16 ENCOUNTER — APPOINTMENT (OUTPATIENT)
Dept: RADIATION ONCOLOGY | Facility: CLINIC | Age: 70
End: 2025-06-16
Attending: RADIOLOGY
Payer: MEDICARE

## 2025-06-16 LAB
RAD ONC ARIA COURSE FIRST TREATMENT DATE: NORMAL
RAD ONC ARIA COURSE ID: NORMAL
RAD ONC ARIA COURSE INTENT: NORMAL
RAD ONC ARIA COURSE LAST TREATMENT DATE: NORMAL
RAD ONC ARIA COURSE SESSION NUMBER: 4
RAD ONC ARIA COURSE START DATE: NORMAL
RAD ONC ARIA COURSE TREATMENT ELAPSED DAYS: 7
RAD ONC ARIA PLAN FRACTIONS TREATED TO DATE: 4
RAD ONC ARIA PLAN ID: NORMAL
RAD ONC ARIA PLAN PRESCRIBED DOSE PER FRACTION: 5.2 GY
RAD ONC ARIA PLAN PRIMARY REFERENCE POINT: NORMAL
RAD ONC ARIA PLAN TOTAL FRACTIONS PRESCRIBED: 5
RAD ONC ARIA PLAN TOTAL PRESCRIBED DOSE: 2600 CGY
RAD ONC ARIA REFERENCE POINT DOSAGE GIVEN TO DATE: 20.8 GY
RAD ONC ARIA REFERENCE POINT ID: NORMAL
RAD ONC ARIA REFERENCE POINT SESSION DOSAGE GIVEN: 5.2 GY

## 2025-06-16 PROCEDURE — 77387 GUIDANCE FOR RADJ TX DLVR: CPT | Performed by: INTERNAL MEDICINE

## 2025-06-16 PROCEDURE — 77014 CHG CT GUIDANCE RADIATION THERAPY FLDS PLACEMENT: CPT | Performed by: INTERNAL MEDICINE

## 2025-06-16 PROCEDURE — 77412 RADIATION TX DELIVERY LVL 3: CPT | Performed by: INTERNAL MEDICINE

## 2025-06-18 ENCOUNTER — APPOINTMENT (OUTPATIENT)
Dept: RADIATION ONCOLOGY | Facility: CLINIC | Age: 70
End: 2025-06-18
Attending: RADIOLOGY
Payer: MEDICARE

## 2025-06-18 LAB
RAD ONC ARIA COURSE FIRST TREATMENT DATE: NORMAL
RAD ONC ARIA COURSE ID: NORMAL
RAD ONC ARIA COURSE INTENT: NORMAL
RAD ONC ARIA COURSE LAST TREATMENT DATE: NORMAL
RAD ONC ARIA COURSE SESSION NUMBER: 5
RAD ONC ARIA COURSE START DATE: NORMAL
RAD ONC ARIA COURSE TREATMENT ELAPSED DAYS: 9
RAD ONC ARIA PLAN FRACTIONS TREATED TO DATE: 5
RAD ONC ARIA PLAN ID: NORMAL
RAD ONC ARIA PLAN PRESCRIBED DOSE PER FRACTION: 5.2 GY
RAD ONC ARIA PLAN PRIMARY REFERENCE POINT: NORMAL
RAD ONC ARIA PLAN TOTAL FRACTIONS PRESCRIBED: 5
RAD ONC ARIA PLAN TOTAL PRESCRIBED DOSE: 2600 CGY
RAD ONC ARIA REFERENCE POINT DOSAGE GIVEN TO DATE: 26 GY
RAD ONC ARIA REFERENCE POINT ID: NORMAL
RAD ONC ARIA REFERENCE POINT SESSION DOSAGE GIVEN: 5.2 GY

## 2025-06-18 PROCEDURE — 77387 GUIDANCE FOR RADJ TX DLVR: CPT | Performed by: RADIOLOGY

## 2025-06-18 PROCEDURE — 77412 RADIATION TX DELIVERY LVL 3: CPT | Performed by: RADIOLOGY

## 2025-06-18 PROCEDURE — 77014 CHG CT GUIDANCE RADIATION THERAPY FLDS PLACEMENT: CPT | Performed by: RADIOLOGY

## 2025-06-18 PROCEDURE — 77336 RADIATION PHYSICS CONSULT: CPT | Performed by: RADIOLOGY

## 2025-06-23 ENCOUNTER — TELEPHONE (OUTPATIENT)
Dept: RADIATION ONCOLOGY | Facility: CLINIC | Age: 70
End: 2025-06-23

## 2025-06-23 ENCOUNTER — TELEPHONE (OUTPATIENT)
Age: 70
End: 2025-06-23

## 2025-06-23 NOTE — TELEPHONE ENCOUNTER
Patient.    Questions about markers on chest and recent rad/onc treatment.    Please call [phone]

## 2025-06-23 NOTE — TELEPHONE ENCOUNTER
"Spoke to Jessica about radiation symptoms. She reported underneath both breasts some redness, and feels hot. Discussed continuing to put remedy and Aquaphor on that area. Denies fever, or open areas, and signs of infection. Discussed we treated her right breast with radiation. She reported in between her breast's in the middle section it is red and blotchy. Discussed using remedy cream and Aquaphor on that area. She denied itching. She reported her \"left breast feel's hot and prickly\". Discussed using the recommended creams on that side as well. Discussed continuing to monitor her symptoms. Discussed proper skin care for being out in the sun. Further discussed following up with family doctor if symptoms worsen. She has office number. She was appreciative of call.   "

## 2025-06-25 ENCOUNTER — PATIENT OUTREACH (OUTPATIENT)
Dept: CASE MANAGEMENT | Facility: OTHER | Age: 70
End: 2025-06-25

## 2025-06-25 NOTE — PROGRESS NOTES
"OSW outreached to patient to check in. Patient reported that her power went out around the neighborhood and it's scheduled to possibly come back on later on today.     OSW will send following message to Provider for clarification per request of patient.   Patient is expressing a lot of anxiety related to whether or not she is \"cancer free\". She is wondering if there is anyway to  tell if the treatment has helped/benefit? She does report that she continues to have rash on left side of chest and it's prickly and red. She still can't wear a regular bra and continues to either wear a sports bra or no bra due to discomfort.      Patient reported that she has swelling/discoloration to one of her ankles/foot. She did contact her PCP and will be seeing her on Friday after her PCP returns from vacation. OSW asked patient if she needs to go to urgent care for follow up, denies. Patient unsure how injury occurred.        Patient also hadn't heard from [facility] regarding counseling. OSW will outreach to them to request that they call to try and link her with counseling services. Email message sent to [facility] requesting assistance with counseling   "

## 2025-07-11 LAB
DME PARACHUTE DELIVERY DATE ACTUAL: NORMAL
DME PARACHUTE DELIVERY DATE REQUESTED: NORMAL
DME PARACHUTE ITEM DESCRIPTION: NORMAL
DME PARACHUTE ORDER STATUS: NORMAL
DME PARACHUTE SUPPLIER NAME: NORMAL
DME PARACHUTE SUPPLIER PHONE: NORMAL

## 2025-07-21 ENCOUNTER — TELEPHONE (OUTPATIENT)
Dept: RADIATION ONCOLOGY | Facility: CLINIC | Age: 70
End: 2025-07-21

## 2025-07-21 NOTE — TELEPHONE ENCOUNTER
Jessica calling in stating she still has not received a call from Dr Martinez for her telephone visit. Patient disconnected call very upset. Please call her at [phone]

## 2025-07-21 NOTE — TELEPHONE ENCOUNTER
Pt called requesting confirmation of phone F/U date and time.  Noted to pt that provider may be running a bit behind, but he will be calling.

## 2025-08-04 ENCOUNTER — TELEPHONE (OUTPATIENT)
Dept: HEMATOLOGY ONCOLOGY | Facility: CLINIC | Age: 70
End: 2025-08-04

## 2025-08-19 ENCOUNTER — HOSPITAL ENCOUNTER (OUTPATIENT)
Dept: BONE DENSITY | Facility: MEDICAL CENTER | Age: 70
Discharge: HOME | End: 2025-08-19
Payer: MEDICARE

## 2025-08-19 VITALS — BODY MASS INDEX: 26.52 KG/M2 | HEIGHT: 66 IN | WEIGHT: 165 LBS

## 2025-08-19 DIAGNOSIS — Z13.820 ENCOUNTER FOR SCREENING FOR OSTEOPOROSIS: ICD-10-CM

## 2025-08-19 DIAGNOSIS — Z78.0 MENOPAUSE: ICD-10-CM

## 2025-08-19 PROCEDURE — 77080 DXA BONE DENSITY AXIAL: CPT

## (undated) DEVICE — REM POLYHESIVE ADULT PATIENT RETURN ELECTRODE: Brand: VALLEYLAB

## (undated) DEVICE — SUT MONOCRYL 4-0 PS-2 27 IN Y426H

## (undated) DEVICE — SHEATH, GUIDE, SAVI SCOUT®: Brand: SAVI SCOUT®

## (undated) DEVICE — SPECIAMEN GRID KLINITRAY SM LT 3 PC KIT

## (undated) DEVICE — TUBING SUCTION 5MM X 12 FT

## (undated) DEVICE — SCD SEQUENTIAL COMPRESSION COMFORT SLEEVE MEDIUM KNEE LENGTH: Brand: KENDALL SCD

## (undated) DEVICE — EXOFIN PRECISION PEN HIGH VISCOSITY TOPICAL SKIN ADHESIVE: Brand: EXOFIN PRECISION PEN, 1G

## (undated) DEVICE — DRAPE SHEET THREE QUARTER

## (undated) DEVICE — BETHLEHEM UNIVERSAL MINOR GEN: Brand: CARDINAL HEALTH

## (undated) DEVICE — SUT MONOCRYL 3-0 SH 27 IN Y416H

## (undated) DEVICE — ANTIBACTERIAL UNDYED BRAIDED (POLYGLACTIN 910), SYNTHETIC ABSORBABLE SUTURE: Brand: COATED VICRYL

## (undated) DEVICE — APPLIER SURGICLIP PREMIUM SMALL 9IN

## (undated) DEVICE — BRA SURGICAL SZ SMALL (30-33)

## (undated) DEVICE — GLOVE SRG BIOGEL 6

## (undated) DEVICE — NEPTUNE E-SEP SMOKE EVACUATION PENCIL, COATED, 70MM BLADE, ROCKER SWITCH: Brand: NEPTUNE E-SEP

## (undated) DEVICE — SUPER SPONGES,MEDIUM: Brand: KERLIX

## (undated) DEVICE — MEDI-VAC YANKAUER SUCTION HANDLE W/BULBOUS AND CONTROL VENT: Brand: CARDINAL HEALTH

## (undated) DEVICE — SUT SILK 2-0 SH 30 IN K833H

## (undated) DEVICE — SUT SILK 3-0 SH 30 IN K832H